# Patient Record
Sex: FEMALE | Race: WHITE | NOT HISPANIC OR LATINO | Employment: OTHER | URBAN - METROPOLITAN AREA
[De-identification: names, ages, dates, MRNs, and addresses within clinical notes are randomized per-mention and may not be internally consistent; named-entity substitution may affect disease eponyms.]

---

## 2019-07-19 ENCOUNTER — HOSPITAL ENCOUNTER (INPATIENT)
Facility: HOSPITAL | Age: 84
LOS: 17 days | Discharge: HOME | DRG: 884 | End: 2019-08-05
Attending: PSYCHIATRY & NEUROLOGY | Admitting: PSYCHIATRY & NEUROLOGY
Payer: MEDICARE

## 2019-07-19 ENCOUNTER — HOSPITAL ENCOUNTER (EMERGENCY)
Facility: HOSPITAL | Age: 84
DRG: 884 | End: 2019-07-19
Attending: EMERGENCY MEDICINE
Payer: MEDICARE

## 2019-07-19 VITALS
SYSTOLIC BLOOD PRESSURE: 127 MMHG | BODY MASS INDEX: 23.56 KG/M2 | HEART RATE: 71 BPM | WEIGHT: 120 LBS | TEMPERATURE: 98.6 F | OXYGEN SATURATION: 100 % | HEIGHT: 60 IN | RESPIRATION RATE: 16 BRPM | DIASTOLIC BLOOD PRESSURE: 60 MMHG

## 2019-07-19 DIAGNOSIS — R44.3 HALLUCINATIONS: Primary | ICD-10-CM

## 2019-07-19 DIAGNOSIS — F06.32 DEPRESSIVE DISORDER DUE TO ANOTHER MEDICAL CONDITION WITH MAJOR DEPRESSIVE-LIKE EPISODE: Primary | ICD-10-CM

## 2019-07-19 DIAGNOSIS — I10 HYPERTENSION: ICD-10-CM

## 2019-07-19 DIAGNOSIS — E78.5 DYSLIPIDEMIA: ICD-10-CM

## 2019-07-19 DIAGNOSIS — E03.9 HYPOTHYROIDISM: ICD-10-CM

## 2019-07-19 DIAGNOSIS — E87.1 HYPONATREMIA: ICD-10-CM

## 2019-07-19 DIAGNOSIS — L30.4 INTERTRIGO: ICD-10-CM

## 2019-07-19 DIAGNOSIS — R42 DIZZINESS: ICD-10-CM

## 2019-07-19 DIAGNOSIS — E55.9 VITAMIN D DEFICIENCY: ICD-10-CM

## 2019-07-19 DIAGNOSIS — Z00.8 MEDICAL CLEARANCE FOR PSYCHIATRIC ADMISSION: Primary | ICD-10-CM

## 2019-07-19 DIAGNOSIS — Z00.8 MEDICAL CLEARANCE FOR PSYCHIATRIC ADMISSION: ICD-10-CM

## 2019-07-19 DIAGNOSIS — K21.9 GERD (GASTROESOPHAGEAL REFLUX DISEASE): ICD-10-CM

## 2019-07-19 DIAGNOSIS — E53.8 VITAMIN B12 DEFICIENCY: ICD-10-CM

## 2019-07-19 DIAGNOSIS — I25.10 CORONARY ARTERY DISEASE: ICD-10-CM

## 2019-07-19 LAB
ALBUMIN SERPL BCP-MCNC: 4 G/DL (ref 3.5–5.7)
ALP SERPL-CCNC: 58 U/L (ref 55–165)
ALT SERPL W P-5'-P-CCNC: 11 U/L (ref 7–52)
AMPHETAMINES SERPL QL SCN: NEGATIVE
ANION GAP SERPL CALCULATED.3IONS-SCNC: 4 MMOL/L (ref 4–13)
AST SERPL W P-5'-P-CCNC: 14 U/L (ref 13–39)
ATRIAL RATE: 62 BPM
BARBITURATES UR QL: NEGATIVE
BASOPHILS # BLD AUTO: 0 THOUSANDS/ΜL (ref 0–0.1)
BASOPHILS NFR BLD AUTO: 1 % (ref 0–2)
BENZODIAZ UR QL: POSITIVE
BILIRUB SERPL-MCNC: 0.4 MG/DL (ref 0.2–1)
BILIRUB UR QL STRIP: NEGATIVE
BUN SERPL-MCNC: 14 MG/DL (ref 7–25)
CALCIUM SERPL-MCNC: 9 MG/DL (ref 8.6–10.5)
CHLORIDE SERPL-SCNC: 94 MMOL/L (ref 98–107)
CLARITY UR: CLEAR
CO2 SERPL-SCNC: 29 MMOL/L (ref 21–31)
COCAINE UR QL: NEGATIVE
COLOR UR: YELLOW
CREAT SERPL-MCNC: 1.02 MG/DL (ref 0.6–1.2)
EOSINOPHIL # BLD AUTO: 0.1 THOUSAND/ΜL (ref 0–0.61)
EOSINOPHIL NFR BLD AUTO: 2 % (ref 0–5)
ERYTHROCYTE [DISTWIDTH] IN BLOOD BY AUTOMATED COUNT: 13.6 % (ref 11.5–14.5)
ETHANOL SERPL-MCNC: <10 MG/DL
GFR SERPL CREATININE-BSD FRML MDRD: 48 ML/MIN/1.73SQ M
GLUCOSE SERPL-MCNC: 102 MG/DL (ref 65–99)
GLUCOSE UR STRIP-MCNC: NEGATIVE MG/DL
HCT VFR BLD AUTO: 34.6 % (ref 42–47)
HGB BLD-MCNC: 11.8 G/DL (ref 12–16)
HGB UR QL STRIP.AUTO: NEGATIVE
KETONES UR STRIP-MCNC: NEGATIVE MG/DL
LEUKOCYTE ESTERASE UR QL STRIP: NEGATIVE
LYMPHOCYTES # BLD AUTO: 0.9 THOUSANDS/ΜL (ref 0.6–4.47)
LYMPHOCYTES NFR BLD AUTO: 15 % (ref 21–51)
MCH RBC QN AUTO: 33.8 PG (ref 26–34)
MCHC RBC AUTO-ENTMCNC: 34.2 G/DL (ref 31–37)
MCV RBC AUTO: 99 FL (ref 81–99)
METHADONE UR QL: NEGATIVE
MONOCYTES # BLD AUTO: 0.5 THOUSAND/ΜL (ref 0.17–1.22)
MONOCYTES NFR BLD AUTO: 8 % (ref 2–12)
NEUTROPHILS # BLD AUTO: 4.4 THOUSANDS/ΜL (ref 1.4–6.5)
NEUTS SEG NFR BLD AUTO: 74 % (ref 42–75)
NITRITE UR QL STRIP: NEGATIVE
OPIATES UR QL SCN: NEGATIVE
P AXIS: 63 DEGREES
PCP UR QL: NEGATIVE
PH UR STRIP.AUTO: 6 [PH]
PLATELET # BLD AUTO: 366 THOUSANDS/UL (ref 149–390)
PMV BLD AUTO: 7.8 FL (ref 8.6–11.7)
POTASSIUM SERPL-SCNC: 4.4 MMOL/L (ref 3.5–5.5)
PR INTERVAL: 172 MS
PROT SERPL-MCNC: 6.9 G/DL (ref 6.4–8.9)
PROT UR STRIP-MCNC: NEGATIVE MG/DL
QRS AXIS: 65 DEGREES
QRSD INTERVAL: 96 MS
QT INTERVAL: 422 MS
QTC INTERVAL: 428 MS
RBC # BLD AUTO: 3.5 MILLION/UL (ref 3.9–5.2)
SODIUM SERPL-SCNC: 127 MMOL/L (ref 134–143)
SP GR UR STRIP.AUTO: 1.01 (ref 1–1.03)
T WAVE AXIS: 62 DEGREES
THC UR QL: NEGATIVE
TSH SERPL DL<=0.05 MIU/L-ACNC: 0.99 UIU/ML (ref 0.45–5.33)
UROBILINOGEN UR QL STRIP.AUTO: 0.2 E.U./DL
VENTRICULAR RATE: 62 BPM
WBC # BLD AUTO: 5.9 THOUSAND/UL (ref 4.8–10.8)

## 2019-07-19 PROCEDURE — 36415 COLL VENOUS BLD VENIPUNCTURE: CPT

## 2019-07-19 PROCEDURE — 93005 ELECTROCARDIOGRAM TRACING: CPT

## 2019-07-19 PROCEDURE — 99285 EMERGENCY DEPT VISIT HI MDM: CPT

## 2019-07-19 PROCEDURE — 80307 DRUG TEST PRSMV CHEM ANLYZR: CPT

## 2019-07-19 PROCEDURE — 80053 COMPREHEN METABOLIC PANEL: CPT

## 2019-07-19 PROCEDURE — 84443 ASSAY THYROID STIM HORMONE: CPT

## 2019-07-19 PROCEDURE — 81003 URINALYSIS AUTO W/O SCOPE: CPT

## 2019-07-19 PROCEDURE — 85025 COMPLETE CBC W/AUTO DIFF WBC: CPT

## 2019-07-19 PROCEDURE — 93010 ELECTROCARDIOGRAM REPORT: CPT | Performed by: INTERNAL MEDICINE

## 2019-07-19 PROCEDURE — 80320 DRUG SCREEN QUANTALCOHOLS: CPT

## 2019-07-19 PROCEDURE — 1124F ACP DISCUSS-NO DSCNMKR DOCD: CPT | Performed by: NURSE PRACTITIONER

## 2019-07-19 RX ORDER — TRAZODONE HYDROCHLORIDE 50 MG/1
50 TABLET ORAL
Status: CANCELLED | OUTPATIENT
Start: 2019-07-19

## 2019-07-19 RX ORDER — ACETAMINOPHEN 325 MG/1
650 TABLET ORAL EVERY 4 HOURS PRN
Status: CANCELLED | OUTPATIENT
Start: 2019-07-19

## 2019-07-19 RX ORDER — ACETAMINOPHEN 325 MG/1
975 TABLET ORAL EVERY 6 HOURS PRN
Status: CANCELLED | OUTPATIENT
Start: 2019-07-19

## 2019-07-19 RX ORDER — ACETAMINOPHEN 325 MG/1
650 TABLET ORAL EVERY 6 HOURS PRN
Status: CANCELLED | OUTPATIENT
Start: 2019-07-19

## 2019-07-19 RX ORDER — LOSARTAN POTASSIUM 25 MG/1
25 TABLET ORAL DAILY
Status: ON HOLD | COMMUNITY
End: 2019-08-03 | Stop reason: SDUPTHER

## 2019-07-19 RX ORDER — HALOPERIDOL 0.5 MG/1
1 TABLET ORAL EVERY 6 HOURS PRN
Status: DISCONTINUED | OUTPATIENT
Start: 2019-07-19 | End: 2019-08-05 | Stop reason: HOSPADM

## 2019-07-19 RX ORDER — OMEPRAZOLE 20 MG/1
20 CAPSULE, DELAYED RELEASE ORAL DAILY
COMMUNITY
End: 2019-08-05 | Stop reason: HOSPADM

## 2019-07-19 RX ORDER — ACETAMINOPHEN 325 MG/1
975 TABLET ORAL EVERY 6 HOURS PRN
Status: DISCONTINUED | OUTPATIENT
Start: 2019-07-19 | End: 2019-08-05 | Stop reason: HOSPADM

## 2019-07-19 RX ORDER — LORAZEPAM 0.5 MG/1
0.5 TABLET ORAL EVERY 6 HOURS PRN
Status: CANCELLED | OUTPATIENT
Start: 2019-07-19

## 2019-07-19 RX ORDER — ZIPRASIDONE MESYLATE 20 MG/ML
10 INJECTION, POWDER, LYOPHILIZED, FOR SOLUTION INTRAMUSCULAR EVERY 4 HOURS PRN
Status: DISCONTINUED | OUTPATIENT
Start: 2019-07-19 | End: 2019-08-05 | Stop reason: HOSPADM

## 2019-07-19 RX ORDER — RISPERIDONE 1 MG/1
1 TABLET, ORALLY DISINTEGRATING ORAL
Status: CANCELLED | OUTPATIENT
Start: 2019-07-19

## 2019-07-19 RX ORDER — LEVOTHYROXINE SODIUM 0.05 MG/1
50 TABLET ORAL
Status: ON HOLD | COMMUNITY
End: 2019-08-03 | Stop reason: SDUPTHER

## 2019-07-19 RX ORDER — MAGNESIUM HYDROXIDE/ALUMINUM HYDROXICE/SIMETHICONE 120; 1200; 1200 MG/30ML; MG/30ML; MG/30ML
30 SUSPENSION ORAL EVERY 4 HOURS PRN
Status: CANCELLED | OUTPATIENT
Start: 2019-07-19

## 2019-07-19 RX ORDER — ACETAMINOPHEN 325 MG/1
650 TABLET ORAL EVERY 6 HOURS PRN
Status: DISCONTINUED | OUTPATIENT
Start: 2019-07-19 | End: 2019-08-05 | Stop reason: HOSPADM

## 2019-07-19 RX ORDER — OLANZAPINE 5 MG/1
5 TABLET ORAL
Status: ON HOLD | COMMUNITY
End: 2019-07-20

## 2019-07-19 RX ORDER — SIMVASTATIN 20 MG
20 TABLET ORAL
Status: ON HOLD | COMMUNITY
End: 2019-08-03 | Stop reason: SDUPTHER

## 2019-07-19 RX ORDER — MEMANTINE HYDROCHLORIDE 5 MG/1
5 TABLET ORAL 2 TIMES DAILY
COMMUNITY
End: 2019-08-05 | Stop reason: HOSPADM

## 2019-07-19 RX ORDER — RISPERIDONE 1 MG/1
1 TABLET, ORALLY DISINTEGRATING ORAL
Status: DISCONTINUED | OUTPATIENT
Start: 2019-07-19 | End: 2019-08-05 | Stop reason: HOSPADM

## 2019-07-19 RX ORDER — SERTRALINE HYDROCHLORIDE 25 MG/1
25 TABLET, FILM COATED ORAL
COMMUNITY
End: 2019-08-05 | Stop reason: HOSPADM

## 2019-07-19 RX ORDER — LORAZEPAM 0.5 MG/1
0.5 TABLET ORAL EVERY 6 HOURS PRN
Status: DISCONTINUED | OUTPATIENT
Start: 2019-07-19 | End: 2019-07-26

## 2019-07-19 RX ORDER — TRAZODONE HYDROCHLORIDE 50 MG/1
50 TABLET ORAL
Status: DISCONTINUED | OUTPATIENT
Start: 2019-07-19 | End: 2019-08-05 | Stop reason: HOSPADM

## 2019-07-19 RX ORDER — HALOPERIDOL 1 MG/1
1 TABLET ORAL EVERY 6 HOURS PRN
Status: CANCELLED | OUTPATIENT
Start: 2019-07-19

## 2019-07-19 RX ORDER — ACETAMINOPHEN 325 MG/1
650 TABLET ORAL EVERY 4 HOURS PRN
Status: DISCONTINUED | OUTPATIENT
Start: 2019-07-19 | End: 2019-08-05 | Stop reason: HOSPADM

## 2019-07-19 RX ORDER — LAMOTRIGINE 25 MG/1
75 TABLET ORAL 2 TIMES DAILY WITH MEALS
COMMUNITY
End: 2019-08-05 | Stop reason: HOSPADM

## 2019-07-19 RX ORDER — ZIPRASIDONE MESYLATE 20 MG/ML
10 INJECTION, POWDER, LYOPHILIZED, FOR SOLUTION INTRAMUSCULAR EVERY 4 HOURS PRN
Status: CANCELLED | OUTPATIENT
Start: 2019-07-19

## 2019-07-19 RX ORDER — ALPRAZOLAM 0.25 MG/1
0.5 TABLET, ORALLY DISINTEGRATING ORAL 3 TIMES DAILY PRN
COMMUNITY
End: 2019-08-05 | Stop reason: HOSPADM

## 2019-07-19 RX ORDER — MECLIZINE HCL 12.5 MG/1
12.5 TABLET ORAL EVERY 8 HOURS PRN
Status: ON HOLD | COMMUNITY
End: 2019-08-03 | Stop reason: SDUPTHER

## 2019-07-19 RX ORDER — MAGNESIUM HYDROXIDE/ALUMINUM HYDROXICE/SIMETHICONE 120; 1200; 1200 MG/30ML; MG/30ML; MG/30ML
30 SUSPENSION ORAL EVERY 4 HOURS PRN
Status: DISCONTINUED | OUTPATIENT
Start: 2019-07-19 | End: 2019-08-05 | Stop reason: HOSPADM

## 2019-07-19 NOTE — ED NOTES
Pt family   Pt took her first dose of olanzapine last night and had seizure today       Wu Quintana, RN  07/19/19 1958

## 2019-07-19 NOTE — ED NOTES
Pt presents due to a change in mental status  Pt is A & O X 4 and was calm and cooperative throughout the assessment  Pt's 3 daughters and grand-daughter were at bedside throughout the assessment  Pt answered all assessment questions w/ appropriate elaboration, family answered historical questions and current med mgt questions  Pt denies any SI/HI or thoughts of harm  Pt is experiencing increased depressin and increased anxiety  Pt is also experiencing AHs that she says are voices that repeat everything she is hearing over and over and they don't stop  Pt also has occasional VH  Pt denies delusions  Pt has O/P services in place and recent med changes may have exacerbated her AHs per her family  Pt has memory issues as well as anxiety issues that she has been being treated for  Pt and her family are in agreement w/ I/P psych admission to address her AHs, anxiety and depression issues  Pt's voluntary paperwork  e g  201, and her rights were explained in detail   Pt signed her 12 and was given a copy of her rights along w/ the 72 hr withdraw from tx explanation

## 2019-07-19 NOTE — ED PROVIDER NOTES
History  Chief Complaint   Patient presents with    Psychiatric Evaluation     Patient is a 70-year-old female who has had episodes of hallucinations recently  Family reports the patient has had both auditory and visual hallucinations  Family reports that the patient talks to people out are not present  Patient denies any complaints  Patient was seen by her primary doctor for these complaints and was placed on medication  By report, patient had an extensive workup as an outpatient including a CT of the head  Family also reports that the patient has a history of absent seizures in the syncope happening more frequently lately  No generalized tonic-clonic activity noted  None       Past Medical History:   Diagnosis Date    Cardiac disease     Seizures ([facility] [address] )        History reviewed  No pertinent surgical history  History reviewed  No pertinent family history  I have reviewed and agree with the history as documented  Social History     Tobacco Use    Smoking status: Never Smoker    Smokeless tobacco: Never Used   Substance Use Topics    Alcohol use: Not Currently    Drug use: Not Currently        Review of Systems   Constitutional: Negative  HENT: Negative  Respiratory: Negative  Cardiovascular: Negative  Gastrointestinal: Negative  Genitourinary: Negative  Musculoskeletal: Negative  Neurological: Positive for seizures  Negative for headaches  Psychiatric/Behavioral: Positive for hallucinations  All other systems reviewed and are negative  Physical Exam  Physical Exam   Constitutional: She is oriented to person, place, and time  She appears well-developed and well-nourished  HENT:   Head: Normocephalic and atraumatic  Mouth/Throat: Oropharynx is clear and moist    Eyes: Conjunctivae and EOM are normal    Neck: Normal range of motion  Neck supple  Cardiovascular: Normal rate and regular rhythm     Pulmonary/Chest: Effort normal and breath sounds normal  Neurological: She is alert and oriented to person, place, and time  Skin: Skin is warm and dry  Psychiatric: She has a normal mood and affect  Her behavior is normal    Nursing note and vitals reviewed  Vital Signs  ED Triage Vitals [07/19/19 1320]   Temperature Pulse Respirations Blood Pressure SpO2   98 6 °F (37 °C) 62 18 133/62 97 %      Temp Source Heart Rate Source Patient Position - Orthostatic VS BP Location FiO2 (%)   Temporal Monitor Lying Left arm --      Pain Score       No Pain           Vitals:    07/19/19 1320 07/19/19 1330   BP: 133/62 133/62   Pulse: 62    Patient Position - Orthostatic VS: Lying          Visual Acuity      ED Medications  Medications - No data to display    Diagnostic Studies  Results Reviewed     Procedure Component Value Units Date/Time    CBC and differential [[id number]] Collected:  07/19/19 1401    Lab Status:  No result Specimen:  Blood from Arm, Left     Comprehensive metabolic panel [[id number]] Collected:  07/19/19 1401    Lab Status:  No result Specimen:  Blood from Arm, Left     TSH [[id number]] Collected:  07/19/19 1401    Lab Status:  No result Specimen:  Blood from Arm, Left     Ethanol [[id number]] Collected:  07/19/19 1401    Lab Status:  No result Specimen:  Blood from Arm, Left     Rapid drug screen, urine [[id number]]     Lab Status:  No result Specimen:  Urine     UA w Reflex to Microscopic w Reflex to Culture [[id number]]     Lab Status:  No result Specimen:  Urine                  No orders to display              Procedures  Procedures       ED Course      patient medically clear for psychiatric evaluation and psychiatric inpatient care                          MDM    Disposition  Final diagnoses:   None     ED Disposition     None      Follow-up Information    None         Patient's Medications    No medications on file     No discharge procedures on file      ED Provider  Electronically Signed by           Josie Benitez MD  07/19/19 2009

## 2019-07-19 NOTE — ED NOTES
Patient is accepted at [facility]  Patient is accepted by Tressa Ribera Chol with Intake  Patient may go to the floor after report is given  Nurse report is to be called to [phone] prior to patient transfer

## 2019-07-19 NOTE — ED NOTES
Insurance Authorization for admission:   No pre-cert necessary, Medicare A&B primary  Phone call placed to [facility])  Phone number: [phone]  This is a supplemental policy that follows w/ Medicare, no COB needed    Phone call placed to [facility] (COB/tertiary)  Phone: Ambrosio Castillo to: Doc Robby asked that UR fax upon D/C    EVS (Eligibility Verification System) called - [phone]  Automated system indicates: Pt is eligible for MA, behavioral health - Managed Care   Rec #[id number]    Insurance Authorization for Transportation:    No transport needed

## 2019-07-19 NOTE — ED NOTES
Patient fed       Monica Menezes RN  07/19/19 [address] [facility],  [id number], RN  07/19/19 5292

## 2019-07-20 PROBLEM — F06.32 DEPRESSIVE DISORDER DUE TO ANOTHER MEDICAL CONDITION WITH MAJOR DEPRESSIVE-LIKE EPISODE: Status: ACTIVE | Noted: 2019-07-20

## 2019-07-20 LAB
ANION GAP SERPL CALCULATED.3IONS-SCNC: 7 MMOL/L (ref 4–13)
BUN SERPL-MCNC: 12 MG/DL (ref 7–25)
CALCIUM SERPL-MCNC: 9.2 MG/DL (ref 8.6–10.5)
CHLORIDE SERPL-SCNC: 96 MMOL/L (ref 98–107)
CO2 SERPL-SCNC: 26 MMOL/L (ref 21–31)
CREAT SERPL-MCNC: 0.94 MG/DL (ref 0.6–1.2)
GFR SERPL CREATININE-BSD FRML MDRD: 53 ML/MIN/1.73SQ M
GLUCOSE P FAST SERPL-MCNC: 100 MG/DL (ref 65–99)
GLUCOSE SERPL-MCNC: 100 MG/DL (ref 65–99)
POTASSIUM SERPL-SCNC: 4.1 MMOL/L (ref 3.5–5.5)
SODIUM SERPL-SCNC: 129 MMOL/L (ref 134–143)

## 2019-07-20 PROCEDURE — 99222 1ST HOSP IP/OBS MODERATE 55: CPT | Performed by: PSYCHIATRY & NEUROLOGY

## 2019-07-20 PROCEDURE — 80048 BASIC METABOLIC PNL TOTAL CA: CPT | Performed by: PSYCHIATRY & NEUROLOGY

## 2019-07-20 RX ORDER — LAMOTRIGINE 25 MG/1
75 TABLET ORAL 2 TIMES DAILY WITH MEALS
Status: DISCONTINUED | OUTPATIENT
Start: 2019-07-20 | End: 2019-08-05 | Stop reason: HOSPADM

## 2019-07-20 RX ORDER — MEMANTINE HYDROCHLORIDE 5 MG/1
5 TABLET ORAL 2 TIMES DAILY
Status: DISCONTINUED | OUTPATIENT
Start: 2019-07-20 | End: 2019-08-05 | Stop reason: HOSPADM

## 2019-07-20 RX ORDER — PANTOPRAZOLE SODIUM 40 MG/1
40 TABLET, DELAYED RELEASE ORAL
Status: DISCONTINUED | OUTPATIENT
Start: 2019-07-21 | End: 2019-08-05 | Stop reason: HOSPADM

## 2019-07-20 RX ORDER — QUETIAPINE FUMARATE 25 MG/1
12.5 TABLET, FILM COATED ORAL
Status: DISCONTINUED | OUTPATIENT
Start: 2019-07-20 | End: 2019-07-22

## 2019-07-20 RX ORDER — LOSARTAN POTASSIUM 50 MG/1
25 TABLET ORAL DAILY
Status: DISCONTINUED | OUTPATIENT
Start: 2019-07-21 | End: 2019-08-05 | Stop reason: HOSPADM

## 2019-07-20 RX ORDER — LEVOTHYROXINE SODIUM 0.05 MG/1
50 TABLET ORAL
Status: DISCONTINUED | OUTPATIENT
Start: 2019-07-21 | End: 2019-08-05 | Stop reason: HOSPADM

## 2019-07-20 RX ADMIN — MEMANTINE 5 MG: 5 TABLET ORAL at 17:45

## 2019-07-20 RX ADMIN — LAMOTRIGINE 75 MG: 25 TABLET ORAL at 17:44

## 2019-07-20 RX ADMIN — QUETIAPINE FUMARATE 12.5 MG: 25 TABLET ORAL at 21:03

## 2019-07-20 NOTE — TREATMENT PLAN
TREATMENT PLAN REVIEW - [facility] [facility] Juancarlos 80 y o  8/18/1927 female MRN: [id number]    [address] [facility]  Room / Bed: Kraig Moser [facility] Encounter: [id number]          Admit Date/Time:  7/19/2019  8:19 PM    Treatment Team: Attending Provider: Tamy Coker MD; Patient Care Assistant: Antonieta Hanks; Certified Nursing Assistant: Aden Gallegos; Certified Nursing Assistant: Mack Avendaño; Certified Nursing Assistant: Delbert Moses; Patient Care Assistant: Marielle Martínez;  Registered Nurse: Diann Jennings RN    Diagnosis: Principal Problem:    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      Patient Strengths/Assets: cooperative, family ties    Patient Barriers/Limitations: impaired cognition    Short Term Goals: decrease in depressive symptoms, decrease in anxiety symptoms, decrease in psychotic symptoms    Long Term Goals: resolution of depressive symptoms, stabilization of mood, resolution of psychotic symptoms    Progress Towards Goals: starting psychiatric medications as prescribed    Recommended Treatment: medication management, patient medication education, group therapy, milieu therapy, continued Behavioral Health psychiatric evaluation/assessment process    Treatment Frequency: daily medication monitoring, group and milieu therapy daily, monitoring through interdisciplinary rounds, monitoring through weekly patient care conferences    Expected Discharge Date:  7/28/19    Discharge Plan: referral for outpatient medication management with a psychiatrist    Treatment Plan Created/Updated By: Ismael Flaherty MD

## 2019-07-20 NOTE — NURSING NOTE
Patient presents from ED  RN picked patient up from ED with Chen Mueller, RN  Daughters at bedside in emergency department  Reports good family support and living with daughter Neo Bacon  States reason for admission is because of the voices she is hearing  States "I just hear everything repeating in my head  Like what I say, but it says it back really fast"  Denies SI/HI  Denies depression or anxiety  Oriented to unit, patient room and call system  Denies questions at this time  Q7 min checks initiated

## 2019-07-20 NOTE — CONSULTS
VTE Prophylaxis: Reason for no pharmacologic prophylaxis Patient is freely mobile around the unit and is therefore at low risk for venous thromboembolism      Recommendations for Discharge:  · Per Primary Service    Counseling / Coordination of Care Time: 45 minutes  Greater than 50% of total time spent on patient counseling and coordination of care  History of Present Illness:  Rodney Zhao is a 80 y o  female who is originally admitted to the psychiatric service due to deterioration in her psychiatric condition  We are consulted for follow-up patient for her medical comorbidities  Review of Systems:  Review of Systems   Constitutional: Negative  HENT: Negative  Eyes: Negative  Respiratory: Negative  Cardiovascular: Negative  Gastrointestinal: Negative  Endocrine: Negative  Genitourinary: Negative  Musculoskeletal: Negative  Allergic/Immunologic: Negative  Neurological: Negative  Hematological: Negative  Past Medical and Surgical History:   Past Medical History:   Diagnosis Date    Anxiety     Cardiac disease     Dementia     Depression     Psychosis ([facility] [address] )     Seizures ([facility] )        No past surgical history on file  Meds/Allergies:  all medications and allergies reviewed    Allergies: Allergies   Allergen Reactions    Zyprexa [Olanzapine] Seizures       Social History:     Marital Status:      Substance Use History:   Social History     Substance and Sexual Activity   Alcohol Use Not Currently     Social History     Tobacco Use   Smoking Status Never Smoker   Smokeless Tobacco Never Used     Social History     Substance and Sexual Activity   Drug Use Not Currently       Family History:  I have reviewed the patients family history    Physical Exam:   Vitals:   Blood Pressure: 142/65 (07/20/19 1528)  Pulse: 80 (07/20/19 1528)  Temperature: 98 4 °F (36 9 °C) (07/20/19 1528)  Temp Source: Temporal (07/20/19 1528)  Respirations: 18 (07/20/19 1528)  Height: 4' 11" (149 9 cm) (07/19/19 2100)  Weight - Scale: 54 kg (119 lb) (07/20/19 0759)  SpO2: 96 % (07/20/19 1528)    Physical Exam   Constitutional: She appears well-developed and well-nourished  Eyes: Pupils are equal, round, and reactive to light  Conjunctivae and EOM are normal    Neck: Normal range of motion  Neck supple  Cardiovascular: Normal rate and regular rhythm  Murmur heard  Pulmonary/Chest: Effort normal and breath sounds normal    Abdominal: Soft  Bowel sounds are normal  She exhibits no distension and no mass  There is no tenderness  Musculoskeletal: Normal range of motion  She exhibits no edema  Neurological: She is alert  Skin: Skin is warm and dry  Capillary refill takes less than 2 seconds  Additional Data:   Lab Results: I have personally reviewed pertinent reports  Results from last 7 days   Lab Units 07/19/19  1401   WBC Thousand/uL 5 90   HEMOGLOBIN g/dL 11 8*   HEMATOCRIT % 34 6*   PLATELETS Thousands/uL 366   NEUTROS PCT % 74   LYMPHS PCT % 15*   MONOS PCT % 8   EOS PCT % 2     Results from last 7 days   Lab Units 07/20/19  0513 07/19/19  1401   POTASSIUM mmol/L 4 1 4 4   CHLORIDE mmol/L 96* 94*   CO2 mmol/L 26 29   BUN mg/dL 12 14   CREATININE mg/dL 0 94 1 02   CALCIUM mg/dL 9 2 9 0   ALK PHOS U/L  --  58   ALT U/L  --  11   AST U/L  --  14             No results found for: HGBA1C        Imaging: I have personally reviewed pertinent reports  No orders to display       EKG, Pathology, and Other Studies Reviewed on Admission:   · EKG:  Pending    ** Please Note: This note has been constructed using a voice recognition system   **    Consult- Rere Bauer 8/18/1927, 80 y o  female MRN: [id number]    Unit/Bed#Kaley Osgood 205-02 Encounter: [id number]    Primary Care Provider: Radha Tomlin MD   Date and time admitted to hospital: 7/19/2019  8:19 PM      Consults    * 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

## 2019-07-20 NOTE — PROGRESS NOTES
Patient  Sleeping at  Present  No  Complaints voiced ,  q  7  Min  Checks  Ongoing  ,  Falling star  protcl  Ongoing

## 2019-07-20 NOTE — PROGRESS NOTES
Patient came in with the following belongings:    Coral dress, coral jacket, multi color sandals, white ring with  multi color stones,  Yellow ring with green stones  Hearing aides in both ears  Glasses, dentures both upper and lower          Contraband:  White under wire bra, and panti hose

## 2019-07-20 NOTE — H&P
Psychiatric Evaluation - 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    Plan:   Risks, benefits and possible side effects of Medications:   Risks, benefits, and possible side effects of medications explained to patient and patient verbalizes understanding  1-Seroquel 12 5mg at bedtime  2-MMSE/MOCA/SLUMS  3-TSH,RPR,L;yme,B12,CT-Head  4-Please avoid BENZOs and Antichilnergics  5-Nueropsych testing  6-Obtain Collateral history  7-Medical consult re-hyponatremia      Chief Complaint: "I don't want to go on living like this"    History of Present Illness     Patient is a 80 y o  female presents with no previous documented psychiatric hx of in-pt-treatment who was admitted with altered mental status  Pt reported to endorsed a worsening in her mood related symptoms as well her anxiety related symptoms  Pt was seen by the bedside ,not a very good historian  She was tearful during the evaluation stating that she needed help and did not want to go on living like this  Pt appeared to be confused with on-going cognitive deficits-short term more profoundly affected  She admitted to a low mood with anhedonia and despair,she however denied suicidal ideation intent or plan  Lot os anxiety related symptoms some agitation and disinhibition  Pt unable to elaborate much more on the psychosis,however as per the family ,they report on going visual and auditory hallucinations in addition to the pt having conversations with people that are not there  Pt is also reported to have endorsed hearing peoples voice and various musical pieces going off in her head repeatedly  There is a premorbid hx of memory issues     Patient was admitted to psychiatric unit on a Voluntary basis        Psychiatric Review Of Systems:  sleep: insomnia  appetite changes: yes  weight changes: yes  energy/anergy: yes  interest/pleasure/anhedonia: anhedenia  somatic symptoms: yes  anxiety/panic: yes  dave: no  guilty/hopeless: yes  self injurious behavior/risky behavior: no    Historical Information     Past Psychiatric History:   None  Currently in treatment with her family physician  Past Suicide attempts: denies  Past Violent behavior: denies  Past Psychiatric medication trial: Zyprexa    Substance Abuse History:  Social History     Tobacco History     Smoking Status  Never Smoker    Smokeless Tobacco Use  Never Used          Alcohol History     Alcohol Use Status  Not Currently          Drug Use     Drug Use Status  Not Currently          Sexual Activity     Sexually Active  Not Currently          Activities of Daily Living    Not Asked               Additional Substance Use Detail     Questions Responses    Alcohol Use Frequency Denies use in past 12 months    Cannabis frequency Never used    Comment: Never used on 7/19/2019     Cocaine frequency Never used    Comment: Never used on 7/19/2019     Inhalant frequency Never used    Comment: Never used on 7/19/2019     Hallucinogen frequency Never used    Comment: Never used on 7/19/2019     Ecstasy frequency Never used    Comment: Never used on 7/19/2019     Other drug frequency Never used    Comment: Never used on 7/19/2019     Last reviewed by Graciela Ferris RN on 7/19/2019        I have assessed this patient for substance use within the past 12 months    Family Psychiatric History:   Pt denies    Social History:  Education: high school diploma/GED  Learning Disabilities: denies  Marital history:   Living arrangement, social support: The patient children  Occupational History: unemployed  Functioning Relationships: good support system    Other Pertinent History: None      Traumatic History:   Abuse: denies  Other Traumatic Events: none    Past Medical History:   Diagnosis Date    Anxiety     Cardiac disease     Dementia     Depression     Psychosis ([facility] [address]     Seizures ([facility] [address] )        Medical Review Of Systems:  Pertinent items are noted in HPI      Meds/Allergies   current meds:   Current Facility-Administered Medications   Medication Dose Route Frequency    acetaminophen (TYLENOL) tablet 650 mg  650 mg Oral Q6H PRN    acetaminophen (TYLENOL) tablet 650 mg  650 mg Oral Q4H PRN    acetaminophen (TYLENOL) tablet 975 mg  975 mg Oral Q6H PRN    aluminum-magnesium hydroxide-simethicone (MYLANTA) 200-200-20 mg/5 mL oral suspension 30 mL  30 mL Oral Q4H PRN    haloperidol (HALDOL) tablet 1 mg  1 mg Oral Q6H PRN    LORazepam (ATIVAN) tablet 0 5 mg  0 5 mg Oral Q6H PRN    magnesium hydroxide (MILK OF MAGNESIA) 400 mg/5 mL oral suspension 30 mL  30 mL Oral Daily PRN    risperiDONE (RisperDAL M-TABS) dispersible tablet 1 mg  1 mg Oral Q3H PRN    traZODone (DESYREL) tablet 50 mg  50 mg Oral HS PRN    ziprasidone (GEODON) IM injection 10 mg  10 mg Intramuscular Q4H PRN     Allergies   Allergen Reactions    Zyprexa [Olanzapine] Seizures       Objective   Vital signs in last 24 hours:  Temp:  [98 3 °F (36 8 °C)-98 6 °F (37 °C)] 98 5 °F (36 9 °C)  HR:  [62-71] 68  Resp:  [16-18] 16  BP: (127-145)/(60-80) 140/64      Intake/Output Summary (Last 24 hours) at 7/20/2019 1105  Last data filed at 7/19/2019 2126  Gross per 24 hour   Intake 0 ml   Output    Net 0 ml       Mental Status Evaluation:  Appearance:  casually dressed   Behavior:  psychomotor retardation   Speech:  soft   Mood:  decreased range and depressed   Affect:  constricted   Language: repeating phrases   Thought Process:  illogical and perserverative   Thought Content:  delusions  obsessive/rumination and somatic   Perceptual Disturbances: Visual hallucinations   Risk Potential: denies   Sensorium:  person   Cognition:   Memory impaired due to medical condiiton   Short term impaired   Consciousness:  awake    Attention: attention span appeared shorter than expected for age   Intellect: within normal limits   Fund of Knowledge: awareness of current events: poor   Insight:  limited   Judgment: limited   Muscle Strength and Tone: wnl   Gait/Station: slow   Motor Activity: no abnormal movements     Lab Results: I have personally reviewed pertinent lab results  Code Status: No Order  Advance Directive and Living Will:      Power of :    POLST:        Patient Strengths/Assets: cooperative, family ties    Patient Barriers/Limitations: impaired cognition    Counseling / Coordination of Care  Total floor / unit time spent today 50 minutes  Greater than 50% of total time was spent with the patient and / or family counseling and / or coordination of care   A description of the counseling / coordination of care:

## 2019-07-20 NOTE — PROGRESS NOTES
Affect is  [place]  Mood  Is  Mildly  Depressed  focused  On   Being  Here  Afraid  She  Will  Be  Kept here  Just wants  To  Go  Back to  Daughters  Home,   Has  No  Recollection  Of  Past  Seizure and  Taking  Medication   That  May  Have  Led  To  The  Same ,  Verbally  Open  controlled  And  Cooperative  ,  No  Hallucination S  Voiced  At  Present stated  She  Loves to  Dance  And  Frequently  Hears  Music  In  Her  Head ,   Follows  simple e and  Consistent  Redirection   ,,  Positive  Feedback  For  Open  Honest  communication   Reality  orientation  Continued  Redirected  As needed

## 2019-07-21 PROCEDURE — 99232 SBSQ HOSP IP/OBS MODERATE 35: CPT | Performed by: PSYCHIATRY & NEUROLOGY

## 2019-07-21 RX ADMIN — LOSARTAN POTASSIUM 25 MG: 50 TABLET, FILM COATED ORAL at 08:51

## 2019-07-21 RX ADMIN — MEMANTINE 5 MG: 5 TABLET ORAL at 08:53

## 2019-07-21 RX ADMIN — LAMOTRIGINE 75 MG: 25 TABLET ORAL at 08:53

## 2019-07-21 RX ADMIN — PANTOPRAZOLE SODIUM 40 MG: 40 TABLET, DELAYED RELEASE ORAL at 06:35

## 2019-07-21 RX ADMIN — LAMOTRIGINE 75 MG: 25 TABLET ORAL at 18:19

## 2019-07-21 RX ADMIN — MEMANTINE 5 MG: 5 TABLET ORAL at 18:19

## 2019-07-21 RX ADMIN — LORAZEPAM 0.5 MG: 0.5 TABLET ORAL at 14:11

## 2019-07-21 RX ADMIN — LEVOTHYROXINE SODIUM 50 MCG: 50 TABLET ORAL at 06:35

## 2019-07-21 RX ADMIN — QUETIAPINE FUMARATE 12.5 MG: 25 TABLET ORAL at 21:34

## 2019-07-21 RX ADMIN — TRAZODONE HYDROCHLORIDE 50 MG: 50 TABLET ORAL at 22:23

## 2019-07-21 NOTE — PROGRESS NOTES
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      Behavior over the last 24 hours:  unchanged  Sleep: insomnia  Appetite: poor  Medication side effects: No  ROS: no complaints    Mental Status Evaluation:  Appearance:  casually dressed   Behavior:  psychomotor retardation   Speech:  soft   Mood:  anxious, decreased range and depressed   Affect:  constricted   Thought Process:  blocked, concrete, disorganized and illogical   Thought Content:  delusions  persecutory   Perceptual Disturbances: denies   Risk Potential: Pt denies   Sensorium:  person   Cognition:  impaired due to medical condition   Consciousness:  awake    Attention: attention span appeared shorter than expected for age   Insight:  limited   Judgment: limited   Gait/Station: slow   Motor Activity: no abnormal movements     Progress Toward Goals: Pt cont to present with cognitive deficits and fluctuation in her level of alertness  Mood remains low with anhedonia and despair and she struggled with sleep last night    Recommended Treatment:  1-Medical Consult re-hyponatremia  2-Increase Seroquel 25mg at bedtime  3-Please avoid BENZOS and anticholinergics  4-Continue with group therapy, milieu therapy and occupational therapy  Risks, benefits and possible side effects of Medications:   Patient does not verbalize understanding at this time and will require further explanation        Medications:   current meds:   Current Facility-Administered Medications   Medication Dose Route Frequency    acetaminophen (TYLENOL) tablet 650 mg  650 mg Oral Q6H PRN    acetaminophen (TYLENOL) tablet 650 mg  650 mg Oral Q4H PRN    acetaminophen (TYLENOL) tablet 975 mg  975 mg Oral Q6H PRN    aluminum-magnesium hydroxide-simethicone (MYLANTA) 200-200-20 mg/5 mL oral suspension 30 mL  30 mL Oral Q4H PRN    haloperidol (HALDOL) tablet 1 mg  1 mg Oral Q6H PRN    lamoTRIgine (LaMICtal) tablet 75 mg  75 mg Oral BID With Meals    levothyroxine tablet 50 mcg  50 mcg Oral Early Morning    LORazepam (ATIVAN) tablet 0 5 mg  0 5 mg Oral Q6H PRN    losartan (COZAAR) tablet 25 mg  25 mg Oral Daily    magnesium hydroxide (MILK OF MAGNESIA) 400 mg/5 mL oral suspension 30 mL  30 mL Oral Daily PRN    memantine (NAMENDA) tablet 5 mg  5 mg Oral BID    pantoprazole (PROTONIX) EC tablet 40 mg  40 mg Oral Early Morning    QUEtiapine (SEROquel) tablet 12 5 mg  12 5 mg Oral HS    risperiDONE (RisperDAL M-TABS) dispersible tablet 1 mg  1 mg Oral Q3H PRN    traZODone (DESYREL) tablet 50 mg  50 mg Oral HS PRN    ziprasidone (GEODON) IM injection 10 mg  10 mg Intramuscular Q4H PRN     Labs: I have personally reviewed pt's labs    Counseling / Coordination of Care  Total floor / unit time spent today 25 minutes  Greater than 50% of total time was spent with the patient and / or family counseling and / or coordination of care   A description of the counseling / coordination of care:

## 2019-07-21 NOTE — PROGRESS NOTES
Patient withdrawn to room earlier in the day then wandered medrano late afternoon  Patient states when she is talking with someone or or watches TV, the words are repeated in her head over and over which is very upsetting and frustration for patient  Patient's affect flat and tearful; mood depressed  Patient questioning when she can go home  Reality oriented to situation  Patient reflected understand at time  Patient steady on feet  Patient compliant with medications ordered and meals today  Q 7 min checks maintained  Monitoring continues

## 2019-07-21 NOTE — PLAN OF CARE
Problem: Alteration in Thoughts and Perception  Goal: Verbalize thoughts and feelings  Description  Interventions:  - Promote a nonjudgmental and trusting relationship with the patient through active listening and therapeutic communication  - Assess patient's level of functioning, behavior and potential for risk  - Engage patient in 1 on 1 interactions for a minimum of 15 minutes each session  - Encourage patient to express fears, feelings, frustrations, and discuss symptoms    - [place] patient to reality, help patient recognize reality-based thinking   - Administer medications as ordered and assess for potential side effects  - Provide the patient education related to the signs and symptoms of the illness and desired effects of prescribed medications  Outcome: Progressing  Goal: Refrain from acting on delusional thinking/internal stimuli  Description  Interventions:  - Monitor patient closely, per order   - Utilize least restrictive measures   - Set reasonable limits, give positive feedback for acceptable   - Administer medications as ordered and monitor of potential side effects  Outcome: Progressing  Goal: Agree to be compliant with medication regime, as prescribed and report medication side effects  Description  Interventions:  - Offer appropriate PRN medication and supervise ingestion; conduct aims, as needed   Outcome: Progressing  Goal: Attend and participate in unit activities, including therapeutic, recreational, and educational groups  Description  Interventions:  - Provide therapeutic and educational activities daily, encourage attendance and participation, and document same in the medical record   Outcome: Progressing  Goal: Recognize dysfunctional thoughts, communicate reality-based thoughts at the time of discharge  Description  Interventions:  - Provide medication and psycho-education to assist patient in compliance and developing insight into his/her illness   Outcome: Progressing  Goal: Complete daily ADLs, including personal hygiene independently, as able  Description  Interventions:  - Observe, teach, and assist patient with ADLS  - Monitor and promote a balance of rest/activity, with adequate nutrition and elimination   Outcome: Progressing     Problem: Ineffective Coping  Goal: Cooperates with admission process  Description  Interventions:   - Complete admission process  Outcome: Progressing  Goal: Identifies ineffective coping skills  Outcome: Progressing  Goal: Identifies healthy coping skills  Outcome: Progressing  Goal: Demonstrates healthy coping skills  Outcome: Progressing  Goal: Participates in unit activities  Description  Interventions:  - Provide therapeutic environment   - Provide required programming   - Redirect inappropriate behaviors   Outcome: Progressing  Goal: Patient/Family participate in treatment and DC plans  Description  Interventions:  - Provide therapeutic environment  Outcome: Progressing  Goal: Patient/Family verbalizes awareness of resources  Outcome: Progressing  Goal: Understands least restrictive measures  Description  Interventions:  - Utilize least restrictive behavior  Outcome: Progressing  Goal: Free from restraint events  Description  - Utilize least restrictive measures   - Provide behavioral interventions   - Redirect inappropriate behaviors   Outcome: Progressing

## 2019-07-21 NOTE — PROGRESS NOTES
Patient present out in milieu for groups and meals  Affect bright; mood is pleasant  Patient denied voices earlier today saying "I'm cured, I could kiss you " Explained to patient new medication was ordered at bedtime called Seroquel  Although at this time patient complained of hearing voices repeatedly and that it is annoying, anxiety is an 8/10, prn Ativan 0 5mg administered  Patient is visiting with family now  Q 7 minute checks maintained  Monitoring continues

## 2019-07-21 NOTE — PLAN OF CARE
Problem: Alteration in Thoughts and Perception  Goal: Verbalize thoughts and feelings  Description  Interventions:  - Promote a nonjudgmental and trusting relationship with the patient through active listening and therapeutic communication  - Assess patient's level of functioning, behavior and potential for risk  - Engage patient in 1 on 1 interactions for a minimum of 15 minutes each session  - Encourage patient to express fears, feelings, frustrations, and discuss symptoms    - [place] patient to reality, help patient recognize reality-based thinking   - Administer medications as ordered and assess for potential side effects  - Provide the patient education related to the signs and symptoms of the illness and desired effects of prescribed medications  Outcome: Progressing  Goal: Refrain from acting on delusional thinking/internal stimuli  Description  Interventions:  - Monitor patient closely, per order   - Utilize least restrictive measures   - Set reasonable limits, give positive feedback for acceptable   - Administer medications as ordered and monitor of potential side effects  Outcome: Progressing  Goal: Agree to be compliant with medication regime, as prescribed and report medication side effects  Description  Interventions:  - Offer appropriate PRN medication and supervise ingestion; conduct aims, as needed   Outcome: Progressing  Goal: Attend and participate in unit activities, including therapeutic, recreational, and educational groups  Description  Interventions:  - Provide therapeutic and educational activities daily, encourage attendance and participation, and document same in the medical record   Outcome: Progressing  Goal: Recognize dysfunctional thoughts, communicate reality-based thoughts at the time of discharge  Description  Interventions:  - Provide medication and psycho-education to assist patient in compliance and developing insight into his/her illness   Outcome: Progressing  Goal: Complete daily ADLs, including personal hygiene independently, as able  Description  Interventions:  - Observe, teach, and assist patient with ADLS  - Monitor and promote a balance of rest/activity, with adequate nutrition and elimination   Outcome: Progressing     Problem: Alteration in Thoughts and Perception  Goal: Treatment Goal: Gain control of psychotic behaviors/thinking, reduce/eliminate presenting symptoms and demonstrate improved reality functioning upon discharge  Outcome: Not Progressing

## 2019-07-21 NOTE — PROGRESS NOTES
Progress Note - Jez Bauer 80 y o  female MRN: [id number]    Unit/Bed#[facility] 205-02 Encounter: [id number]      Assessment:  The patient was examined today in the day room she is without subjective complaint  Nursing at [facility] staff have no acute issues to report over the last 24 hours the patient was last seen by me  Medications reviewed laboratory studies reviewed  From the point of view of the patient's medical comorbidities she appears to be stable  Plan:  Plan as documented previously    Subjective:   No subjective complaint    Objective:     Vitals: Blood pressure 130/64, pulse 66, temperature 97 8 °F (36 6 °C), temperature source Temporal, resp  rate 18, height 4' 11" (1 499 m), weight 54 kg (119 lb), SpO2 97 %  ,Body mass index is 24 04 kg/m²        Intake/Output Summary (Last 24 hours) at 7/21/2019 1903  Last data filed at 7/21/2019 1725  Gross per 24 hour   Intake 480 ml   Output    Net 480 ml       Physical Exam: /64 (BP Location: Right arm)   Pulse 66   Temp 97 8 °F (36 6 °C) (Temporal)   Resp 18   Ht 4' 11" (1 499 m)   Wt 54 kg (119 lb)   SpO2 97%   BMI 24 04 kg/m²     General Appearance:    Alert, cooperative, no distress, appears stated age   Head:    Normocephalic, without obvious abnormality, atraumatic   Eyes:    PERRL, conjunctiva/corneas clear, EOM's intact, fundi     benign, both eyes   Ears:     Nose:    Throat:    Neck:   Supple, symmetrical, trachea midline, no adenopathy;     thyroid:  no enlargement/tenderness/nodules; no carotid    bruit or JVD   Back:     Symmetric, no curvature, ROM normal, no CVA tenderness   Lungs:     Clear to auscultation bilaterally, respirations unlabored   Chest Wall:    No tenderness or deformity    Heart:    Regular rate and rhythm, S1 and S2 normal, no murmur, rub   or gallop       Abdomen:     Soft, non-tender, bowel sounds active all four quadrants,     no masses, no organomegaly           Extremities:   Extremities normal, atraumatic, no cyanosis or edema   Pulses:   2+ and symmetric all extremities   Skin:   Skin color, texture, turgor normal, no rashes or lesions   Lymph nodes:   Cervical, supraclavicular, and axillary nodes normal            Invasive Devices     None                 Lab, Imaging and other studies: I have personally reviewed pertinent reports

## 2019-07-21 NOTE — PROGRESS NOTES
Patient   Experienced  Early  Morning  Awakening  ,  Woke  Up  Confused  And  frightened  ,  continual  Need   Reality  Orientation  And  reassurance

## 2019-07-22 LAB
ANION GAP SERPL CALCULATED.3IONS-SCNC: 5 MMOL/L (ref 4–13)
BUN SERPL-MCNC: 23 MG/DL (ref 7–25)
CALCIUM SERPL-MCNC: 9.1 MG/DL (ref 8.6–10.5)
CHLORIDE SERPL-SCNC: 96 MMOL/L (ref 98–107)
CO2 SERPL-SCNC: 28 MMOL/L (ref 21–31)
CREAT SERPL-MCNC: 0.98 MG/DL (ref 0.6–1.2)
GFR SERPL CREATININE-BSD FRML MDRD: 51 ML/MIN/1.73SQ M
GLUCOSE P FAST SERPL-MCNC: 101 MG/DL (ref 65–99)
GLUCOSE SERPL-MCNC: 101 MG/DL (ref 65–99)
POTASSIUM SERPL-SCNC: 5 MMOL/L (ref 3.5–5.5)
SODIUM SERPL-SCNC: 129 MMOL/L (ref 134–143)

## 2019-07-22 PROCEDURE — 80048 BASIC METABOLIC PNL TOTAL CA: CPT | Performed by: INTERNAL MEDICINE

## 2019-07-22 RX ORDER — PERPHENAZINE 4 MG/1
4 TABLET, FILM COATED ORAL 2 TIMES DAILY
Status: DISCONTINUED | OUTPATIENT
Start: 2019-07-22 | End: 2019-07-25

## 2019-07-22 RX ORDER — MIRTAZAPINE 15 MG/1
7.5 TABLET, FILM COATED ORAL
Status: DISCONTINUED | OUTPATIENT
Start: 2019-07-22 | End: 2019-08-05 | Stop reason: HOSPADM

## 2019-07-22 RX ADMIN — PERPHENAZINE 4 MG: 4 TABLET, FILM COATED ORAL at 12:59

## 2019-07-22 RX ADMIN — MEMANTINE 5 MG: 5 TABLET ORAL at 09:31

## 2019-07-22 RX ADMIN — MEMANTINE 5 MG: 5 TABLET ORAL at 18:32

## 2019-07-22 RX ADMIN — PERPHENAZINE 4 MG: 4 TABLET, FILM COATED ORAL at 18:32

## 2019-07-22 RX ADMIN — PANTOPRAZOLE SODIUM 40 MG: 40 TABLET, DELAYED RELEASE ORAL at 06:17

## 2019-07-22 RX ADMIN — LEVOTHYROXINE SODIUM 50 MCG: 50 TABLET ORAL at 06:17

## 2019-07-22 RX ADMIN — LAMOTRIGINE 75 MG: 25 TABLET ORAL at 16:03

## 2019-07-22 RX ADMIN — LOSARTAN POTASSIUM 25 MG: 50 TABLET, FILM COATED ORAL at 09:30

## 2019-07-22 RX ADMIN — MIRTAZAPINE 7.5 MG: 15 TABLET, FILM COATED ORAL at 21:23

## 2019-07-22 RX ADMIN — LAMOTRIGINE 75 MG: 25 TABLET ORAL at 07:30

## 2019-07-22 RX ADMIN — ALUMINUM HYDROXIDE, MAGNESIUM HYDROXIDE, AND SIMETHICONE 30 ML: 200; 200; 20 SUSPENSION ORAL at 17:15

## 2019-07-22 RX ADMIN — TRAZODONE HYDROCHLORIDE 50 MG: 50 TABLET ORAL at 21:54
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
## 2019-07-22 NOTE — PROGRESS NOTES
Affect is bland  Able  To  Brighten  Upon  Approach  ,  Remains forgetful  And  Exhibits  Anxiety  As  To  Reasons  For hospitalization  , needs  Ongoing   Support  And  Reality  Orientation  ,  Effects of  med's  Monitored ,    No  Hallucinations  Voiced  At  This   Time , safety  Awareness  Reinforced ,  Allowed  To  Vent  Concerns ,  Positive  Feedback  For treatment compliance

## 2019-07-22 NOTE — PROGRESS NOTES
Progress Note - Tristin Juancarlos 80 y o  female MRN: [id number]  Unit/Bed#: [facility] 205-02 Encounter: [id number]          Subjective:    Pt demonstrates improved cognitive functions, showing fair attention and concentration in the interview  On the other hand, pt shows poor orientation and memory deficit  She also complains of hearing voices that bothers her very much for several months  seroquel helps sleep but the voices  Sertraline helps depression  Pt has been demonstrating low sodium, which was found while pt was admitted  Sleeping well, eating well    Medication side effects:   Hyponatremia which may be related to antidepressant and antipsychotic       Mental Status Evaluation:        Appearance:  casually dressed   Behavior:  Cooperative    Speech:  soft   Mood:  "I am fine"   Affect:  appropriate   Thought Process:  Goal directed   Thought Content:  No delusions now   Perceptual Disturbances: Hearing vocies   Risk Potential: Pt denies   Sensorium:  person   Cognition:  impaired    Consciousness:  awake    Attention: attention span appeared shorter than expected for age   Insight:  improved   Judgment: improved   Gait/Station: Normal    Motor Activity: no abnormal movements              Assessment/Plan   Principal Problem:    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      Recommended Treatment: Continue with group therapy, milieu therapy and occupational therapy  Risks, benefits and possible side effects of Medications:   Risks, benefits, and possible side effects of medications explained to patient and patient verbalizes understanding        Medications:       all current active meds have been reviewed, continue current psychiatric medications and current meds:   Current Facility-Administered Medications   Medication Dose Route Frequency    acetaminophen (TYLENOL) tablet 650 mg  650 mg Oral Q6H PRN    acetaminophen (TYLENOL) tablet 650 mg  650 mg Oral Q4H PRN    acetaminophen (TYLENOL) tablet 975 mg  975 mg Oral Q6H PRN    aluminum-magnesium hydroxide-simethicone (MYLANTA) 200-200-20 mg/5 mL oral suspension 30 mL  30 mL Oral Q4H PRN    haloperidol (HALDOL) tablet 1 mg  1 mg Oral Q6H PRN    lamoTRIgine (LaMICtal) tablet 75 mg  75 mg Oral BID With Meals    levothyroxine tablet 50 mcg  50 mcg Oral Early Morning    LORazepam (ATIVAN) tablet 0 5 mg  0 5 mg Oral Q6H PRN    losartan (COZAAR) tablet 25 mg  25 mg Oral Daily    magnesium hydroxide (MILK OF MAGNESIA) 400 mg/5 mL oral suspension 30 mL  30 mL Oral Daily PRN    memantine (NAMENDA) tablet 5 mg  5 mg Oral BID    mirtazapine (REMERON) tablet 7 5 mg  7 5 mg Oral HS    pantoprazole (PROTONIX) EC tablet 40 mg  40 mg Oral Early Morning    perphenazine tablet 4 mg  4 mg Oral BID    risperiDONE (RisperDAL M-TABS) dispersible tablet 1 mg  1 mg Oral Q3H PRN    traZODone (DESYREL) tablet 50 mg  50 mg Oral HS PRN    ziprasidone (GEODON) IM injection 10 mg  10 mg Intramuscular Q4H PRN     Starting trilafon 4mg po bid      Labs:  Reviewed  Admission on 07/19/2019   Component Date Value    Sodium 07/20/2019 129*    Potassium 07/20/2019 4 1     Chloride 07/20/2019 96*    CO2 07/20/2019 26     ANION GAP 07/20/2019 7     BUN 07/20/2019 12     Creatinine 07/20/2019 0 94     Glucose 07/20/2019 100*    Glucose, Fasting 07/20/2019 100*    Calcium 07/20/2019 9 2     eGFR 07/20/2019 53     Sodium 07/22/2019 129*    Potassium 07/22/2019 5 0     Chloride 07/22/2019 96*    CO2 07/22/2019 28     ANION GAP 07/22/2019 5     BUN 07/22/2019 23     Creatinine 07/22/2019 0 98     Glucose 07/22/2019 101*    Glucose, Fasting 07/22/2019 101*    Calcium 07/22/2019 9 1     eGFR 07/22/2019 51        Counseling / Coordination of Care  Total floor / unit time spent today 20 minutes  Greater than 50% of total time was spent with the patient and / or family counseling and / or coordination of care  A description of the counseling / coordination of care: medications, treatment progress and treatment plan reviewed with patient

## 2019-07-22 NOTE — PROGRESS NOTES
Treatment team meeting - initial    Team members present:   MD Heather Mayer, RN   Phan Rasmussen, LSW   Patient     Treatment plan goals reviewed - pt agreeable and signed; copy on thin chart
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
## 2019-07-22 NOTE — PLAN OF CARE
Problem: Ineffective Coping  Goal: Participates in unit activities  Description  Interventions:  - Provide therapeutic environment   - Provide required programming   - Redirect inappropriate behaviors   Outcome: Progressing   Patient was social and friendly with RT during admission assessment  She joined groups needs RT to repeat questions randomly, due to onset of dementia, she participates appropriately

## 2019-07-22 NOTE — PLAN OF CARE
Problem: Alteration in Thoughts and Perception  Goal: Treatment Goal: Gain control of psychotic behaviors/thinking, reduce/eliminate presenting symptoms and demonstrate improved reality functioning upon discharge  Outcome: Progressing  Goal: Verbalize thoughts and feelings  Description  Interventions:  - Promote a nonjudgmental and trusting relationship with the patient through active listening and therapeutic communication  - Assess patient's level of functioning, behavior and potential for risk  - Engage patient in 1 on 1 interactions for a minimum of 15 minutes each session  - Encourage patient to express fears, feelings, frustrations, and discuss symptoms    - [place] patient to reality, help patient recognize reality-based thinking   - Administer medications as ordered and assess for potential side effects  - Provide the patient education related to the signs and symptoms of the illness and desired effects of prescribed medications  Outcome: Progressing  Goal: Refrain from acting on delusional thinking/internal stimuli  Description  Interventions:  - Monitor patient closely, per order   - Utilize least restrictive measures   - Set reasonable limits, give positive feedback for acceptable   - Administer medications as ordered and monitor of potential side effects  Outcome: Progressing  Goal: Agree to be compliant with medication regime, as prescribed and report medication side effects  Description  Interventions:  - Offer appropriate PRN medication and supervise ingestion; conduct aims, as needed   Outcome: Progressing  Goal: Attend and participate in unit activities, including therapeutic, recreational, and educational groups  Description  Interventions:  - Provide therapeutic and educational activities daily, encourage attendance and participation, and document same in the medical record   Outcome: Progressing  Goal: Recognize dysfunctional thoughts, communicate reality-based thoughts at the time of discharge  Description  Interventions:  - Provide medication and psycho-education to assist patient in compliance and developing insight into his/her illness   Outcome: Progressing  Goal: Complete daily ADLs, including personal hygiene independently, as able  Description  Interventions:  - Observe, teach, and assist patient with ADLS  - Monitor and promote a balance of rest/activity, with adequate nutrition and elimination   Outcome: Progressing     Problem: Ineffective Coping  Goal: Cooperates with admission process  Description  Interventions:   - Complete admission process  Outcome: Progressing  Goal: Identifies ineffective coping skills  Outcome: Progressing  Goal: Identifies healthy coping skills  Outcome: Progressing  Goal: Demonstrates healthy coping skills  Outcome: Progressing  Goal: Participates in unit activities  Description  Interventions:  - Provide therapeutic environment   - Provide required programming   - Redirect inappropriate behaviors   Outcome: Progressing  Goal: Patient/Family participate in treatment and DC plans  Description  Interventions:  - Provide therapeutic environment  Outcome: Progressing  Goal: Patient/Family verbalizes awareness of resources  Outcome: Progressing  Goal: Understands least restrictive measures  Description  Interventions:  - Utilize least restrictive behavior  Outcome: Progressing  Goal: Free from restraint events  Description  - Utilize least restrictive measures   - Provide behavioral interventions   - Redirect inappropriate behaviors   Outcome: Progressing     Problem: DISCHARGE PLANNING - CARE MANAGEMENT  Goal: Discharge to post-acute care or home with appropriate resources  Description  INTERVENTIONS:  - Conduct assessment to determine patient/family and health care team treatment goals, and need for post-acute services based on payer coverage, community resources, and patient preferences, and barriers to discharge  - Address psychosocial, clinical, and financial barriers to discharge as identified in assessment in conjunction with the patient/family and health care team  - Arrange appropriate level of post-acute services according to patients   needs and preference and payer coverage in collaboration with the physician and health care team  - Communicate with and update the patient/family, physician, and health care team regarding progress on the discharge plan  - Arrange appropriate transportation to post-acute venues  Outcome: Progressing

## 2019-07-22 NOTE — SOCIAL WORK
Sw met with patient in small consult room; Pt bright with contact; pt denies SI/HI, endorses AH with repetitive "I hear voices", denies VH, depression and anxiety; pt states "I have been passing out a lot lately" as her reason for admission;  Pt AOx3 with moments of confusion noted during assessment; pt able to provide assessment information without assistance     Pt is 81 yo  female - ; Pt admitted due to altered mental status and AH;  Pt strengths include support system, voluntary admission and stable housing; Pt limitations include limited insight, ineffective coping skills, acute medical concerns; pt coping skills include puzzles, taking walks and spending time with friends/family; no hx MH or AIP; pt denies hx psych meds but currently on seroquel and zoloft; pt denies current and hx SI/SA;  Pt denies access to firearms; pt denies current and hx violence/HI/HA; Pt denies current and hx psychological trauma; pt denies family hx MH, SI/SA, substance abuse and dementia; Pt denies personal substance abuse including tobacco, etoh and drugs; Pt denies current and hx legal issues; Pt  (cannot remember  name); Identifies as heterosexual;  Pt has 4 adult children - lives with Jacobo Conte; pt parents: Faith Joyce and Torito Moralesmonicatiago (both );   Pt has 3 sisters (1 is ); pt graduated h/s - no college; hx ; no  hx; pt wears glasses and hearing aides; pt identifies as [employer]; pt daughter provides transport to appointments; pt unsure if she has POA;  PCP: Dr Roshan Escobar; no current psych provider; pharmacy: [facility]; ok to speak to daughter Jacobo Conte     ROIs signed for daughterJacobo and PCP

## 2019-07-22 NOTE — PROGRESS NOTES
Daily Rounds Documentation:     Team Members present:   Dr Adarsh Chen, MD Dung Brown, MAURICE Fuller, RN  Geovany Boateng, ISRAEL  [place], [place]     New admit; altered mental status at home with [facility]; on unit: prns utilized; confusion; screaming in hallway - accusatory; c/o insomnia - slept following prn trazadone; sodium 129

## 2019-07-22 NOTE — PROGRESS NOTES
desyrel  Given  For sleep  Effective ,  Continues to  Sleep   , q  7  Min  Checks   For safety  Continued ,   Supportive  Milieu  Ongoing

## 2019-07-22 NOTE — H&P
Matty Bauer#  :[id number] F  KSN:[id number]    ZCZ:[id number]  Adm Date: 2019  8:19 PM   ATT PHY: Saurabh Grimaldo, [facility] [facility] [address]       Chief Complaint: altered mental status    History of Presenting Illness: Chaim Colón is a(n) 80y o  year old female  presenting to [address] [place] [address] for altered mental status  Patient noted to have increased depression/anxiety, AH, and VH  Patient has a known history of dementia  We are asked to follow the patient along with reference to her medical co-morbidities  The patient was seen after reviewing the chart and discussing the case with caring staff  Today during our encounter, the patient reported no acute concerns  Labs reviewed  Sodium is stable at 129 on 1200cc fluid restriction  Allergies   Allergen Reactions    Zyprexa [Olanzapine] Seizures       No current facility-administered medications on file prior to encounter        Current Outpatient Medications on File Prior to Encounter   Medication Sig Dispense Refill    ALPRAZolam (NIRAVAM) 0 25 MG dissolvable tablet Take 0 5 mg by mouth 3 (three) times a day as needed for anxiety 2 tabs TID PRN      lamoTRIgine (LaMICtal) 25 mg tablet Take 75 mg by mouth 2 (two) times a day with meals       levothyroxine 50 mcg tablet Take 50 mcg by mouth daily in the early morning       losartan (COZAAR) 25 mg tablet Take 25 mg by mouth daily      meclizine (ANTIVERT) 12 5 MG tablet Take 12 5 mg by mouth every 8 (eight) hours as needed for dizziness       memantine (NAMENDA) 5 mg tablet Take 5 mg by mouth 2 (two) times a day Daily and HS      omeprazole (PriLOSEC) 20 mg delayed release capsule Take 20 mg by mouth daily      rivaroxaban (XARELTO) 15 mg tablet Take 15 mg by mouth every evening      sertraline (ZOLOFT) 25 mg tablet Take 25 mg by mouth 2 (two) times daily after meals       simvastatin (ZOCOR) 20 mg tablet Take 20 mg by mouth daily after dinner          Active Ambulatory Problems     Diagnosis Date Noted    No Active Ambulatory Problems     Resolved Ambulatory Problems     Diagnosis Date Noted    No Resolved Ambulatory Problems     Past Medical History:   Diagnosis Date    Anxiety     Cardiac disease     Dementia     Depression     GERD (gastroesophageal reflux disease)     Hypertension     Hypothyroidism     Psychosis ([facility] )     Seizures ([facility] )        No past surgical history on file  Social History     Socioeconomic History    Marital status:      Spouse name: None    Number of children: None    Years of education: None    Highest education level: None   Occupational History    None   Social Needs    Financial resource strain: None    Food insecurity:     Worry: None     Inability: None    Transportation needs:     Medical: None     Non-medical: None   Tobacco Use    Smoking status: Never Smoker    Smokeless tobacco: Never Used   Substance and Sexual Activity    Alcohol use: Not Currently    Drug use: Not Currently    Sexual activity: Not Currently   Lifestyle    Physical activity:     Days per week: None     Minutes per session: None    Stress: None   Relationships    Social connections:     Talks on phone: None     Gets together: None     Attends Mosque service: None     Active member of club or organization: None     Attends meetings of clubs or organizations: None     Relationship status: None    Intimate partner violence:     Fear of current or ex partner: None     Emotionally abused: None     Physically abused: None     Forced sexual activity: None   Other Topics Concern    None   Social History Narrative    None       Family History: non-contributory  Review of Systems   All other systems reviewed and are negative  Vitals:    07/22/19 0747   BP: 131/60   Pulse: 61   Resp: 18   Temp: 97 9 °F (36 6 °C)   SpO2: 98%       Physical Exam   Constitutional: Awake and Alert   Well-developed and well-nourished  No distress  HENT:   Head: Normocephalic and atraumatic  Mouth/Throat: Oropharynx is clear and moist     Eyes: Conjunctivae and EOM are normal  Pupils are equal, round, and reactive to light  Right and left eye exhibits no discharge  Ears: No discharge, no lesions, slight wax, normal tympanic membranes  Neck: Neck supple  No tracheal deviation present  No thyromegaly present  No lymphadenopathy  Cardiovascular: RRR with normal heart sounds  Exam reveals no friction rub  Early systolic murmur heard most prominently in the aortic region  Pulmonary/Chest: Effort normal and breath sounds normal  No respiratory distress  Patient has no wheezes, rales, or rhonchi  Abdominal: Soft  Bowel sounds are normal  No distension  There is no tenderness  There is no rebound and no guarding  Neurological: Cranial Nerves grossly intact  No sensory deficit  Coordination normal    Skin: Skin is warm and dry  No rash noted  No diaphoresis  No erythema  No edema  No cyanosis  Assessment     Juan Luis Liu is a(n) 80y o  year old female with depression  1  Cardiac with history of Hypertension  Losartan  2  Hypothyroidism  Levothyroxine  3  GERD  Protonix  4  Seizure Disorder  Lamictal   5  Dementia  Namenda  6  DJD/OA  Tylenol as needed  7  Hyponatremia  Continue 1200cc fluid restriction  BMP Wednesday  8  Depression  Defer to psychiatry  Prognosis: Fair  Discharge Plan: In progress  Advanced Directives: I have discussed in detail the patient the advanced directives  The patient states that he has a POA and a living will  First contact is listed as Layo Lui, who can be reached at [phone]  When discussing cardiac and pulmonary resuscitation efforts with the patient, the patient wishes to be a FULL CODE  I have spent more than 50 minutes gathering data, doing physical examination, and discussing the advanced directives, which was witnessed by caring staff      The patient was discussed with Dr Jada Arzola and he is in agreement with the above note

## 2019-07-23 LAB
25(OH)D3 SERPL-MCNC: 18.6 NG/ML (ref 30–100)
VIT B12 SERPL-MCNC: 428 PG/ML (ref 100–900)

## 2019-07-23 PROCEDURE — 82306 VITAMIN D 25 HYDROXY: CPT | Performed by: PHYSICIAN ASSISTANT

## 2019-07-23 PROCEDURE — 82607 VITAMIN B-12: CPT | Performed by: PHYSICIAN ASSISTANT

## 2019-07-23 RX ORDER — SODIUM CHLORIDE 1000 MG
1 TABLET, SOLUBLE MISCELLANEOUS
Status: DISCONTINUED | OUTPATIENT
Start: 2019-07-23 | End: 2019-08-05 | Stop reason: HOSPADM

## 2019-07-23 RX ADMIN — MEMANTINE 5 MG: 5 TABLET ORAL at 17:04

## 2019-07-23 RX ADMIN — LAMOTRIGINE 75 MG: 25 TABLET ORAL at 17:04

## 2019-07-23 RX ADMIN — LAMOTRIGINE 75 MG: 25 TABLET ORAL at 08:22

## 2019-07-23 RX ADMIN — LEVOTHYROXINE SODIUM 50 MCG: 50 TABLET ORAL at 06:00

## 2019-07-23 RX ADMIN — MEMANTINE 5 MG: 5 TABLET ORAL at 08:22

## 2019-07-23 RX ADMIN — PERPHENAZINE 4 MG: 4 TABLET, FILM COATED ORAL at 17:05

## 2019-07-23 RX ADMIN — SODIUM CHLORIDE TAB 1 GM 1 G: 1 TAB at 13:16

## 2019-07-23 RX ADMIN — PANTOPRAZOLE SODIUM 40 MG: 40 TABLET, DELAYED RELEASE ORAL at 06:00

## 2019-07-23 RX ADMIN — PERPHENAZINE 4 MG: 4 TABLET, FILM COATED ORAL at 08:22

## 2019-07-23 RX ADMIN — LOSARTAN POTASSIUM 25 MG: 50 TABLET, FILM COATED ORAL at 08:22

## 2019-07-23 RX ADMIN — MIRTAZAPINE 7.5 MG: 15 TABLET, FILM COATED ORAL at 21:40

## 2019-07-23 RX ADMIN — SODIUM CHLORIDE TAB 1 GM 1 G: 1 TAB at 17:04

## 2019-07-23 NOTE — PLAN OF CARE
Problem: Alteration in Thoughts and Perception  Goal: Verbalize thoughts and feelings  Description  Interventions:  - Promote a nonjudgmental and trusting relationship with the patient through active listening and therapeutic communication  - Assess patient's level of functioning, behavior and potential for risk  - Engage patient in 1 on 1 interactions for a minimum of 15 minutes each session  - Encourage patient to express fears, feelings, frustrations, and discuss symptoms    - [place] patient to reality, help patient recognize reality-based thinking   - Administer medications as ordered and assess for potential side effects  - Provide the patient education related to the signs and symptoms of the illness and desired effects of prescribed medications  Outcome: Progressing  Goal: Refrain from acting on delusional thinking/internal stimuli  Description  Interventions:  - Monitor patient closely, per order   - Utilize least restrictive measures   - Set reasonable limits, give positive feedback for acceptable   - Administer medications as ordered and monitor of potential side effects  Outcome: Progressing  Goal: Agree to be compliant with medication regime, as prescribed and report medication side effects  Description  Interventions:  - Offer appropriate PRN medication and supervise ingestion; conduct aims, as needed   Outcome: Progressing  Goal: Attend and participate in unit activities, including therapeutic, recreational, and educational groups  Description  Interventions:  - Provide therapeutic and educational activities daily, encourage attendance and participation, and document same in the medical record   Outcome: Progressing  Goal: Recognize dysfunctional thoughts, communicate reality-based thoughts at the time of discharge  Description  Interventions:  - Provide medication and psycho-education to assist patient in compliance and developing insight into his/her illness   Outcome: Progressing  Goal: Complete daily ADLs, including personal hygiene independently, as able  Description  Interventions:  - Observe, teach, and assist patient with ADLS  - Monitor and promote a balance of rest/activity, with adequate nutrition and elimination   Outcome: Progressing     Problem: Ineffective Coping  Goal: Cooperates with admission process  Description  Interventions:   - Complete admission process  Outcome: Progressing  Goal: Identifies ineffective coping skills  Outcome: Progressing  Goal: Identifies healthy coping skills  Outcome: Progressing  Goal: Demonstrates healthy coping skills  Outcome: Progressing  Goal: Patient/Family participate in treatment and DC plans  Description  Interventions:  - Provide therapeutic environment  Outcome: Progressing  Goal: Patient/Family verbalizes awareness of resources  Outcome: Progressing  Goal: Understands least restrictive measures  Description  Interventions:  - Utilize least restrictive behavior  Outcome: Progressing  Goal: Free from restraint events  Description  - Utilize least restrictive measures   - Provide behavioral interventions   - Redirect inappropriate behaviors   Outcome: Progressing     Problem: Alteration in Thoughts and Perception  Goal: Treatment Goal: Gain control of psychotic behaviors/thinking, reduce/eliminate presenting symptoms and demonstrate improved reality functioning upon discharge  Outcome: Not Progressing

## 2019-07-23 NOTE — PROGRESS NOTES
Patient was present in the milieu this evening  She attended both group and snack times  She is social with peers  No acute behaviors or outbursts observed  Patient is cooperative with staff  She was medication compliant at HS  She denies anxiety, depression, SI and HI  At time of assessment, she denied hallucinations however as the night progressed she endorses hearing voices singing "Maddy Bloch had a little mayes "  She requested PRN medication for insomnia  Trazodone was administered as per order  Fluid restriction maintained as per order  Will CTM via q7 minute safety checks

## 2019-07-23 NOTE — PLAN OF CARE
Problem: Ineffective Coping  Goal: Participates in unit activities  Description  Interventions:  - Provide therapeutic environment   - Provide required programming   - Redirect inappropriate behaviors   Outcome: Progressing   Patient joining groups she needs things repeated to her due to dementia  She is bright and likes to socialize with staff and peers

## 2019-07-23 NOTE — PROGRESS NOTES
Patient was able to sleep through the night without difficulty, post-administration of PRN Trazodone  No acute events during the overnight hours  No complaints voiced  AM lab work was drawn by staff aide without difficulty  Patient tolerated well  Will CTM via q7 minute safety checks

## 2019-07-23 NOTE — SOCIAL WORK
SW met with patient at pt request;  Pt asked SW if this SW is "mad at me?";  SW inquired why pt would think that - pt replied "because everyone is mad at me for some reason and I wanted to be sure that you weren't"; SW reassured pt that this SW is not mad and that if this SW were mad, pt and SW would discuss it together for resolution - pt agreeable; Pt denies SI/HI/AH/VH, continues to be childlike in nature;  No additional questions or concerns for SW at this time

## 2019-07-23 NOTE — PROGRESS NOTES
Daily Rounds Documentation:     Team Members present:   Dr Allegra Mueller, MD Elba Kumar, MAURICE Gloria, RN  Leonardo Barrera, LSW  Marylou Hirsch, CTRS    Med adjustments yesterday; slept; AH "repetitive voices"

## 2019-07-23 NOTE — PROGRESS NOTES
Patient visible out in milieu; affect bright; mood is good  Patient cooperative and calm  Patient states hearing voicing repeatedly in her head as soon as our conversation begins  Patient is forgetful and redirectable  No behavioral issues noted  Q 7 minute checks maintained  Monitoring continues

## 2019-07-23 NOTE — PROGRESS NOTES
Progress Note - Tristin Juancarlos 80 y o  female MRN: [id number]  Unit/Bed#: [facility] 203-01 Encounter: [id number]          Subjective:    Pt reports that the voices seems not as strong as before  She is able to ask the voices to stop  But the voices are still there and still bothering her  Sleeping well, eating well    Medication side effects:   no report and observation       Mental Status Evaluation:           Appearance:  casually dressed   Behavior:  Cooperative    Speech:  soft   Mood:  "I am fine"   Affect:  appropriate   Thought Process:  Goal directed   Thought Content:  No delusions now   Perceptual Disturbances: Hearing vocies   Risk Potential: Pt denies   Sensorium:  person   Cognition:  impaired    Consciousness:  awake    Attention: attention span appeared normal  expected for age   Insight:  improved   Judgment: improved   Gait/Station: Normal    Motor Activity: no abnormal movements                  Assessment/Plan   Principal Problem:    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      Recommended Treatment: Continue with group therapy, milieu therapy and occupational therapy  Risks, benefits and possible side effects of Medications:   Risks, benefits, and possible side effects of medications explained to patient and patient verbalizes understanding  Medications: At this point, will continue sertraline, as it is relatively one of the least leading to hyponatremia among all antidepressants  Trilafon as a first generation antipsychotic is better than seroquel in low sodium problem             all current active meds have been reviewed, continue current psychiatric medications and current meds:   Current Facility-Administered Medications   Medication Dose Route Frequency    acetaminophen (TYLENOL) tablet 650 mg  650 mg Oral Q6H PRN    acetaminophen (TYLENOL) tablet 650 mg  650 mg Oral Q4H PRN    acetaminophen (TYLENOL) tablet 975 mg  975 mg Oral Q6H PRN    aluminum-magnesium hydroxide-simethicone (MYLANTA) 200-200-20 mg/5 mL oral suspension 30 mL  30 mL Oral Q4H PRN    haloperidol (HALDOL) tablet 1 mg  1 mg Oral Q6H PRN    lamoTRIgine (LaMICtal) tablet 75 mg  75 mg Oral BID With Meals    levothyroxine tablet 50 mcg  50 mcg Oral Early Morning    LORazepam (ATIVAN) tablet 0 5 mg  0 5 mg Oral Q6H PRN    losartan (COZAAR) tablet 25 mg  25 mg Oral Daily    magnesium hydroxide (MILK OF MAGNESIA) 400 mg/5 mL oral suspension 30 mL  30 mL Oral Daily PRN    memantine (NAMENDA) tablet 5 mg  5 mg Oral BID    mirtazapine (REMERON) tablet 7 5 mg  7 5 mg Oral HS    pantoprazole (PROTONIX) EC tablet 40 mg  40 mg Oral Early Morning    perphenazine tablet 4 mg  4 mg Oral BID    risperiDONE (RisperDAL M-TABS) dispersible tablet 1 mg  1 mg Oral Q3H PRN    sodium chloride tablet 1 g  1 g Oral TID With Meals    traZODone (DESYREL) tablet 50 mg  50 mg Oral HS PRN    ziprasidone (GEODON) IM injection 10 mg  10 mg Intramuscular Q4H PRN     Labs:  Reviewed  Admission on 07/19/2019   Component Date Value    Sodium 07/20/2019 129*    Potassium 07/20/2019 4 1     Chloride 07/20/2019 96*    CO2 07/20/2019 26     ANION GAP 07/20/2019 7     BUN 07/20/2019 12     Creatinine 07/20/2019 0 94     Glucose 07/20/2019 100*    Glucose, Fasting 07/20/2019 100*    Calcium 07/20/2019 9 2     eGFR 07/20/2019 53     Sodium 07/22/2019 129*    Potassium 07/22/2019 5 0     Chloride 07/22/2019 96*    CO2 07/22/2019 28     ANION GAP 07/22/2019 5     BUN 07/22/2019 23     Creatinine 07/22/2019 0 98     Glucose 07/22/2019 101*    Glucose, Fasting 07/22/2019 101*    Calcium 07/22/2019 9 1     eGFR 07/22/2019 51     Vitamin B-12 07/23/2019 428        Counseling / Coordination of Care  Total floor / unit time spent today 20 minutes  Greater than 50% of total time was spent with the patient and / or family counseling and / or coordination of care   A description of the counseling / coordination of care: medications, treatment progress and treatment plan reviewed with patient

## 2019-07-23 NOTE — PROGRESS NOTES
Matty Bauer#  :[id number] F  JJF:[id number]    [id number]:[id number]  Adm Date: 2019  8:19 PM   ATT PHY: Saurabh Grimaldo, [address] [facility] [address]         Subjective     The patient was seen after reviewing the chart and discussing the case with caring staff  Today during our encounter, the patient reported no acute medical concerns  Vitamin D and B12 levels are pending  Objective     Vitals:    19 0658   BP: 137/63   Pulse: 72   Resp: 16   Temp: 97 8 °F (36 6 °C)   SpO2: 99%       General Appearance: Awake and Alert  No acute distress  HEENT: Normocephalic, atraumatic  PERRLA, EOMI, MMM  Heart: RRR, Early systolic murmur heard most prominently in the aortic region  Normal S1 and S2   Lungs: CTA bilaterally with fair air entry  Assessment     Chaim Colón is a(n) 80y o  year old female with depression      1  Cardiac with history of Hypertension  Losartan  2  Hypothyroidism  Levothyroxine  3  GERD  Protonix  4  Seizure Disorder  Lamictal   5  Dementia  Namenda  6  DJD/OA  Tylenol as needed  7  Hyponatremia  Continue 1200cc fluid restriction  Will also add salt tablets 1g TID  BMP tomorrow  The patient was discussed with Dr Gennaro Miranda and he is in agreement with the above note

## 2019-07-23 NOTE — SOCIAL WORK
VA[facility] received voicemail from pt daughter, Jay Gloria, requesting SW call her back after 4pm to discuss medication changes and treatment plan;  SW returned phone call [phone] to advise of case management hours and to request a return phone call during day to discuss family concerns;   Awaiting response

## 2019-07-24 LAB
ANION GAP SERPL CALCULATED.3IONS-SCNC: 4 MMOL/L (ref 4–13)
BUN SERPL-MCNC: 20 MG/DL (ref 7–25)
CALCIUM SERPL-MCNC: 9.1 MG/DL (ref 8.6–10.5)
CHLORIDE SERPL-SCNC: 97 MMOL/L (ref 98–107)
CO2 SERPL-SCNC: 28 MMOL/L (ref 21–31)
CREAT SERPL-MCNC: 1.06 MG/DL (ref 0.6–1.2)
GFR SERPL CREATININE-BSD FRML MDRD: 46 ML/MIN/1.73SQ M
GLUCOSE SERPL-MCNC: 93 MG/DL (ref 65–99)
POTASSIUM SERPL-SCNC: 4.8 MMOL/L (ref 3.5–5.5)
SODIUM SERPL-SCNC: 129 MMOL/L (ref 134–143)

## 2019-07-24 PROCEDURE — 80048 BASIC METABOLIC PNL TOTAL CA: CPT | Performed by: PHYSICIAN ASSISTANT

## 2019-07-24 RX ORDER — NYSTATIN 100000 [USP'U]/G
POWDER TOPICAL 2 TIMES DAILY
Status: DISCONTINUED | OUTPATIENT
Start: 2019-07-24 | End: 2019-08-05 | Stop reason: HOSPADM

## 2019-07-24 RX ORDER — MELATONIN
1000 DAILY
Status: DISCONTINUED | OUTPATIENT
Start: 2019-09-11 | End: 2019-08-05 | Stop reason: HOSPADM

## 2019-07-24 RX ORDER — DONEPEZIL HYDROCHLORIDE 5 MG/1
5 TABLET, FILM COATED ORAL
Status: DISCONTINUED | OUTPATIENT
Start: 2019-07-24 | End: 2019-08-05 | Stop reason: HOSPADM

## 2019-07-24 RX ORDER — CYANOCOBALAMIN 1000 UG/ML
1000 INJECTION INTRAMUSCULAR; SUBCUTANEOUS
Status: DISCONTINUED | OUTPATIENT
Start: 2019-07-24 | End: 2019-08-05 | Stop reason: HOSPADM

## 2019-07-24 RX ORDER — ERGOCALCIFEROL 1.25 MG/1
50000 CAPSULE ORAL WEEKLY
Status: DISCONTINUED | OUTPATIENT
Start: 2019-07-24 | End: 2019-08-05 | Stop reason: HOSPADM

## 2019-07-24 RX ADMIN — MEMANTINE 5 MG: 5 TABLET ORAL at 17:18

## 2019-07-24 RX ADMIN — MIRTAZAPINE 7.5 MG: 15 TABLET, FILM COATED ORAL at 21:04

## 2019-07-24 RX ADMIN — PERPHENAZINE 4 MG: 4 TABLET, FILM COATED ORAL at 08:02

## 2019-07-24 RX ADMIN — SODIUM CHLORIDE TAB 1 GM 1 G: 1 TAB at 08:02

## 2019-07-24 RX ADMIN — SODIUM CHLORIDE TAB 1 GM 1 G: 1 TAB at 12:36

## 2019-07-24 RX ADMIN — DONEPEZIL HYDROCHLORIDE 5 MG: 5 TABLET ORAL at 21:04

## 2019-07-24 RX ADMIN — NYSTATIN 1 APPLICATION: 100000 POWDER TOPICAL at 21:05

## 2019-07-24 RX ADMIN — LAMOTRIGINE 75 MG: 25 TABLET ORAL at 17:18

## 2019-07-24 RX ADMIN — MEMANTINE 5 MG: 5 TABLET ORAL at 08:02

## 2019-07-24 RX ADMIN — CYANOCOBALAMIN 1000 MCG: 1000 INJECTION, SOLUTION INTRAMUSCULAR at 17:19

## 2019-07-24 RX ADMIN — PERPHENAZINE 4 MG: 4 TABLET, FILM COATED ORAL at 17:19

## 2019-07-24 RX ADMIN — PANTOPRAZOLE SODIUM 40 MG: 40 TABLET, DELAYED RELEASE ORAL at 05:49

## 2019-07-24 RX ADMIN — ERGOCALCIFEROL 50000 UNITS: 1.25 CAPSULE, LIQUID FILLED ORAL at 17:19

## 2019-07-24 RX ADMIN — LOSARTAN POTASSIUM 25 MG: 50 TABLET, FILM COATED ORAL at 08:02

## 2019-07-24 RX ADMIN — LAMOTRIGINE 75 MG: 25 TABLET ORAL at 08:02

## 2019-07-24 RX ADMIN — SODIUM CHLORIDE TAB 1 GM 1 G: 1 TAB at 17:18

## 2019-07-24 RX ADMIN — LEVOTHYROXINE SODIUM 50 MCG: 50 TABLET ORAL at 05:49

## 2019-07-24 NOTE — PLAN OF CARE
Problem: Ineffective Coping  Goal: Participates in unit activities  Description  Interventions:  - Provide therapeutic environment   - Provide required programming   - Redirect inappropriate behaviors   Outcome: Progressing   Patient joining groups; she is brighter and has been more focused in groups

## 2019-07-24 NOTE — PROGRESS NOTES
No acute events during the overnight hours  Patient was able to sleep through the night without difficulty  No PRN medications needed  Bed is in the lowest position with siderails up x 2   Q7 minute safety checks in progress

## 2019-07-24 NOTE — NURSING NOTE
Patient Belongings Sent to Safe (7/23/19 at 21:00)    Patient Safe Bag# [id number]    Safe Bag Contains the Following:    - (one) Gold colored ring with green colored stones

## 2019-07-24 NOTE — PROGRESS NOTES
Matty Bauer#  :[id number] F  CLH:[id number]    [id number]  Adm Date: 2019  8:19 PM   ATT PHY: Loann Brittle, [address]         Subjective     The patient was seen after reviewing the chart and discussing the case with caring staff  Today during our encounter, the patient reported no acute medical concerns  On review of patient's labs it was found that patient's vitamin B12 levels were low 428 and vitamin-D levels were also low 18 6  It was also up found that patient's sodium levels are have unchanged 129 despite of being on sodium chloride tablets and fluid restriction of 1200 cc in 24 hours  Objective     Vitals:    19 0607   BP: 132/62   Pulse: 62   Resp: 18   Temp: (!) 96 5 °F (35 8 °C)   SpO2: 96%       General Appearance: Awake and Alert  No acute distress  HEENT: Normocephalic, atraumatic  PERRLA, EOMI, MMM  Heart: RRR, Early systolic murmur heard most prominently in the aortic region  Normal S1 and S2   Lungs: CTA bilaterally with fair air entry  Assessment     Rabia Chol is a(n) 80y o  year old female with depression      1  Cardiac with history of Hypertension  Losartan  2  Hypothyroidism  Levothyroxine  3  GERD  Protonix  4  Seizure Disorder  Lamictal   5  Dementia  Namenda  6  DJD/OA  Tylenol as needed  7  Hyponatremia  I will reduce patient's fluid restriction to 1000 cc in 24 hours  Patient is on Sodium Chloride tablets 1g TID  I will repeat BMP on Friday  8  New vitamin-D deficiency  I will put the patient on vitamin-D bolus doses for 8 weeks followed by vitamin D3 1000 units daily  9  New vitamin B12 deficiency  Patient has been put on monthly B12 injections

## 2019-07-24 NOTE — NURSING NOTE
Patient Belongings: dropped off by family during visiting hours on Tuesday 7/23/2019    Patient belongings are listed as follows:    Belongings are with Patient in Room       - blue, green, yellow 3/4 length sleeve shirt  - blue jeans  - dark blue jeans  - blue button shirt  - brown sweat pants  - pink zipper sweat jacket  - pink underwear (one pair)  - purple underwear (one pair)  - green underwear (one pair)   - tan bra   - flower print nightgown

## 2019-07-24 NOTE — PLAN OF CARE
Problem: Alteration in Thoughts and Perception  Goal: Verbalize thoughts and feelings  Description  Interventions:  - Promote a nonjudgmental and trusting relationship with the patient through active listening and therapeutic communication  - Assess patient's level of functioning, behavior and potential for risk  - Engage patient in 1 on 1 interactions for a minimum of 15 minutes each session  - Encourage patient to express fears, feelings, frustrations, and discuss symptoms    - [place] patient to reality, help patient recognize reality-based thinking   - Administer medications as ordered and assess for potential side effects  - Provide the patient education related to the signs and symptoms of the illness and desired effects of prescribed medications  Outcome: Progressing  Goal: Agree to be compliant with medication regime, as prescribed and report medication side effects  Description  Interventions:  - Offer appropriate PRN medication and supervise ingestion; conduct aims, as needed   Outcome: Progressing  Goal: Attend and participate in unit activities, including therapeutic, recreational, and educational groups  Description  Interventions:  - Provide therapeutic and educational activities daily, encourage attendance and participation, and document same in the medical record   Outcome: Progressing  Goal: Recognize dysfunctional thoughts, communicate reality-based thoughts at the time of discharge  Description  Interventions:  - Provide medication and psycho-education to assist patient in compliance and developing insight into his/her illness   Outcome: Progressing  Goal: Complete daily ADLs, including personal hygiene independently, as able  Description  Interventions:  - Observe, teach, and assist patient with ADLS  - Monitor and promote a balance of rest/activity, with adequate nutrition and elimination   Outcome: Progressing     Problem: Ineffective Coping  Goal: Cooperates with admission process  Description  Interventions:   - Complete admission process  Outcome: Progressing  Goal: Identifies ineffective coping skills  Outcome: Progressing  Goal: Identifies healthy coping skills  Outcome: Progressing  Goal: Demonstrates healthy coping skills  Outcome: Progressing  Goal: Patient/Family participate in treatment and DC plans  Description  Interventions:  - Provide therapeutic environment  Outcome: Progressing  Goal: Patient/Family verbalizes awareness of resources  Outcome: Progressing  Goal: Understands least restrictive measures  Description  Interventions:  - Utilize least restrictive behavior  Outcome: Progressing  Goal: Free from restraint events  Description  - Utilize least restrictive measures   - Provide behavioral interventions   - Redirect inappropriate behaviors   Outcome: Progressing     Problem: Alteration in Thoughts and Perception  Goal: Treatment Goal: Gain control of psychotic behaviors/thinking, reduce/eliminate presenting symptoms and demonstrate improved reality functioning upon discharge  Outcome: Not Progressing  Goal: Refrain from acting on delusional thinking/internal stimuli  Description  Interventions:  - Monitor patient closely, per order   - Utilize least restrictive measures   - Set reasonable limits, give positive feedback for acceptable   - Administer medications as ordered and monitor of potential side effects  Outcome: Not Progressing

## 2019-07-24 NOTE — PROGRESS NOTES
Progress Note - Tristin Juancarlos 80 y o  female MRN: [id number]  Unit/Bed#: [facility] 203-01 Encounter: [id number]          Subjective:    Pt reports only hearing voices in the morning and she was able even to ignore it  She is happy now and wishes to be dc'ed  She is happy with current meds  Will  Have pt's family to help collateral information regarding her baseline  Sleeping well, eating well    Medication side effects:   no report and observation       Mental Status Evaluation:             Appearance:  casually dressed   Behavior:  Cooperative    Speech:  soft   Mood:  "I am fine"   Affect:  appropriate   Thought Process:  Goal directed   Thought Content:  No delusions now   Perceptual Disturbances: Hearing vocies, much less   Risk Potential: Pt denies   Sensorium:  person   Cognition:  impaired    Consciousness:  awake    Attention: attention span appeared normal  expected for age   Insight:  improved   Judgment: improved   Gait/Station: Normal    Motor Activity: no abnormal movements                 Assessment/Plan   Principal Problem:    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      Recommended Treatment: Continue with group therapy, milieu therapy and occupational therapy  Risks, benefits and possible side effects of Medications:   Risks, benefits, and possible side effects of medications explained to patient and patient verbalizes understanding        Medications:       all current active meds have been reviewed, continue current psychiatric medications, current meds:   Current Facility-Administered Medications   Medication Dose Route Frequency    acetaminophen (TYLENOL) tablet 650 mg  650 mg Oral Q6H PRN    acetaminophen (TYLENOL) tablet 650 mg  650 mg Oral Q4H PRN    acetaminophen (TYLENOL) tablet 975 mg  975 mg Oral Q6H PRN    aluminum-magnesium hydroxide-simethicone (MYLANTA) 200-200-20 mg/5 mL oral suspension 30 mL  30 mL Oral Q4H PRN    [START ON 9/11/2019] cholecalciferol (VITAMIN D3) tablet 1,000 Units  1,000 Units Oral Daily    cyanocobalamin injection 1,000 mcg  1,000 mcg Intramuscular Q30 Days    donepezil (ARICEPT) tablet 5 mg  5 mg Oral HS    ergocalciferol (VITAMIN D2) capsule 50,000 Units  50,000 Units Oral Weekly    haloperidol (HALDOL) tablet 1 mg  1 mg Oral Q6H PRN    lamoTRIgine (LaMICtal) tablet 75 mg  75 mg Oral BID With Meals    levothyroxine tablet 50 mcg  50 mcg Oral Early Morning    LORazepam (ATIVAN) tablet 0 5 mg  0 5 mg Oral Q6H PRN    losartan (COZAAR) tablet 25 mg  25 mg Oral Daily    magnesium hydroxide (MILK OF MAGNESIA) 400 mg/5 mL oral suspension 30 mL  30 mL Oral Daily PRN    memantine (NAMENDA) tablet 5 mg  5 mg Oral BID    mirtazapine (REMERON) tablet 7 5 mg  7 5 mg Oral HS    pantoprazole (PROTONIX) EC tablet 40 mg  40 mg Oral Early Morning    perphenazine tablet 4 mg  4 mg Oral BID    risperiDONE (RisperDAL M-TABS) dispersible tablet 1 mg  1 mg Oral Q3H PRN    sodium chloride tablet 1 g  1 g Oral TID With Meals    traZODone (DESYREL) tablet 50 mg  50 mg Oral HS PRN    ziprasidone (GEODON) IM injection 10 mg  10 mg Intramuscular Q4H PRN           Labs:  Reviewed  Admission on 07/19/2019   Component Date Value    Sodium 07/20/2019 129*    Potassium 07/20/2019 4 1     Chloride 07/20/2019 96*    CO2 07/20/2019 26     ANION GAP 07/20/2019 7     BUN 07/20/2019 12     Creatinine 07/20/2019 0 94     Glucose 07/20/2019 100*    Glucose, Fasting 07/20/2019 100*    Calcium 07/20/2019 9 2     eGFR 07/20/2019 53     Sodium 07/22/2019 129*    Potassium 07/22/2019 5 0     Chloride 07/22/2019 96*    CO2 07/22/2019 28     ANION GAP 07/22/2019 5     BUN 07/22/2019 23     Creatinine 07/22/2019 0 98     Glucose 07/22/2019 101*    Glucose, Fasting 07/22/2019 101*    Calcium 07/22/2019 9 1     eGFR 07/22/2019 51     Vit D, 25-Hydroxy 07/23/2019 18 6*    Vitamin B-12 07/23/2019 428     Sodium 07/24/2019 129*    Potassium 07/24/2019 4 8     Chloride 07/24/2019 97*    CO2 07/24/2019 28     ANION GAP 07/24/2019 4     BUN 07/24/2019 20     Creatinine 07/24/2019 1 06     Glucose 07/24/2019 93     Calcium 07/24/2019 9 1     eGFR 07/24/2019 46        Counseling / Coordination of Care  Total floor / unit time spent today 20 minutes  Greater than 50% of total time was spent with the patient and / or family counseling and / or coordination of care  A description of the counseling / coordination of care: medications, treatment progress and treatment plan reviewed with patient

## 2019-07-24 NOTE — PROGRESS NOTES
Patient woke up this morning stating she heard voices telling her she hears voices  No hearing aides noted  Patient out for meals and groups  Affect is bright and mood is anxious  No behavioral issue noted today  Patient is compliant with medications  Q 7 minute checks maintained  Monitoring continues

## 2019-07-24 NOTE — PROGRESS NOTES
Daily Rounds Documentation:     Team Members present:   MD Shona Gaitan, MAURICE Moran, LYNETTE Sanabria, ISRAEL Larose, [place], [place]     PT reports auditorial hallucinations, bright, social, fluid restriction 1200, sodium 129, tearful with regard to discussion about hearing voices  Medication adjustment  NO D/C date at this time

## 2019-07-24 NOTE — PROGRESS NOTES
Patient was present in the milieu this evening  She is pleasant; cooperative with staff  She is social with peers  She denies pain  She also denies anxiety, depression, SI and HI but does endorse intermittent AH  Patient was medication compliant at HS  Fluid restriction maintained as per order  Will CTM via q7 minute safety checks

## 2019-07-25 RX ORDER — PERPHENAZINE 4 MG/1
6 TABLET, FILM COATED ORAL 2 TIMES DAILY
Status: DISCONTINUED | OUTPATIENT
Start: 2019-07-25 | End: 2019-07-26

## 2019-07-25 RX ADMIN — PERPHENAZINE 6 MG: 4 TABLET, FILM COATED ORAL at 18:19

## 2019-07-25 RX ADMIN — MEMANTINE 5 MG: 5 TABLET ORAL at 18:19

## 2019-07-25 RX ADMIN — PERPHENAZINE 4 MG: 4 TABLET, FILM COATED ORAL at 08:49

## 2019-07-25 RX ADMIN — PANTOPRAZOLE SODIUM 40 MG: 40 TABLET, DELAYED RELEASE ORAL at 08:10

## 2019-07-25 RX ADMIN — LEVOTHYROXINE SODIUM 50 MCG: 50 TABLET ORAL at 08:10

## 2019-07-25 RX ADMIN — LORAZEPAM 0.5 MG: 0.5 TABLET ORAL at 11:36

## 2019-07-25 RX ADMIN — LAMOTRIGINE 75 MG: 25 TABLET ORAL at 16:13

## 2019-07-25 RX ADMIN — DONEPEZIL HYDROCHLORIDE 5 MG: 5 TABLET ORAL at 21:04

## 2019-07-25 RX ADMIN — LAMOTRIGINE 75 MG: 25 TABLET ORAL at 08:49

## 2019-07-25 RX ADMIN — NYSTATIN: 100000 POWDER TOPICAL at 21:04

## 2019-07-25 RX ADMIN — SODIUM CHLORIDE TAB 1 GM 1 G: 1 TAB at 08:48

## 2019-07-25 RX ADMIN — NYSTATIN: 100000 POWDER TOPICAL at 08:10

## 2019-07-25 RX ADMIN — LOSARTAN POTASSIUM 25 MG: 50 TABLET, FILM COATED ORAL at 08:49

## 2019-07-25 RX ADMIN — MEMANTINE 5 MG: 5 TABLET ORAL at 08:49

## 2019-07-25 RX ADMIN — MIRTAZAPINE 7.5 MG: 15 TABLET, FILM COATED ORAL at 21:04

## 2019-07-25 RX ADMIN — SODIUM CHLORIDE TAB 1 GM 1 G: 1 TAB at 11:36

## 2019-07-25 RX ADMIN — SODIUM CHLORIDE TAB 1 GM 1 G: 1 TAB at 16:13

## 2019-07-25 NOTE — NURSING NOTE
[phone]  Pt found to be sleeping on most of authors rounds  No acute behavioral issues noted  Q 7 min checks maintained  Fall protocol in place

## 2019-07-25 NOTE — NURSING NOTE
Pt isolative to self affect mood labile  thought process illogical; reports decreased AH  No acute behavioral issues noted  reported patient had 1  Episode of loose stool  Will continue to monitor

## 2019-07-25 NOTE — PROGRESS NOTES
Patient up for breakfast and compliant with medications  Patient's affect bright; mood is anxious  Patient states she continues to hear voices and was upset this morning  At this time they are not as bad per patient  Patient denies SI/HI/anxiety or depression upon assessment  Patient complaining of diarrhea x3, unwitnessed at this time  BP rechecked at 144/68 and HR at 78  Q 7 minute checks maintained  Monitoring continues

## 2019-07-25 NOTE — PROGRESS NOTES
Progress Note - Tristin Juancarlos 80 y o  female MRN: [id number]  Unit/Bed#[address] [facility] 203-01 Encounter: [id number]          Subjective:    Pt reports the voices are not as much as before, but when it comes it still bothers  She recognizes that the voices are her own thought echoing and she seems able to stop it when she say so  But anyway it is bothering  Sleeping well, eating well    Medication side effects:   no report and observation       Mental Status Evaluation:           Appearance:  casually dressed   Behavior:  Cooperative    Speech:  soft   Mood:  "I am fine"   Affect:  appropriate   Thought Process:  Goal directed   Thought Content:  No delusions now   Perceptual Disturbances: Hearing vocies, much less   Risk Potential: Pt denies   Sensorium:  person   Cognition:  impaired    Consciousness:  awake    Attention: attention span appeared normal  expected for age   Insight:  improved   Judgment: improved   Gait/Station: Normal    Motor Activity: no abnormal movements                     Assessment/Plan   Principal Problem:    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      Recommended Treatment: Continue with group therapy, milieu therapy and occupational therapy  Risks, benefits and possible side effects of Medications:   Risks, benefits, and possible side effects of medications explained to patient and patient verbalizes understanding        Medications:       all current active meds have been reviewed, continue current psychiatric medications, current meds:   Current Facility-Administered Medications   Medication Dose Route Frequency    acetaminophen (TYLENOL) tablet 650 mg  650 mg Oral Q6H PRN    acetaminophen (TYLENOL) tablet 650 mg  650 mg Oral Q4H PRN    acetaminophen (TYLENOL) tablet 975 mg  975 mg Oral Q6H PRN    aluminum-magnesium hydroxide-simethicone (MYLANTA) 200-200-20 mg/5 mL oral suspension 30 mL  30 mL Oral Q4H PRN  [START ON 9/11/2019] cholecalciferol (VITAMIN D3) tablet 1,000 Units  1,000 Units Oral Daily    cyanocobalamin injection 1,000 mcg  1,000 mcg Intramuscular Q30 Days    donepezil (ARICEPT) tablet 5 mg  5 mg Oral HS    ergocalciferol (VITAMIN D2) capsule 50,000 Units  50,000 Units Oral Weekly    haloperidol (HALDOL) tablet 1 mg  1 mg Oral Q6H PRN    lamoTRIgine (LaMICtal) tablet 75 mg  75 mg Oral BID With Meals    levothyroxine tablet 50 mcg  50 mcg Oral Early Morning    LORazepam (ATIVAN) tablet 0 5 mg  0 5 mg Oral Q6H PRN    losartan (COZAAR) tablet 25 mg  25 mg Oral Daily    magnesium hydroxide (MILK OF MAGNESIA) 400 mg/5 mL oral suspension 30 mL  30 mL Oral Daily PRN    memantine (NAMENDA) tablet 5 mg  5 mg Oral BID    mirtazapine (REMERON) tablet 7 5 mg  7 5 mg Oral HS    nystatin (MYCOSTATIN) powder   Topical BID    pantoprazole (PROTONIX) EC tablet 40 mg  40 mg Oral Early Morning    perphenazine tablet 6 mg  6 mg Oral BID    risperiDONE (RisperDAL M-TABS) dispersible tablet 1 mg  1 mg Oral Q3H PRN    sodium chloride tablet 1 g  1 g Oral TID With Meals    traZODone (DESYREL) tablet 50 mg  50 mg Oral HS PRN    ziprasidone (GEODON) IM injection 10 mg  10 mg Intramuscular Q4H PRN    and planned medication changes:     Increase trilafon to 6mg po bid      Labs:  Reviewed  Admission on 07/19/2019   Component Date Value    Sodium 07/20/2019 129*    Potassium 07/20/2019 4 1     Chloride 07/20/2019 96*    CO2 07/20/2019 26     ANION GAP 07/20/2019 7     BUN 07/20/2019 12     Creatinine 07/20/2019 0 94     Glucose 07/20/2019 100*    Glucose, Fasting 07/20/2019 100*    Calcium 07/20/2019 9 2     eGFR 07/20/2019 53     Sodium 07/22/2019 129*    Potassium 07/22/2019 5 0     Chloride 07/22/2019 96*    CO2 07/22/2019 28     ANION GAP 07/22/2019 5     BUN 07/22/2019 23     Creatinine 07/22/2019 0 98     Glucose 07/22/2019 101*    Glucose, Fasting 07/22/2019 101*    Calcium 07/22/2019 9 1     eGFR 07/22/2019 51     Vit D, 25-Hydroxy 07/23/2019 18 6*    Vitamin B-12 07/23/2019 428     Sodium 07/24/2019 129*    Potassium 07/24/2019 4 8     Chloride 07/24/2019 97*    CO2 07/24/2019 28     ANION GAP 07/24/2019 4     BUN 07/24/2019 20     Creatinine 07/24/2019 1 06     Glucose 07/24/2019 93     Calcium 07/24/2019 9 1     eGFR 07/24/2019 46        Counseling / Coordination of Care  Total floor / unit time spent today 20 minutes  Greater than 50% of total time was spent with the patient and / or family counseling and / or coordination of care  A description of the counseling / coordination of care: medications, treatment progress and treatment plan reviewed with patient

## 2019-07-25 NOTE — PROGRESS NOTES
Patient appears calm  She has been ambulating around the unit  She is breathing normal and does not appear to be anxious at this time  Continue to monitor

## 2019-07-25 NOTE — PROGRESS NOTES
Patient attempted to walk down [address]rano and was feeling dizzy   Staff member assisted her to chair by nurses station  Vitals -b/p -148/97 pulse 80  -oxygen saturation 97%  Patient hyperventilating  Deep breathing stressed  Nurses were able to calm her down and then she was up and ambulating around the unit  Ativan was effective in calming patient  Dizziness has passed since she is breathing normal  Continue to monitor

## 2019-07-25 NOTE — PROGRESS NOTES
Daily Rounds Documentation:     Team Members present:   Dr Colton Villalpando, MD Aden Hernandez, RN  La Nena Lewis, LSW  Dia Coyle, [facility], [facility]   Silas Collins, Pharmacist    [facility] - voices; confused; slept

## 2019-07-25 NOTE — PROGRESS NOTES
Matty Bauer#  :[id number] F  MXU:[id number]    [facility]:[id number]  Adm Date: 2019  8:19 PM   ATT PHY: Robbie Sotelo, [address]         Subjective     The patient was seen after reviewing the chart and discussing the case with caring staff  Today during our encounter, the patient reported no acute medical concerns  On review of patient's labs it was found that patient's vitamin B12 levels were low 428 and vitamin-D levels were also low 18 6  It was also up found that patient's sodium levels are have unchanged 129 despite of being on sodium chloride tablets and fluid restriction of 1200 cc in 24 hours  Patient's blood pressure this morning was found to be 194/82 with heart rate of 70  Patient after entry was having anxiety attack and was feeling dizzy  A repeat blood pressure after receiving anti anxiety medication was found to be 148/97 with heart rate of 80 and O2 sats of 97%  Objective     Vitals:    19 1512   BP: 134/61   Pulse: 77   Resp: 19   Temp: 98 9 °F (37 2 °C)   SpO2: 96%       General Appearance: Awake and Alert  No acute distress  HEENT: Normocephalic, atraumatic  PERRLA, EOMI, MMM  Heart: RRR, Early systolic murmur heard most prominently in the aortic region  Normal S1 and S2   Lungs: CTA bilaterally with fair air entry  Assessment     Khurramesteban Livingston is a(n) 80y o  year old female with depression      1  Cardiac with history of Hypertension  Losartan  2  Hypothyroidism  Levothyroxine  3  GERD  Protonix  4  Seizure Disorder  Lamictal   5  Dementia  Namenda  6  DJD/OA  Tylenol as needed  7  Hyponatremia  I will reduce patient's fluid restriction to 1000 cc in 24 hours  Patient is on Sodium Chloride tablets 1g TID  I will repeat BMP on Friday  8  New vitamin-D deficiency  I will put the patient on vitamin-D bolus doses for 8 weeks followed by vitamin D3 1000 units daily    9  New vitamin B12 deficiency  Patient has been put on monthly B12 injections    Show room

## 2019-07-25 NOTE — PLAN OF CARE
Problem: Alteration in Thoughts and Perception  Goal: Verbalize thoughts and feelings  Description  Interventions:  - Promote a nonjudgmental and trusting relationship with the patient through active listening and therapeutic communication  - Assess patient's level of functioning, behavior and potential for risk  - Engage patient in 1 on 1 interactions for a minimum of 15 minutes each session  - Encourage patient to express fears, feelings, frustrations, and discuss symptoms    - [place] patient to reality, help patient recognize reality-based thinking   - Administer medications as ordered and assess for potential side effects  - Provide the patient education related to the signs and symptoms of the illness and desired effects of prescribed medications  Outcome: Progressing  Goal: Refrain from acting on delusional thinking/internal stimuli  Description  Interventions:  - Monitor patient closely, per order   - Utilize least restrictive measures   - Set reasonable limits, give positive feedback for acceptable   - Administer medications as ordered and monitor of potential side effects  Outcome: Progressing  Goal: Agree to be compliant with medication regime, as prescribed and report medication side effects  Description  Interventions:  - Offer appropriate PRN medication and supervise ingestion; conduct aims, as needed   Outcome: Progressing  Goal: Attend and participate in unit activities, including therapeutic, recreational, and educational groups  Description  Interventions:  - Provide therapeutic and educational activities daily, encourage attendance and participation, and document same in the medical record   Outcome: Progressing  Goal: Recognize dysfunctional thoughts, communicate reality-based thoughts at the time of discharge  Description  Interventions:  - Provide medication and psycho-education to assist patient in compliance and developing insight into his/her illness   Outcome: Progressing  Goal: Complete daily ADLs, including personal hygiene independently, as able  Description  Interventions:  - Observe, teach, and assist patient with ADLS  - Monitor and promote a balance of rest/activity, with adequate nutrition and elimination   Outcome: Progressing     Problem: Alteration in Thoughts and Perception  Goal: Treatment Goal: Gain control of psychotic behaviors/thinking, reduce/eliminate presenting symptoms and demonstrate improved reality functioning upon discharge  Outcome: Not Progressing

## 2019-07-25 NOTE — PROGRESS NOTES
Patient complaining of dizziness  States of high anxiety  Patient b/p -194/82  Dr Zoe Head made aware  Ativan PRN given for high anxiety  Continue to monitor

## 2019-07-26 LAB
ALBUMIN SERPL BCP-MCNC: 3.9 G/DL (ref 3.5–5.7)
ALP SERPL-CCNC: 54 U/L (ref 55–165)
ALT SERPL W P-5'-P-CCNC: 11 U/L (ref 7–52)
ANION GAP SERPL CALCULATED.3IONS-SCNC: 5 MMOL/L (ref 4–13)
AST SERPL W P-5'-P-CCNC: 16 U/L (ref 13–39)
BILIRUB SERPL-MCNC: 0.4 MG/DL (ref 0.2–1)
BUN SERPL-MCNC: 20 MG/DL (ref 7–25)
CALCIUM SERPL-MCNC: 8.9 MG/DL (ref 8.6–10.5)
CHLORIDE SERPL-SCNC: 100 MMOL/L (ref 98–107)
CO2 SERPL-SCNC: 28 MMOL/L (ref 21–31)
CREAT SERPL-MCNC: 0.88 MG/DL (ref 0.6–1.2)
GFR SERPL CREATININE-BSD FRML MDRD: 58 ML/MIN/1.73SQ M
GLUCOSE P FAST SERPL-MCNC: 86 MG/DL (ref 65–99)
GLUCOSE SERPL-MCNC: 86 MG/DL (ref 65–99)
POTASSIUM SERPL-SCNC: 4.5 MMOL/L (ref 3.5–5.5)
PROT SERPL-MCNC: 6.6 G/DL (ref 6.4–8.9)
SODIUM SERPL-SCNC: 133 MMOL/L (ref 134–143)

## 2019-07-26 PROCEDURE — 80053 COMPREHEN METABOLIC PANEL: CPT | Performed by: FAMILY MEDICINE

## 2019-07-26 PROCEDURE — 99232 SBSQ HOSP IP/OBS MODERATE 35: CPT | Performed by: NURSE PRACTITIONER

## 2019-07-26 RX ORDER — HYDROXYZINE HYDROCHLORIDE 25 MG/1
25 TABLET, FILM COATED ORAL EVERY 6 HOURS PRN
Status: DISCONTINUED | OUTPATIENT
Start: 2019-07-26 | End: 2019-08-05 | Stop reason: HOSPADM

## 2019-07-26 RX ORDER — PERPHENAZINE 4 MG/1
4 TABLET, FILM COATED ORAL 2 TIMES DAILY
Status: DISCONTINUED | OUTPATIENT
Start: 2019-07-26 | End: 2019-07-29

## 2019-07-26 RX ADMIN — MEMANTINE 5 MG: 5 TABLET ORAL at 09:36

## 2019-07-26 RX ADMIN — SODIUM CHLORIDE TAB 1 GM 1 G: 1 TAB at 09:39

## 2019-07-26 RX ADMIN — DONEPEZIL HYDROCHLORIDE 5 MG: 5 TABLET ORAL at 21:37

## 2019-07-26 RX ADMIN — LOSARTAN POTASSIUM 25 MG: 50 TABLET, FILM COATED ORAL at 09:36

## 2019-07-26 RX ADMIN — MIRTAZAPINE 7.5 MG: 15 TABLET, FILM COATED ORAL at 21:36

## 2019-07-26 RX ADMIN — TRAZODONE HYDROCHLORIDE 50 MG: 50 TABLET ORAL at 23:21

## 2019-07-26 RX ADMIN — MEMANTINE 5 MG: 5 TABLET ORAL at 17:25

## 2019-07-26 RX ADMIN — SODIUM CHLORIDE TAB 1 GM 1 G: 1 TAB at 16:12

## 2019-07-26 RX ADMIN — LEVOTHYROXINE SODIUM 50 MCG: 50 TABLET ORAL at 06:26

## 2019-07-26 RX ADMIN — PERPHENAZINE 6 MG: 4 TABLET, FILM COATED ORAL at 09:38

## 2019-07-26 RX ADMIN — LAMOTRIGINE 75 MG: 25 TABLET ORAL at 16:12

## 2019-07-26 RX ADMIN — PANTOPRAZOLE SODIUM 40 MG: 40 TABLET, DELAYED RELEASE ORAL at 06:26

## 2019-07-26 RX ADMIN — PERPHENAZINE 4 MG: 4 TABLET, FILM COATED ORAL at 17:25

## 2019-07-26 RX ADMIN — LAMOTRIGINE 75 MG: 25 TABLET ORAL at 09:35

## 2019-07-26 NOTE — DISCHARGE INSTR - OTHER ORDERS
You are being discharged to your home at     Triggers you have identified during your hospitalization that led to your (suicidal ideation, homicidal ideation, distressed mood, etc) ________  Coping skills you have identified during your hospitalization include ____________  If you are unable to deal with your distressed mood alone please contact (provider, family member, friend)__________  If that is not effective and you continue to have (ex: suicidal ideation, homicidal ideation, distressed mood, overwhelmed, in crisis) please contact    *[address] [place] [address]: [phone]  *National Suicide Prevention Lifeline:  9-580.873.7048  *Peer Support Talk Line (Seven days a week, 1:00 PM  9:00 PM) Call: 151.634.1615 or Text: 501.844.6085  *Alcohol Anonymous: 738.383.7914  *[place] [facility] [employer] [facility]: 586.927.4431  [facility]  on [address] [place] [address] ([facility]) [employer]: 896.593.5415/Website: www queta org  *Substance Abuse and [address] [facility]) [employer], which is a confidential, free, 24-hour-a-day, 365-day-a-year, information service for individuals and family members facing mental health and/or substance use disorders  This service provides referrals to local treatment facilities, support groups, and community-based organizations  Callers can also order free publications and other information  Call 2-512.406.2837/Website: www [facility] gov  *[employer] 2-1-1: This is a toll free, confidential, 24-hour-a-day service which connects you to a community  in your area who can help you find services and resources that are available to you locally and provide critical services that can improve and save lives    Call: 211  /Website: https://rubia bower/

## 2019-07-26 NOTE — SOCIAL WORK
SW received voicemail from patient daughter, Caity Goldstein; Dae Comes returned phone call and provided update; pt is calm, "go with the flow" at baseline;  Pt daughter states that she does not feel that patient is at baseline yet; Pt is not a crier or ruminations at baseline; Children provide 24 hour care for patient in the home; If patient discharged Monday, pt son Seymour Headley (home: [phone] cell: [phone]) would be able to  for discharge; If discharged Tuesday, call Arlene to make arrangements;   No additional questions or concerns at this time; call mutually ended

## 2019-07-26 NOTE — PROGRESS NOTES
Progress Note - Tristin Juancarlos 80 y o  female MRN: [id number]  Unit/Bed#[facility] 203-01 Encounter: [id number]    The patient was seen for continuing care and reviewed with treatment team     Behavior over the last 24 hours:  unchanged     Sleep: normal  Appetite: normal  Medication side effects: No  ROS: no complaints    Vitals:    07/26/19 0737   BP: 154/66   Pulse: 66   Resp: 16   Temp: 98 3 °F (36 8 °C)   SpO2: 98%        Labs:  Reviewed  Admission on 07/19/2019   Component Date Value    Sodium 07/20/2019 129*    Potassium 07/20/2019 4 1     Chloride 07/20/2019 96*    CO2 07/20/2019 26     ANION GAP 07/20/2019 7     BUN 07/20/2019 12     Creatinine 07/20/2019 0 94     Glucose 07/20/2019 100*    Glucose, Fasting 07/20/2019 100*    Calcium 07/20/2019 9 2     eGFR 07/20/2019 53     Sodium 07/22/2019 129*    Potassium 07/22/2019 5 0     Chloride 07/22/2019 96*    CO2 07/22/2019 28     ANION GAP 07/22/2019 5     BUN 07/22/2019 23     Creatinine 07/22/2019 0 98     Glucose 07/22/2019 101*    Glucose, Fasting 07/22/2019 101*    Calcium 07/22/2019 9 1     eGFR 07/22/2019 51     Vit D, 25-Hydroxy 07/23/2019 18 6*    Vitamin B-12 07/23/2019 428     Sodium 07/24/2019 129*    Potassium 07/24/2019 4 8     Chloride 07/24/2019 97*    CO2 07/24/2019 28     ANION GAP 07/24/2019 4     BUN 07/24/2019 20     Creatinine 07/24/2019 1 06     Glucose 07/24/2019 93     Calcium 07/24/2019 9 1     eGFR 07/24/2019 46     Sodium 07/26/2019 133*    Potassium 07/26/2019 4 5     Chloride 07/26/2019 100     CO2 07/26/2019 28     ANION GAP 07/26/2019 5     BUN 07/26/2019 20     Creatinine 07/26/2019 0 88     Glucose 07/26/2019 86     Glucose, Fasting 07/26/2019 86     Calcium 07/26/2019 8 9     AST 07/26/2019 16     ALT 07/26/2019 11     Alkaline Phosphatase 07/26/2019 54*    Total Protein 07/26/2019 6 6     Albumin 07/26/2019 3 9     Total Bilirubin 07/26/2019 0 40     eGFR 07/26/2019 58        Mental Status Evaluation:  Appearance:  Marginal/poor hygiene   Behavior:  guarded   Mood:  anxious   Affect: Flat and Tearful   Speech: Normal rate and Normal volume   Thought Process:  Circumstantial and disorganized   Thought Content:  Obsessive ruminations   Perceptual Disturbances: Denies hallucinations and does not appear to be responding to internal stimuli   Risk Potential: No suicidal or homicidal ideation   Attention/Concentration attention span appeared shorter than expected for age   Orientation:   Oriented x 1   Gait/Station: normal gait/station and normal balance   Motor Activity: No abnormal movement noted     Progress Toward Goals: Patient had difficult day yesterday  She was tearful and panic like symptoms, hyperventilating and requiring PRN, somatic complaints  Prolixin decreased due to age/kidney function and will monitor for return of hallucinations - she has not had in several days  Today she is somewhat calmer and less anxiety, no panic  symptoms  Assessment/Plan    Principal Problem:    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      Recommended Treatment: Continue with pharmacotherapy, group therapy, milieu therapy and occupational therapy    The patient will be maintained on the following medications:    Current Facility-Administered Medications:  acetaminophen 650 mg Oral Q6H PRN Franko Tucker MD   acetaminophen 650 mg Oral Q4H PRN Franko Tucker MD   acetaminophen 975 mg Oral Q6H PRN Franko Tucker MD   aluminum-magnesium hydroxide-simethicone 30 mL Oral Q4H PRN Lorra Nissen, MD   [START ON 9/11/2019] cholecalciferol 1,000 Units Oral Daily Delmy Taylor MD   cyanocobalamin 1,000 mcg Intramuscular Q30 Days Delmy Taylor MD   donepezil 5 mg Oral HS Christie Forte MD   ergocalciferol 50,000 Units Oral Weekly Delmy Taylor MD   haloperidol 1 mg Oral Q6H PRN Lorra Nissen, MD   hydrOXYzine HCL 25 mg Oral Q6H PRN Jaren Simon MD   lamoTRIgine 75 mg Oral BID With Meals Hien Carter MD   levothyroxine 50 mcg Oral Early Morning Hien Carter MD   losartan 25 mg Oral Daily Hien Carter MD   magnesium hydroxide 30 mL Oral Daily PRN Richard Sutton MD   memantine 5 mg Oral BID Hien Carter MD   mirtazapine 7 5 mg Oral HS Jaren Simon MD   nystatin  Topical BID Violetta Harris MD   pantoprazole 40 mg Oral Early Morning Hien Carter MD   perphenazine 4 mg Oral BID Jaren Simon MD   risperiDONE 1 mg Oral Q3H PRN Richard Sutton MD   sodium chloride 1 g Oral TID With Meals Sowmya Montejo PA-C   traZODone 50 mg Oral HS PRN Richard Sutton MD   ziprasidone 10 mg Intramuscular Q4H PRN Richard Sutton MD       Risks, benefits and possible side effects of Medications:   Risks, benefits, and possible side effects of medications explained to patient and patient verbalizes understanding

## 2019-07-26 NOTE — DISCHARGE INSTR - APPOINTMENTS
The treatment team recommends ongoing medication management with a psychiatrist upon discharge  A referral has been made on your behalf to [address], [place], [place] Phone: [phone]  Your intake appointment is scheduled for August 13th at 1030am with Dr Ezequiel Moody  Please plan to arrive 15 minutes prior to your scheduled appointment and bring your insurance card(s), photo ID  Your discharge will be faxed to this provider for continuity of care  You identified your primary care physician as MD Corrine Gonzalez are scheduled for follow up on August 5th at 11am with Dr Real Burt  Please plan to arrive 15 minutes prior to your scheduled appointment and bring your insurance card(s), photo ID  Your discharge will be faxed to this provider for continuity of care

## 2019-07-26 NOTE — PROGRESS NOTES
Affect is  [place]  Mood  Is  Depressed , low  Energy    Noted ,   Internally  Preoccupied ,   Retreats  To  Room  Increased  Paranoid  Noted ,  Labile,  Needy  And  Easily  Attention  Seeking  And  Somatic,  , alllowed to  Vent  Concerns ,  Effects of  meds monitored ,  Physical  staus  Monitored , falling  Star  Protocol  Ongoing

## 2019-07-26 NOTE — PROGRESS NOTES
Patient has remained alert, oriented to self, cooperative and pleasant on approach  Mildly confused at times  Reported AH of music as 'fading today'  Denies lethality  Out of room much of the day  Participates at level of ability  Appetite good  Stated that her episodes of dizziness have diminished  Ambulating with fairly steady gait without device assistance  Will continue to support positive gains

## 2019-07-26 NOTE — PLAN OF CARE
Problem: Alteration in Thoughts and Perception  Goal: Verbalize thoughts and feelings  Description  Interventions:  - Promote a nonjudgmental and trusting relationship with the patient through active listening and therapeutic communication  - Assess patient's level of functioning, behavior and potential for risk  - Engage patient in 1 on 1 interactions for a minimum of 15 minutes each session  - Encourage patient to express fears, feelings, frustrations, and discuss symptoms    - [place] patient to reality, help patient recognize reality-based thinking   - Administer medications as ordered and assess for potential side effects  - Provide the patient education related to the signs and symptoms of the illness and desired effects of prescribed medications  Outcome: Progressing     Problem: Alteration in Thoughts and Perception  Goal: Agree to be compliant with medication regime, as prescribed and report medication side effects  Description  Interventions:  - Offer appropriate PRN medication and supervise ingestion; conduct aims, as needed   Outcome: Progressing

## 2019-07-26 NOTE — PROGRESS NOTES
Daily Rounds Documentation:     Team Members present:   MD Andres Perez, MAURICE  [facility], RN   Howard Pittman, ISRAEL Hung, LIZANDROS     Rough morning yesterday - panic attack; prns utilized; hallucinations related to [facility]; confused at times during day; slept last night

## 2019-07-27 PROCEDURE — 99231 SBSQ HOSP IP/OBS SF/LOW 25: CPT | Performed by: PHYSICIAN ASSISTANT

## 2019-07-27 RX ADMIN — LOSARTAN POTASSIUM 25 MG: 50 TABLET, FILM COATED ORAL at 08:51

## 2019-07-27 RX ADMIN — LEVOTHYROXINE SODIUM 50 MCG: 50 TABLET ORAL at 06:12

## 2019-07-27 RX ADMIN — PANTOPRAZOLE SODIUM 40 MG: 40 TABLET, DELAYED RELEASE ORAL at 06:12

## 2019-07-27 RX ADMIN — DONEPEZIL HYDROCHLORIDE 5 MG: 5 TABLET ORAL at 21:27

## 2019-07-27 RX ADMIN — LAMOTRIGINE 75 MG: 25 TABLET ORAL at 16:11

## 2019-07-27 RX ADMIN — SODIUM CHLORIDE TAB 1 GM 1 G: 1 TAB at 16:11

## 2019-07-27 RX ADMIN — NYSTATIN: 100000 POWDER TOPICAL at 08:51

## 2019-07-27 RX ADMIN — MEMANTINE 5 MG: 5 TABLET ORAL at 17:55

## 2019-07-27 RX ADMIN — MIRTAZAPINE 7.5 MG: 15 TABLET, FILM COATED ORAL at 21:27

## 2019-07-27 RX ADMIN — LAMOTRIGINE 75 MG: 25 TABLET ORAL at 08:51

## 2019-07-27 RX ADMIN — NYSTATIN: 100000 POWDER TOPICAL at 21:30

## 2019-07-27 RX ADMIN — PERPHENAZINE 4 MG: 4 TABLET, FILM COATED ORAL at 17:55

## 2019-07-27 RX ADMIN — MEMANTINE 5 MG: 5 TABLET ORAL at 08:51

## 2019-07-27 RX ADMIN — SODIUM CHLORIDE TAB 1 GM 1 G: 1 TAB at 12:20

## 2019-07-27 RX ADMIN — SODIUM CHLORIDE TAB 1 GM 1 G: 1 TAB at 08:50

## 2019-07-27 RX ADMIN — PERPHENAZINE 4 MG: 4 TABLET, FILM COATED ORAL at 08:51

## 2019-07-27 NOTE — PROGRESS NOTES
Matty Bauer#  :1927 F  KOB:[id number]    GJO:[id number]  Adm Date: 2019  8:19 PM   ATT PHY: Tamy Coker, [address]         Subjective     The patient was seen after reviewing the chart and discussing the case with caring staff  Today during our encounter, the patient reported no acute medical concerns  On review of patient's labs it was found that patient's sodium levels have come up to 133 from 129 on 2019  Objective     Vitals:    19 0700   BP: 148/67   Pulse: 71   Resp: 16   Temp: 97 8 °F (36 6 °C)   SpO2: 98%       General Appearance: Awake and Alert  No acute distress  HEENT: Normocephalic, atraumatic  PERRLA, EOMI, MMM  Heart: RRR, Early systolic murmur heard most prominently in the aortic region  Normal S1 and S2   Lungs: CTA bilaterally with fair air entry  Assessment     [place] Race is a(n) 80y o  year old female with depression      1  Cardiac with history of Hypertension  Losartan  2  Hypothyroidism  Levothyroxine  3  GERD  Protonix  4  Seizure Disorder  Lamictal   5  Dementia  Namenda  6  DJD/OA  Tylenol as needed  7  Hyponatremia  I will reduce patient's fluid restriction to 1000 cc in 24 hours  Patient is on Sodium Chloride tablets 1g TID  8  New vitamin-D deficiency  I will put the patient on vitamin-D bolus doses for 8 weeks followed by vitamin D3 1000 units daily  9  New vitamin B12 deficiency  Patient has been put on monthly B12 injections    Show room

## 2019-07-27 NOTE — PROGRESS NOTES
Affect is  [place]  Mood  Is  Somewhat  Depressed   Low  Energy  Noted ,   Internal  Preoccupation  Noted ,   Labile ,   Effects of  med's  Monitored ,  Supportive  Milieu  Continued ,    , positive  Feedback  For  Treatment gains , falling  Star  Protocol ongoing

## 2019-07-27 NOTE — PROGRESS NOTES
Matty Bauer#  :1927 F  HDI:[id number]    ZLO:[id number]  Adm Date: 2019  8:19 PM   ATT PHY: Tesha Mistry, [facility] [address]         [facility]     The patient was seen after reviewing the chart and discussing the case with caring staff  Today during our encounter, the patient reported no acute medical concerns  On review of patient's labs it was found that patient's sodium levels have come up to 133 from 129 on 2019  Objective     Vitals:    19   BP: 158/68   Pulse: 70   Resp: 18   Temp: 97 8 °F (36 6 °C)   SpO2: 97%       General Appearance: Awake and Alert  No acute distress  HEENT: Normocephalic, atraumatic  PERRLA, EOMI, MMM  Heart: RRR, Early systolic murmur heard most prominently in the aortic region  Normal S1 and S2   Lungs: CTA bilaterally with fair air entry  Assessment     Del Adams is a(n) 80y o  year old female with depression      1  Cardiac with history of Hypertension  Losartan  2  Hypothyroidism  Levothyroxine  3  GERD  Protonix  4  Seizure Disorder  Lamictal   5  Dementia  Namenda  6  DJD/OA  Tylenol as needed  7  Hyponatremia  I will reduce patient's fluid restriction to 1000 cc in 24 hours  Patient is on Sodium Chloride tablets 1g TID  8  New vitamin-D deficiency  I will put the patient on vitamin-D bolus doses for 8 weeks followed by vitamin D3 1000 units daily  9  New vitamin B12 deficiency  Patient has been put on monthly B12 injections    Show room

## 2019-07-27 NOTE — PROGRESS NOTES
Patient  Had  Difficulty  Sleeping  Given  desyrel  Po  Prn  Per request  That  Was  Effective ,   q  7  Min  Checks  Continued  For safety

## 2019-07-27 NOTE — PROGRESS NOTES
Progress Note - Branden Stewart 32 [address] 80 y o  female MRN: [id number]   Unit/Bed#: [facility] 203-01 Encounter: [id number]    Behavior over the last 24 hours: limited improvement  Jose A Mane was seen today for continuation of care and case was reviewed with nursing  Today pt appears brighter and is pleasant, calm, cooperative  She denies any depression or anxiety today  Denies SI/HI  She reports "I am happy go los " Does not acknowledge feeling depressed the days before  She did not mention any panic sxs and denied any AH/VH  Sleep: difficulty falling asleep  Appetite: normal  Medication side effects: No   ROS: denies any headache, abdominal pain, constipation, diarrhea or dizziness    Mental Status Evaluation:    Appearance:  age appropriate, casually dressed   Behavior:  pleasant, cooperative, calm, good eye contact   Speech:  normal rate, normal volume, normal pitch   Mood:  improved   Affect:  brighter   Thought Process:  circumstantial   Associations: circumstantial associations   Thought Content:  no overt delusions   Perceptual Disturbances: denies auditory or visual hallucinations when asked, does not appear responding to internal stimuli   Risk Potential: Suicidal ideation - None at present  Homicidal ideation - None at present  Potential for aggression - Not at present   Sensorium:  oriented to person   Memory:  recent memory impaired   Consciousness:  alert and awake   Attention: attention span and concentration appear shorter than expected for age   Insight:  limited   Judgment: partial   Gait/Station: normal gait/station   Motor Activity: no abnormal movements     Vital signs in last 24 hours:    Temp:  [97 7 °F (36 5 °C)-97 8 °F (36 6 °C)] 97 8 °F (36 6 °C)  HR:  [70-77] 71  Resp:  [16-18] 16  BP: (146-158)/(67-75) 148/67    Laboratory results:    I have personally reviewed all pertinent laboratory/tests results    Most Recent Labs:   Lab Results   Component Value Date    WBC 5 90 07/19/2019    RBC 3 50 (L) 07/19/2019    HGB 11 8 (L) 07/19/2019    HCT 34 6 (L) 07/19/2019     07/19/2019    RDW 13 6 07/19/2019    NEUTROABS 4 40 07/19/2019    SODIUM 133 (L) 07/26/2019    K 4 5 07/26/2019     07/26/2019    CO2 28 07/26/2019    BUN 20 07/26/2019    CREATININE 0 88 07/26/2019    GLUC 86 07/26/2019    GLUF 86 07/26/2019    CALCIUM 8 9 07/26/2019    AST 16 07/26/2019    ALT 11 07/26/2019    ALKPHOS 54 (L) 07/26/2019    TP 6 6 07/26/2019    ALB 3 9 07/26/2019    TBILI 0 40 07/26/2019    AIN6WYSCSUMW 0 990 07/19/2019       Progress Toward Goals: minimal progress    Assessment/Plan   Principal Problem:    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    Recommended Treatment:     Planned medication and treatment changes: All current active medications have been reviewed    Encourage group therapy, milieu therapy and occupational therapy  Behavioral Health checks every 7 minutes  Continue all medications as prescribed   D/c disposition ongoing per CM    Current Facility-Administered Medications:  acetaminophen 650 mg Oral Q6H PRN Franko Tucker MD   acetaminophen 650 mg Oral Q4H PRN Franko Tucker MD   acetaminophen 975 mg Oral Q6H PRN Franko Tucker MD   aluminum-magnesium hydroxide-simethicone 30 mL Oral Q4H PRN Bert Melissa MD   [START ON 9/11/2019] cholecalciferol 1,000 Units Oral Daily Vishal Lima MD   cyanocobalamin 1,000 mcg Intramuscular Q30 Days Vishal Lima MD   donepezil 5 mg Oral HS Yaneth Turner MD   ergocalciferol 50,000 Units Oral Weekly Vishal Lima MD   haloperidol 1 mg Oral Q6H PRN Bert Melissa MD   hydrOXYzine HCL 25 mg Oral Q6H PRN Yaneth Turner MD   lamoTRIgine 75 mg Oral BID With Meals Barbara Petersen MD   levothyroxine 50 mcg Oral Early Morning Barbara Petersen MD   losartan 25 mg Oral Daily Barbara Petersen MD   magnesium hydroxide 30 mL Oral Daily PRN Bert Melissa MD   memantine 5 mg Oral BID Vanessa Carlin Rose Rojas MD   mirtazapine 7 5 mg Oral HS Robbie Sotelo MD   nystatin  Topical BID Caitlyn Medina MD   pantoprazole 40 mg Oral Early Morning Stewart Chawla MD   perphenazine 4 mg Oral BID Robbie Sotelo MD   risperiDONE 1 mg Oral Q3H PRN Vipul Ferris MD   sodium chloride 1 g Oral TID With Meals Amaris Restrepo PA-C   traZODone 50 mg Oral HS PRN Vipul Ferris MD   ziprasidone 10 mg Intramuscular Q4H PRN Vipul Ferris MD       Risks / Benefits of Treatment:    Risks, benefits, and possible side effects of medications explained to patient and patient verbalizes understanding and agreement for treatment  Counseling / Coordination of Care: Total floor / unit time spent today 15 minutes  Greater than 50% of total time was spent with the patient and / or family counseling and / or coordination of care  A description of counseling / coordination of care:  Patient's progress discussed with staff in treatment team meeting  Medications, treatment progress and treatment plan reviewed with patient  Supportive counseling provided to the patient

## 2019-07-28 PROCEDURE — 99232 SBSQ HOSP IP/OBS MODERATE 35: CPT | Performed by: PSYCHIATRY & NEUROLOGY

## 2019-07-28 RX ORDER — MECLIZINE HCL 12.5 MG/1
12.5 TABLET ORAL EVERY 8 HOURS PRN
Status: DISCONTINUED | OUTPATIENT
Start: 2019-07-28 | End: 2019-08-05 | Stop reason: HOSPADM

## 2019-07-28 RX ADMIN — PERPHENAZINE 4 MG: 4 TABLET, FILM COATED ORAL at 08:38

## 2019-07-28 RX ADMIN — MEMANTINE 5 MG: 5 TABLET ORAL at 17:15

## 2019-07-28 RX ADMIN — SODIUM CHLORIDE TAB 1 GM 1 G: 1 TAB at 17:15

## 2019-07-28 RX ADMIN — PANTOPRAZOLE SODIUM 40 MG: 40 TABLET, DELAYED RELEASE ORAL at 06:06

## 2019-07-28 RX ADMIN — LOSARTAN POTASSIUM 25 MG: 50 TABLET, FILM COATED ORAL at 08:37

## 2019-07-28 RX ADMIN — LAMOTRIGINE 75 MG: 25 TABLET ORAL at 17:14

## 2019-07-28 RX ADMIN — SODIUM CHLORIDE TAB 1 GM 1 G: 1 TAB at 08:36

## 2019-07-28 RX ADMIN — SODIUM CHLORIDE TAB 1 GM 1 G: 1 TAB at 13:07

## 2019-07-28 RX ADMIN — MIRTAZAPINE 7.5 MG: 15 TABLET, FILM COATED ORAL at 21:38

## 2019-07-28 RX ADMIN — LEVOTHYROXINE SODIUM 50 MCG: 50 TABLET ORAL at 06:06

## 2019-07-28 RX ADMIN — LAMOTRIGINE 75 MG: 25 TABLET ORAL at 08:37

## 2019-07-28 RX ADMIN — DONEPEZIL HYDROCHLORIDE 5 MG: 5 TABLET ORAL at 21:38

## 2019-07-28 RX ADMIN — TRAZODONE HYDROCHLORIDE 50 MG: 50 TABLET ORAL at 21:38

## 2019-07-28 RX ADMIN — PERPHENAZINE 4 MG: 4 TABLET, FILM COATED ORAL at 17:15

## 2019-07-28 RX ADMIN — MEMANTINE 5 MG: 5 TABLET ORAL at 08:37

## 2019-07-28 RX ADMIN — NYSTATIN 1 APPLICATION: 100000 POWDER TOPICAL at 08:39

## 2019-07-28 NOTE — PROGRESS NOTES
C/O"I am okay appeared    Report from staff regarding this patient received and record reviewed  prior to seeing this patient   Behavior over the last 24 hours: The staff reported that she has been doing better much brighter affect mood pleasant no issues of medication her side effect, seems like her depression is improving, there was no sundowning or any change in the mental status her mood yesterday as per staff note, and reports  Sleep:  Good  Appetite:  Good  Medication side effects:  Fair  ROS:  Depression is in  Mental Status Evaluation:  Appearance:  Dressed appropraitely   Behavior:  cooperative   Speech:  normal   Mood:  euthymic   Affect:  appropriate     Thought Process:  Goal directed   Thought Content:  normal   Perceptual Disturbances: Denied AV hallucination   Risk Potential: NO JOSE F    Sensorium:  normal   Cognition:  intact   Consciousness:  Alert, OX3   Attention: Fair   Insight:  fair   Judgment: fair   Gait/Station: With in normal range   Motor Activity: With in normal range     Progress Toward Goals: working on current treatment goals, no changes  Made in treatment plan   Recommended Treatment: Continue with group therapy, milieu therapy and occupational therapy  Risks, benefits and possible side effects of Medications:   Risks, benefits, and possible side effects of medications explained to patient and patient verbalizes understanding        Medications:   current meds:   Current Facility-Administered Medications   Medication Dose Route Frequency    acetaminophen (TYLENOL) tablet 650 mg  650 mg Oral Q6H PRN    acetaminophen (TYLENOL) tablet 650 mg  650 mg Oral Q4H PRN    acetaminophen (TYLENOL) tablet 975 mg  975 mg Oral Q6H PRN    aluminum-magnesium hydroxide-simethicone (MYLANTA) 200-200-20 mg/5 mL oral suspension 30 mL  30 mL Oral Q4H PRN    [START ON 9/11/2019] cholecalciferol (VITAMIN D3) tablet 1,000 Units  1,000 Units Oral Daily    cyanocobalamin injection 1,000 mcg 1,000 mcg Intramuscular Q30 Days    donepezil (ARICEPT) tablet 5 mg  5 mg Oral HS    ergocalciferol (VITAMIN D2) capsule 50,000 Units  50,000 Units Oral Weekly    haloperidol (HALDOL) tablet 1 mg  1 mg Oral Q6H PRN    hydrOXYzine HCL (ATARAX) tablet 25 mg  25 mg Oral Q6H PRN    lamoTRIgine (LaMICtal) tablet 75 mg  75 mg Oral BID With Meals    levothyroxine tablet 50 mcg  50 mcg Oral Early Morning    losartan (COZAAR) tablet 25 mg  25 mg Oral Daily    magnesium hydroxide (MILK OF MAGNESIA) 400 mg/5 mL oral suspension 30 mL  30 mL Oral Daily PRN    memantine (NAMENDA) tablet 5 mg  5 mg Oral BID    mirtazapine (REMERON) tablet 7 5 mg  7 5 mg Oral HS    nystatin (MYCOSTATIN) powder   Topical BID    pantoprazole (PROTONIX) EC tablet 40 mg  40 mg Oral Early Morning    perphenazine tablet 4 mg  4 mg Oral BID    risperiDONE (RisperDAL M-TABS) dispersible tablet 1 mg  1 mg Oral Q3H PRN    sodium chloride tablet 1 g  1 g Oral TID With Meals    traZODone (DESYREL) tablet 50 mg  50 mg Oral HS PRN    ziprasidone (GEODON) IM injection 10 mg  10 mg Intramuscular Q4H PRN     Labs: NA    Assessment, Diagnosis  and Plan: continue with current meds and goals, F/U tomorrow with treatment team    Counseling / Coordination of Care  Total floor / unit time spent today20 minutes  minutes  Greater than 50% of total time was spent with the patient and / or family counseling and / or coordination of care  A description of the counseling / coordination of care:      Noelle Whitlock MD

## 2019-07-28 NOTE — PLAN OF CARE
Problem: Alteration in Thoughts and Perception  Goal: Verbalize thoughts and feelings  Description  Interventions:  - Promote a nonjudgmental and trusting relationship with the patient through active listening and therapeutic communication  - Assess patient's level of functioning, behavior and potential for risk  - Engage patient in 1 on 1 interactions for a minimum of 15 minutes each session  - Encourage patient to express fears, feelings, frustrations, and discuss symptoms    - [place] patient to reality, help patient recognize reality-based thinking   - Administer medications as ordered and assess for potential side effects  - Provide the patient education related to the signs and symptoms of the illness and desired effects of prescribed medications  Outcome: Progressing     Problem: Ineffective Coping  Goal: Participates in unit activities  Description  Interventions:  - Provide therapeutic environment   - Provide required programming   - Redirect inappropriate behaviors   Outcome: Progressing

## 2019-07-28 NOTE — PROGRESS NOTES
Pt observed to be out in the community, sitting quietly amongst her peers  Affect was appropriate, mood was calm  During interactions she reported feeling much better and that she was clear now and not hearing anymore music  She denied any auditory or visual hallucinations  Pt denied any anxiety  She was cooperative and compliant with meds and meals  Pt checked Q7 minutes for safety

## 2019-07-28 NOTE — PROGRESS NOTES
Patient  Slept  Throughout  The  Night  , Q  7 min  Checks  For   Safety  Continued ,  Allowed to  Vent  Concerns , falling  Star  protocol  Ongoing

## 2019-07-28 NOTE — PROGRESS NOTES
Alert; oriented to person and place  Mood slightly anxious/ depressed with congruent affect  Denies thoughts of self or other-directed harm  Pleasant and cooperative on approach  Good attention to hygiene/ appearance  Out of room during am; c/o dizziness mid AM  /69; p: 64  R: 20  Color good; skin warm and dry  Safety reinforced; patient instructed to use a walker for ambulation and call for assistance when getting up or when needed for unsteady gait  Directed to easy chair in dining room  Patient stated that her PCP ordered meds to decrease sx  PTA meds reviewed; Meclazine noted on med list  Dr Shelly Sheldon notified; order rec'd for Meclazine 12 5 mg po q 8 hrs prn  Patient requested to return to her room to lay down  Sleeping soundly when approached with medication  Remained asleep for an hour  Denied dizziness upon awakening; med held  Patient did c/o returned 'voices that talk so fast"; unable to identify origin or content  Accompanied to dining room for lunch; walker in use  Will continue to monitor and address any needs expressed

## 2019-07-28 NOTE — PROGRESS NOTES
Matty Bauer#  :1927 F  OWT:[id number]    AMA:[id number]  Adm Date: 2019  8:19 PM   ATT PHY: Tamy Sheela, [address] [facility] [facility] [address]         Subjective     The patient was seen after reviewing the chart and discussing the case with caring staff  Today during our encounter, the patient reported no acute medical concerns  Earlier today patient was complaining of dizziness and lightheadedness  It was found that patient takes meclizine at home  Objective     Vitals:    19 1424   BP: 144/66   Pulse: 70   Resp: 18   Temp: 98 °F (36 7 °C)   SpO2: 98%       General Appearance: Awake and Alert  No acute distress  HEENT: Normocephalic, atraumatic  PERRLA, EOMI, MMM  Heart: RRR, Early systolic murmur heard most prominently in the aortic region  Normal S1 and S2   Lungs: CTA bilaterally with fair air entry  Assessment     [place] Race is a(n) 80y o  year old female with depression      1  Cardiac with history of Hypertension  Losartan  2  Hypothyroidism  Levothyroxine  3  GERD  Protonix  4  Seizure Disorder  Lamictal   5  Dementia  Namenda  6  DJD/OA  Tylenol as needed  7  Hyponatremia  I will reduce patient's fluid restriction to 1000 cc in 24 hours  Patient is on Sodium Chloride tablets 1g TID  8  New vitamin-D deficiency  I will put the patient on vitamin-D bolus doses for 8 weeks followed by vitamin D3 1000 units daily  9  New vitamin B12 deficiency  Patient has been put on monthly B12 injections  Show room  10  Dizziness/lightheadedness  Patient may take meclizine on as-needed basis

## 2019-07-29 PROCEDURE — 99232 SBSQ HOSP IP/OBS MODERATE 35: CPT | Performed by: NURSE PRACTITIONER

## 2019-07-29 RX ORDER — PERPHENAZINE 4 MG/1
4 TABLET, FILM COATED ORAL DAILY
Status: DISCONTINUED | OUTPATIENT
Start: 2019-07-30 | End: 2019-08-05 | Stop reason: HOSPADM

## 2019-07-29 RX ORDER — PERPHENAZINE 4 MG/1
6 TABLET, FILM COATED ORAL DAILY
Status: DISCONTINUED | OUTPATIENT
Start: 2019-07-29 | End: 2019-07-30

## 2019-07-29 RX ADMIN — SODIUM CHLORIDE TAB 1 GM 1 G: 1 TAB at 16:23

## 2019-07-29 RX ADMIN — MEMANTINE 5 MG: 5 TABLET ORAL at 17:03

## 2019-07-29 RX ADMIN — LAMOTRIGINE 75 MG: 25 TABLET ORAL at 08:18

## 2019-07-29 RX ADMIN — HYDROXYZINE HYDROCHLORIDE 25 MG: 25 TABLET ORAL at 22:14

## 2019-07-29 RX ADMIN — LOSARTAN POTASSIUM 25 MG: 50 TABLET, FILM COATED ORAL at 08:19

## 2019-07-29 RX ADMIN — LAMOTRIGINE 75 MG: 25 TABLET ORAL at 16:23

## 2019-07-29 RX ADMIN — LEVOTHYROXINE SODIUM 50 MCG: 50 TABLET ORAL at 06:04

## 2019-07-29 RX ADMIN — SODIUM CHLORIDE TAB 1 GM 1 G: 1 TAB at 08:18

## 2019-07-29 RX ADMIN — MEMANTINE 5 MG: 5 TABLET ORAL at 08:18

## 2019-07-29 RX ADMIN — PANTOPRAZOLE SODIUM 40 MG: 40 TABLET, DELAYED RELEASE ORAL at 06:04

## 2019-07-29 RX ADMIN — MIRTAZAPINE 7.5 MG: 15 TABLET, FILM COATED ORAL at 21:18

## 2019-07-29 RX ADMIN — PERPHENAZINE 4 MG: 4 TABLET, FILM COATED ORAL at 08:18

## 2019-07-29 RX ADMIN — NYSTATIN: 100000 POWDER TOPICAL at 08:22

## 2019-07-29 RX ADMIN — NYSTATIN: 100000 POWDER TOPICAL at 21:14

## 2019-07-29 RX ADMIN — SODIUM CHLORIDE TAB 1 GM 1 G: 1 TAB at 11:52

## 2019-07-29 RX ADMIN — DONEPEZIL HYDROCHLORIDE 5 MG: 5 TABLET ORAL at 21:18

## 2019-07-29 RX ADMIN — PERPHENAZINE 6 MG: 4 TABLET, FILM COATED ORAL at 17:03

## 2019-07-29 RX ADMIN — MECLIZINE 12.5 MG: 12.5 TABLET ORAL at 12:30

## 2019-07-29 NOTE — PROGRESS NOTES
Patient  Continued  With  Difficulty  Falling asleep  ,   desyrel  Was  Somewhat  Effective  And  Patient  Did  [place]  Throughout the  Night   q  7  Checks for safety

## 2019-07-29 NOTE — CASE MANAGEMENT
Patient reported intermittent recurrent episodes of dizziness which resolved without medication  She has continued to experience AH of varying type and intensity  She has remained pleasant and interactive  Attends and participates well in groups  Denies SI/HI; feels anxious relative to unwanted above- noted sx  Will continue to provide support/encouragement with aftercare recommendations

## 2019-07-29 NOTE — PROGRESS NOTES
Patient complaining of dizziness  Vital sign 158/68 pulse 66 -resp 20  Pulse ox 98% and temp 98 3  Antivert given PRN will monitor for effectiveness

## 2019-07-29 NOTE — PROGRESS NOTES
Matty Bauer#  :1927 F  AUZ:[id number]    [facility]:[id number]  Adm Date: 2019  8:19 PM   ATT PHY: Lashon Gonzalez, [address]         Subjective     The patient was seen after reviewing the chart and discussing the case with caring staff  Today during our encounter, the patient reported no acute medical concerns  Objective     Vitals:    19 0814   BP: 168/70   Pulse: 64   Resp: 20   Temp: 98 6 °F (37 °C)   SpO2:        General Appearance: Awake and Alert  No acute distress  HEENT: Normocephalic, atraumatic  PERRLA, EOMI, MMM  Heart: RRR, Early systolic murmur heard most prominently in the aortic region  Normal S1 and S2   Lungs: CTA bilaterally with fair air entry  Assessment     Scanlon Late is a(n) 80y o  year old female with depression      1  Cardiac with history of Hypertension  Losartan  2  Hypothyroidism  Levothyroxine  3  GERD  Protonix  4  Seizure Disorder  Lamictal   5  Dementia  Namenda  6  DJD/OA  Tylenol as needed  7  Hyponatremia  I will reduce patient's fluid restriction to 1000 cc in 24 hours  Patient is on Sodium Chloride tablets 1g TID  8  New vitamin-D deficiency  I will put the patient on vitamin-D bolus doses for 8 weeks followed by vitamin D3 1000 units daily  9  New vitamin B12 deficiency  Patient has been put on monthly B12 injections  Show room  10  Dizziness/lightheadedness  Patient may take Meclizine on as-needed basis

## 2019-07-29 NOTE — PROGRESS NOTES
Daily Rounds Documentation:     Team Members present:   Dr Estella Slimmer, MD Neta Kanner, MAURICE  [place], RN  Jessica Whitting[place], [facility]  Elisa Maxwell, LYNETTE Andre, [place]     PT continues to hear voices at times  PT complains of dizzy episodes  PRN for sleep  Tearful at times with regards to hallucinations, voices talking very fast and can not make out what voices are saying  PT daughter does not feel PT is at baseline per conversation on Friday  Pleasant during day  Sundowns into evening  Medication adjustment  PT to D/C on hold

## 2019-07-29 NOTE — PROGRESS NOTES
Progress Note - Tristin Juancarlos 80 y o  female MRN: [id number]  Unit/Bed#[facility] 203-01 Encounter: [id number]    The patient was seen for continuing care and reviewed with treatment team   Patient presents with bright affect today, sitting in her room upon assessment  She provides good eye contact and is well groomed  She continues to report dizziness, stating the dizziness outweighs the severity of the her auditory hallucinations  She states she feels her hallucinations are under control for the most part  She does state that dizziness is not new for her and she does not feel like it has gotten worse since starting her medications  She denies depression and anxiety, stating she hopes she can go home soon as she misses being with her daughter and granddaughter  She has been sleeping well overall with the addition of Trazodone at HS  Patient is overall upbeat and appropriate, with main complaint at this point in time of being dizzy, with subsequent unsteadiness at times  Patient denies side effects at this time  Mental Status Evaluation:  Appearance:  Adequate hygiene and grooming and Good eye contact   Behavior:  calm, cooperative and friendly   Fund of knowledge  vocabulary Average   Speech:   Language: Normal rate and Normal volume  No overt abnormality   Mood:  euthymic   Affect:   Associations: appropriate  Tightly connected   Thought Process:  Goal directed and coherent   Thought Content:  Does not verbalize delusional material   Perceptual Disturbances:  Auditory hallucinations without commands   Risk Potential: No suicidal or homicidal ideation   Orientation  Oriented x 3   Memory No deficits   Attention/Concentration attention span and concentration were age appropriate   Insight:  Good insight   Judgment: Limited   Gait/Station:  needs assistive device   Motor Activity: No abnormal movement noted     Vitals:    07/29/19 0814   BP: 168/70   Pulse: 64   Resp: 20   Temp: 98 6 °F (37 °C)   SpO2:      Progress Toward Goals: decreased hallucinations, eager to return home  Assessment/Plan    Principal Problem:    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    Plan:  Continue q 7 minute safety checks  Encourage group and milieu therapy  Continue current medications while increasing morning Trilafon to 4 mg to address hallucinations    Recommended Treatment: Continue with pharmacotherapy, group therapy, milieu therapy and occupational therapy    The patient will be maintained on the following medications:    Current Facility-Administered Medications:  acetaminophen 650 mg Oral Q6H PRN Franko Tucker MD   acetaminophen 650 mg Oral Q4H PRN Franko Tucker MD   acetaminophen 975 mg Oral Q6H PRN Franko Tucker MD   aluminum-magnesium hydroxide-simethicone 30 mL Oral Q4H PRN Sachin Nowak MD   [START ON 9/11/2019] cholecalciferol 1,000 Units Oral Daily Maricruz Parra MD   cyanocobalamin 1,000 mcg Intramuscular Q30 Days Maricruz Parra MD   donepezil 5 mg Oral HS Arcadio Chacko MD   ergocalciferol 50,000 Units Oral Weekly Maricruz Parra MD   haloperidol 1 mg Oral Q6H PRN Sachin Nowak MD   hydrOXYzine HCL 25 mg Oral Q6H PRN Arcadio Chacko MD   lamoTRIgine 75 mg Oral BID With Meals Von Barroso MD   levothyroxine 50 mcg Oral Early Morning Von Barroso MD   losartan 25 mg Oral Daily Von Barroso MD   magnesium hydroxide 30 mL Oral Daily PRN Sachin Nowak MD   meclizine 12 5 mg Oral Q8H PRN Maricruz Parra MD   memantine 5 mg Oral BID Von Barroso MD   mirtazapine 7 5 mg Oral HS Arcadio Chacko MD   nystatin  Topical BID Maricruz Parra MD   pantoprazole 40 mg Oral Early Morning MD Shahnaz Loredo Hunger ON 7/30/2019] perphenazine 4 mg Oral Daily Arcadio Chacko MD   perphenazine 6 mg Oral Daily Arcadio Chacko MD   risperiDONE 1 mg Oral Q3H PRN Sachin Nowak MD   sodium chloride 1 g Oral TID With Meals Selena Albarado PA-C   traZODone 50 mg Oral HS PRN Franko CARVALHO MD Caitlin   ziprasidone 10 mg Intramuscular Q4H PRN Edmund Eisenmenger, MD       Risks, benefits and possible side effects of Medications:   Risks, benefits, and possible side effects of medications explained to patient and patient verbalizes understanding        MAURICE Liz  7/29/2019

## 2019-07-29 NOTE — PROGRESS NOTES
Affect is  David Rosario preoccupied  With  D/C  And   Being  Unable to  Sleep   Desyrel  Po  Prn  For same , allowed to vent concerns   Positive  Feedback  For treatment gains ,  Falling  Star  protal  Ongoing

## 2019-07-29 NOTE — PROGRESS NOTES
At 8:14 am patient complained of being dizzy  Vitals - 168/70-pulse 64 -resp 20 temp 98 6 and pulse ox 98% on room air  Patient sitting in dining room waiting for breakfast  Morning medications administered and patient assessed an hour later and pt then denied dizziness  Ambulates with walker  Needs reminders to use walker  Denies pain,depression,anxiety, SI/HI, or hallucinations  Q 7 minute checks maintained  IOP within reasonable range TODAY.

## 2019-07-29 NOTE — PLAN OF CARE
Problem: Ineffective Coping  Goal: Participates in unit activities  Description  Interventions:  - Provide therapeutic environment   - Provide required programming   - Redirect inappropriate behaviors   Outcome: Progressing   Patient bright and appropriate in groups

## 2019-07-29 NOTE — PLAN OF CARE
Problem: Alteration in Thoughts and Perception  Goal: Verbalize thoughts and feelings  Description  Interventions:  - Promote a nonjudgmental and trusting relationship with the patient through active listening and therapeutic communication  - Assess patient's level of functioning, behavior and potential for risk  - Engage patient in 1 on 1 interactions for a minimum of 15 minutes each session  - Encourage patient to express fears, feelings, frustrations, and discuss symptoms    - [place] patient to reality, help patient recognize reality-based thinking   - Administer medications as ordered and assess for potential side effects  - Provide the patient education related to the signs and symptoms of the illness and desired effects of prescribed medications  Outcome: Progressing  Goal: Refrain from acting on delusional thinking/internal stimuli  Description  Interventions:  - Monitor patient closely, per order   - Utilize least restrictive measures   - Set reasonable limits, give positive feedback for acceptable   - Administer medications as ordered and monitor of potential side effects  Outcome: Progressing  Goal: Agree to be compliant with medication regime, as prescribed and report medication side effects  Description  Interventions:  - Offer appropriate PRN medication and supervise ingestion; conduct aims, as needed   Outcome: Progressing  Goal: Attend and participate in unit activities, including therapeutic, recreational, and educational groups  Description  Interventions:  - Provide therapeutic and educational activities daily, encourage attendance and participation, and document same in the medical record   Outcome: Progressing  Goal: Recognize dysfunctional thoughts, communicate reality-based thoughts at the time of discharge  Description  Interventions:  - Provide medication and psycho-education to assist patient in compliance and developing insight into his/her illness   Outcome: Progressing  Goal: Complete daily ADLs, including personal hygiene independently, as able  Description  Interventions:  - Observe, teach, and assist patient with ADLS  - Monitor and promote a balance of rest/activity, with adequate nutrition and elimination   Outcome: Progressing

## 2019-07-29 NOTE — PROGRESS NOTES
Patient has been present in milieu  She states of intermittent dizziness for which she was given antivert PRN earlier today  She attended group for a few minutes and then returned to her room to rest  She ambulates with walker and is medication complaint  Continue to monitor

## 2019-07-30 LAB
ANION GAP SERPL CALCULATED.3IONS-SCNC: 4 MMOL/L (ref 4–13)
BUN SERPL-MCNC: 17 MG/DL (ref 7–25)
CALCIUM SERPL-MCNC: 9.3 MG/DL (ref 8.6–10.5)
CHLORIDE SERPL-SCNC: 99 MMOL/L (ref 98–107)
CO2 SERPL-SCNC: 31 MMOL/L (ref 21–31)
CREAT SERPL-MCNC: 1.15 MG/DL (ref 0.6–1.2)
GFR SERPL CREATININE-BSD FRML MDRD: 42 ML/MIN/1.73SQ M
GLUCOSE SERPL-MCNC: 82 MG/DL (ref 65–99)
POTASSIUM SERPL-SCNC: 4.6 MMOL/L (ref 3.5–5.5)
SODIUM SERPL-SCNC: 134 MMOL/L (ref 134–143)

## 2019-07-30 PROCEDURE — 80048 BASIC METABOLIC PNL TOTAL CA: CPT | Performed by: PSYCHIATRY & NEUROLOGY

## 2019-07-30 RX ORDER — PERPHENAZINE 4 MG/1
8 TABLET, FILM COATED ORAL DAILY
Status: DISCONTINUED | OUTPATIENT
Start: 2019-07-30 | End: 2019-08-05 | Stop reason: HOSPADM

## 2019-07-30 RX ADMIN — SODIUM CHLORIDE TAB 1 GM 1 G: 1 TAB at 16:41

## 2019-07-30 RX ADMIN — MEMANTINE 5 MG: 5 TABLET ORAL at 08:22

## 2019-07-30 RX ADMIN — DONEPEZIL HYDROCHLORIDE 5 MG: 5 TABLET ORAL at 21:26

## 2019-07-30 RX ADMIN — SODIUM CHLORIDE TAB 1 GM 1 G: 1 TAB at 08:21

## 2019-07-30 RX ADMIN — LAMOTRIGINE 75 MG: 25 TABLET ORAL at 08:21

## 2019-07-30 RX ADMIN — LEVOTHYROXINE SODIUM 50 MCG: 50 TABLET ORAL at 05:41

## 2019-07-30 RX ADMIN — LAMOTRIGINE 75 MG: 25 TABLET ORAL at 16:41

## 2019-07-30 RX ADMIN — SODIUM CHLORIDE TAB 1 GM 1 G: 1 TAB at 12:17

## 2019-07-30 RX ADMIN — PERPHENAZINE 4 MG: 4 TABLET, FILM COATED ORAL at 08:22

## 2019-07-30 RX ADMIN — TRAZODONE HYDROCHLORIDE 50 MG: 50 TABLET ORAL at 22:37

## 2019-07-30 RX ADMIN — PANTOPRAZOLE SODIUM 40 MG: 40 TABLET, DELAYED RELEASE ORAL at 05:41

## 2019-07-30 RX ADMIN — MEMANTINE 5 MG: 5 TABLET ORAL at 17:16

## 2019-07-30 RX ADMIN — MECLIZINE 12.5 MG: 12.5 TABLET ORAL at 10:11

## 2019-07-30 RX ADMIN — MIRTAZAPINE 7.5 MG: 15 TABLET, FILM COATED ORAL at 21:25

## 2019-07-30 RX ADMIN — LOSARTAN POTASSIUM 25 MG: 50 TABLET, FILM COATED ORAL at 08:20

## 2019-07-30 RX ADMIN — PERPHENAZINE 8 MG: 4 TABLET, FILM COATED ORAL at 17:16

## 2019-07-30 NOTE — CMS CERTIFICATION NOTE
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

## 2019-07-30 NOTE — PROGRESS NOTES
Matty Bauer#  :[id number] F  VHJ:[id number]    SYX:[id number]  Adm Date: 2019  8:19 PM   ATT PHY: Arminda Guerrero, [address]         Subjective     The patient was seen after reviewing the chart and discussing the case with caring staff  Today during our encounter, the patient reported no acute medical concerns  Objective     Vitals:    19 0720   BP: (!) 179/81   Pulse: 68   Resp: 18   Temp: 98 3 °F (36 8 °C)   SpO2: 99%       General Appearance: Awake and Alert  No acute distress  HEENT: Normocephalic, atraumatic  PERRLA, EOMI, MMM  Heart: RRR, Early systolic murmur heard most prominently in the aortic region  Normal S1 and S2   Lungs: CTA bilaterally with fair air entry  Assessment     Sarah Beth Ybarra is a(n) 80y o  year old female with depression      1  Cardiac with history of Hypertension  Losartan  2  Hypothyroidism  Levothyroxine  3  GERD  Protonix  4  Seizure Disorder  Lamictal   5  Dementia  Namenda  6  DJD/OA  Tylenol as needed  7  Hyponatremia  I will reduce patient's fluid restriction to 1000 cc in 24 hours  Patient is on Sodium Chloride tablets 1g TID  8  New vitamin-D deficiency  I will put the patient on vitamin-D bolus doses for 8 weeks followed by vitamin D3 1000 units daily  9  New vitamin B12 deficiency  Patient has been put on monthly B12 injections  Show room  10  Dizziness/lightheadedness  Patient may take Meclizine on as-needed basis

## 2019-07-30 NOTE — PROGRESS NOTES
Upon initial assessment of patient, patient stated, "I feel good", denied anxiety/depression, SI/HI, hallucinations  Mood pleasant and cooperative  At approximately 2210 patient came out of room crying that the voices are "repeating everything I am saying"  Support provided, PRN Atarax given for anxiety  Patient then stated, "Do you think I need a Psychiatrist?" Patient reoriented to place, time, and situation  Remains medication compliant and on 7" checks for safety and behaviors

## 2019-07-30 NOTE — PROGRESS NOTES
Daily Rounds Documentation:     Team Members present:   MD Natali Nicole, MAURICE Ann, RN  Yael Vu, ISISW  Estefani Díaz [place], [place]     Affect appropriate; tearful at 10pm with [facility] [employer] (man's voice); prn utilized; slept throughout night (up 1x)

## 2019-07-30 NOTE — PROGRESS NOTES
Assessment at this time patient denies dizziness  She states she feels good  Slept well  Denies pain,anxiety,depression,SI/HI, or hallucinations  She is pleasant and cooperative  She is ambulatory with walker  She is forgetful but compliant  Continue to monitor Q 7 minutes for safety

## 2019-07-30 NOTE — PROGRESS NOTES
Progress Note - Tristin Juancarlos 80 y o  female MRN: [id number]  Unit/Bed#: [facility] 203-01 Encounter: [id number]          Subjective:    Pt in general seems having less hallucinations , especially in day time  Last night, after trilafon evening dose increased to 6mg, her complaining of voices appeared at 10pm, which is much late than usual  She remains crying when complaining of the voices  She is doing fine during day time, and denies hearing any voices when asked  She is otherwise fine , with pleasant conversation with others  Medication side effects:   no report and observation       Mental Status Evaluation:      Appearance:  Adequate hygiene and grooming and Good eye contact   Behavior:  calm, cooperative and friendly   Fund of knowledge  vocabulary Average   Speech:   Language: Normal rate and Normal volume  No overt abnormality   Mood:  euthymic   Affect:   Associations: appropriate  Tightly connected   Thought Process:  Goal directed and coherent   Thought Content:  Does not verbalize delusional material   Perceptual Disturbances: Auditory hallucinations without commands, only at night   Risk Potential: No suicidal or homicidal ideation   Orientation  Oriented x 3   Memory No deficits   Attention/Concentration attention span and concentration were age appropriate   Insight:  Good insight   Judgment: Limited   Gait/Station:  needs assistive device   Motor Activity: No abnormal movement noted             Assessment/Plan   Principal Problem:    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      Recommended Treatment: Continue with group therapy, milieu therapy and occupational therapy  Risks, benefits and possible side effects of Medications:   Risks, benefits, and possible side effects of medications explained to patient and patient verbalizes understanding        Medications:       all current active meds have been reviewed, continue current psychiatric medications, current meds:   Current Facility-Administered Medications   Medication Dose Route Frequency    acetaminophen (TYLENOL) tablet 650 mg  650 mg Oral Q6H PRN    acetaminophen (TYLENOL) tablet 650 mg  650 mg Oral Q4H PRN    acetaminophen (TYLENOL) tablet 975 mg  975 mg Oral Q6H PRN    aluminum-magnesium hydroxide-simethicone (MYLANTA) 200-200-20 mg/5 mL oral suspension 30 mL  30 mL Oral Q4H PRN    [START ON 9/11/2019] cholecalciferol (VITAMIN D3) tablet 1,000 Units  1,000 Units Oral Daily    cyanocobalamin injection 1,000 mcg  1,000 mcg Intramuscular Q30 Days    donepezil (ARICEPT) tablet 5 mg  5 mg Oral HS    ergocalciferol (VITAMIN D2) capsule 50,000 Units  50,000 Units Oral Weekly    haloperidol (HALDOL) tablet 1 mg  1 mg Oral Q6H PRN    hydrOXYzine HCL (ATARAX) tablet 25 mg  25 mg Oral Q6H PRN    lamoTRIgine (LaMICtal) tablet 75 mg  75 mg Oral BID With Meals    levothyroxine tablet 50 mcg  50 mcg Oral Early Morning    losartan (COZAAR) tablet 25 mg  25 mg Oral Daily    magnesium hydroxide (MILK OF MAGNESIA) 400 mg/5 mL oral suspension 30 mL  30 mL Oral Daily PRN    meclizine (ANTIVERT) tablet 12 5 mg  12 5 mg Oral Q8H PRN    memantine (NAMENDA) tablet 5 mg  5 mg Oral BID    mirtazapine (REMERON) tablet 7 5 mg  7 5 mg Oral HS    nystatin (MYCOSTATIN) powder   Topical BID    pantoprazole (PROTONIX) EC tablet 40 mg  40 mg Oral Early Morning    perphenazine tablet 4 mg  4 mg Oral Daily    perphenazine tablet 8 mg  8 mg Oral Daily    risperiDONE (RisperDAL M-TABS) dispersible tablet 1 mg  1 mg Oral Q3H PRN    sodium chloride tablet 1 g  1 g Oral TID With Meals    traZODone (DESYREL) tablet 50 mg  50 mg Oral HS PRN    ziprasidone (GEODON) IM injection 10 mg  10 mg Intramuscular Q4H PRN    and planned medication changes:     Increase trilafon to 8mg po pm     Labs:  Reviewed  Admission on 07/19/2019   Component Date Value    Sodium 07/20/2019 129*    Potassium 07/20/2019 4 1     Chloride 07/20/2019 96*    CO2 07/20/2019 26     ANION GAP 07/20/2019 7     BUN 07/20/2019 12     Creatinine 07/20/2019 0 94     Glucose 07/20/2019 100*    Glucose, Fasting 07/20/2019 100*    Calcium 07/20/2019 9 2     eGFR 07/20/2019 53     Sodium 07/22/2019 129*    Potassium 07/22/2019 5 0     Chloride 07/22/2019 96*    CO2 07/22/2019 28     ANION GAP 07/22/2019 5     BUN 07/22/2019 23     Creatinine 07/22/2019 0 98     Glucose 07/22/2019 101*    Glucose, Fasting 07/22/2019 101*    Calcium 07/22/2019 9 1     eGFR 07/22/2019 51     Vit D, 25-Hydroxy 07/23/2019 18 6*    Vitamin B-12 07/23/2019 428     Sodium 07/24/2019 129*    Potassium 07/24/2019 4 8     Chloride 07/24/2019 97*    CO2 07/24/2019 28     ANION GAP 07/24/2019 4     BUN 07/24/2019 20     Creatinine 07/24/2019 1 06     Glucose 07/24/2019 93     Calcium 07/24/2019 9 1     eGFR 07/24/2019 46     Sodium 07/26/2019 133*    Potassium 07/26/2019 4 5     Chloride 07/26/2019 100     CO2 07/26/2019 28     ANION GAP 07/26/2019 5     BUN 07/26/2019 20     Creatinine 07/26/2019 0 88     Glucose 07/26/2019 86     Glucose, Fasting 07/26/2019 86     Calcium 07/26/2019 8 9     AST 07/26/2019 16     ALT 07/26/2019 11     Alkaline Phosphatase 07/26/2019 54*    Total Protein 07/26/2019 6 6     Albumin 07/26/2019 3 9     Total Bilirubin 07/26/2019 0 40     eGFR 07/26/2019 58     Sodium 07/30/2019 134     Potassium 07/30/2019 4 6     Chloride 07/30/2019 99     CO2 07/30/2019 31     ANION GAP 07/30/2019 4     BUN 07/30/2019 17     Creatinine 07/30/2019 1 15     Glucose 07/30/2019 82     Calcium 07/30/2019 9 3     eGFR 07/30/2019 42        Counseling / Coordination of Care  Total floor / unit time spent today 20 minutes  Greater than 50% of total time was spent with the patient and / or family counseling and / or coordination of care   A description of the counseling / coordination of care: medications, treatment progress and treatment plan reviewed with patient

## 2019-07-30 NOTE — PLAN OF CARE
Problem: Alteration in Thoughts and Perception  Goal: Treatment Goal: Gain control of psychotic behaviors/thinking, reduce/eliminate presenting symptoms and demonstrate improved reality functioning upon discharge  Outcome: Progressing  Goal: Verbalize thoughts and feelings  Description  Interventions:  - Promote a nonjudgmental and trusting relationship with the patient through active listening and therapeutic communication  - Assess patient's level of functioning, behavior and potential for risk  - Engage patient in 1 on 1 interactions for a minimum of 15 minutes each session  - Encourage patient to express fears, feelings, frustrations, and discuss symptoms    - [place] patient to reality, help patient recognize reality-based thinking   - Administer medications as ordered and assess for potential side effects  - Provide the patient education related to the signs and symptoms of the illness and desired effects of prescribed medications  Outcome: Progressing  Goal: Refrain from acting on delusional thinking/internal stimuli  Description  Interventions:  - Monitor patient closely, per order   - Utilize least restrictive measures   - Set reasonable limits, give positive feedback for acceptable   - Administer medications as ordered and monitor of potential side effects  Outcome: Progressing  Goal: Agree to be compliant with medication regime, as prescribed and report medication side effects  Description  Interventions:  - Offer appropriate PRN medication and supervise ingestion; conduct aims, as needed   Outcome: Progressing  Goal: Attend and participate in unit activities, including therapeutic, recreational, and educational groups  Description  Interventions:  - Provide therapeutic and educational activities daily, encourage attendance and participation, and document same in the medical record   Outcome: Progressing  Goal: Recognize dysfunctional thoughts, communicate reality-based thoughts at the time of discharge  Description  Interventions:  - Provide medication and psycho-education to assist patient in compliance and developing insight into his/her illness   Outcome: Progressing  Goal: Complete daily ADLs, including personal hygiene independently, as able  Description  Interventions:  - Observe, teach, and assist patient with ADLS  - Monitor and promote a balance of rest/activity, with adequate nutrition and elimination   Outcome: Progressing     Problem: Ineffective Coping  Goal: Cooperates with admission process  Description  Interventions:   - Complete admission process  Outcome: Progressing  Goal: Identifies ineffective coping skills  Outcome: Progressing  Goal: Identifies healthy coping skills  Outcome: Progressing  Goal: Demonstrates healthy coping skills  Outcome: Progressing  Goal: Patient/Family participate in treatment and DC plans  Description  Interventions:  - Provide therapeutic environment  Outcome: Progressing  Goal: Patient/Family verbalizes awareness of resources  Outcome: Progressing  Goal: Understands least restrictive measures  Description  Interventions:  - Utilize least restrictive behavior  Outcome: Progressing  Goal: Free from restraint events  Description  - Utilize least restrictive measures   - Provide behavioral interventions   - Redirect inappropriate behaviors   Outcome: Progressing

## 2019-07-30 NOTE — SOCIAL WORK
SW placed phone call to pt daughter, [facility], [phone] to provide update; non-descript message left on voicemail - waiting response

## 2019-07-30 NOTE — PROGRESS NOTES
Patient has been visible in milieu throughout the day  She has denied auditory or visual hallucinations this shift  She ambulates with walker and has been medication compliant  Q 7 minute checks maintained

## 2019-07-30 NOTE — PLAN OF CARE
Problem: Ineffective Coping  Goal: Participates in unit activities  Description  Interventions:  - Provide therapeutic environment   - Provide required programming   - Redirect inappropriate behaviors   Outcome: Progressing   Patient remains pleasantly confused but bright with engagement and socialization

## 2019-07-31 ENCOUNTER — APPOINTMENT (INPATIENT)
Dept: CT IMAGING | Facility: HOSPITAL | Age: 84
DRG: 884 | End: 2019-07-31
Payer: MEDICARE

## 2019-07-31 PROCEDURE — 70470 CT HEAD/BRAIN W/O & W/DYE: CPT

## 2019-07-31 RX ORDER — QUETIAPINE FUMARATE 25 MG/1
25 TABLET, FILM COATED ORAL
Status: DISCONTINUED | OUTPATIENT
Start: 2019-07-31 | End: 2019-08-05 | Stop reason: HOSPADM

## 2019-07-31 RX ADMIN — QUETIAPINE FUMARATE 25 MG: 25 TABLET ORAL at 21:10

## 2019-07-31 RX ADMIN — LEVOTHYROXINE SODIUM 50 MCG: 50 TABLET ORAL at 05:48

## 2019-07-31 RX ADMIN — IODIXANOL 100 ML: 320 INJECTION, SOLUTION INTRAVASCULAR at 10:45

## 2019-07-31 RX ADMIN — MEMANTINE 5 MG: 5 TABLET ORAL at 08:41

## 2019-07-31 RX ADMIN — PERPHENAZINE 8 MG: 4 TABLET, FILM COATED ORAL at 17:17

## 2019-07-31 RX ADMIN — NYSTATIN: 100000 POWDER TOPICAL at 21:12

## 2019-07-31 RX ADMIN — PERPHENAZINE 4 MG: 4 TABLET, FILM COATED ORAL at 08:41

## 2019-07-31 RX ADMIN — DONEPEZIL HYDROCHLORIDE 5 MG: 5 TABLET ORAL at 21:10

## 2019-07-31 RX ADMIN — MEMANTINE 5 MG: 5 TABLET ORAL at 17:17

## 2019-07-31 RX ADMIN — LOSARTAN POTASSIUM 25 MG: 50 TABLET, FILM COATED ORAL at 08:41

## 2019-07-31 RX ADMIN — SODIUM CHLORIDE TAB 1 GM 1 G: 1 TAB at 12:38

## 2019-07-31 RX ADMIN — NYSTATIN 1 APPLICATION: 100000 POWDER TOPICAL at 08:41

## 2019-07-31 RX ADMIN — ERGOCALCIFEROL 50000 UNITS: 1.25 CAPSULE, LIQUID FILLED ORAL at 08:45

## 2019-07-31 RX ADMIN — PANTOPRAZOLE SODIUM 40 MG: 40 TABLET, DELAYED RELEASE ORAL at 05:48

## 2019-07-31 RX ADMIN — HYDROXYZINE HYDROCHLORIDE 25 MG: 25 TABLET ORAL at 22:10

## 2019-07-31 RX ADMIN — LAMOTRIGINE 75 MG: 25 TABLET ORAL at 17:17

## 2019-07-31 RX ADMIN — LAMOTRIGINE 75 MG: 25 TABLET ORAL at 08:23

## 2019-07-31 RX ADMIN — SODIUM CHLORIDE TAB 1 GM 1 G: 1 TAB at 08:23

## 2019-07-31 RX ADMIN — SODIUM CHLORIDE TAB 1 GM 1 G: 1 TAB at 17:17

## 2019-07-31 RX ADMIN — MIRTAZAPINE 7.5 MG: 15 TABLET, FILM COATED ORAL at 21:11

## 2019-07-31 NOTE — PROGRESS NOTES
Progress Note - Tristin Juancarlos 80 y o  female MRN: [id number]  Unit/Bed#: [facility] 203-01 Encounter: [id number]          Subjective:    Pt shows improvement in day time  She is quiet, pleasant looking  But in the evening and at night, she is still symptomatic with hallucinations  With increased trilafon at pm, yesterday her voices lasted shorter time,  But still frequently waken up complaining of the voices  She forgot the voices during day time  Sleeping well, eating well    Medication side effects:   no new report or observation       Mental Status Evaluation:         Appearance:  Adequate hygiene and grooming and Good eye contact   Behavior:  calm, cooperative and friendly   Fund of [facility] [employer] vocabulary Average   Speech:   Language: Normal rate and Normal volume  No overt abnormality   Mood:  euthymic   Affect:   Associations: appropriate  intact   Thought Process:  Goal directed and coherent   Thought Content:  Does not verbalize delusional material   Perceptual Disturbances: Auditory hallucinations without commands, only at night   Risk Potential: No suicidal or homicidal ideation   Orientation  Oriented x 3   Memory No deficits   Attention/Concentration attention span and concentration were age appropriate   Insight:  Good insight   Judgment: Limited   Gait/Station:  needs assistive device   Motor Activity: No abnormal movement noted              Assessment/Plan   Principal Problem:    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      Recommended Treatment: Continue with group therapy, milieu therapy and occupational therapy  Risks, benefits and possible side effects of Medications:   Risks, benefits, and possible side effects of medications explained to patient and patient verbalizes understanding        Medications:       all current active meds have been reviewed, continue current psychiatric medications and current meds:   Current Facility-Administered Medications   Medication Dose Route Frequency    acetaminophen (TYLENOL) tablet 650 mg  650 mg Oral Q6H PRN    acetaminophen (TYLENOL) tablet 650 mg  650 mg Oral Q4H PRN    acetaminophen (TYLENOL) tablet 975 mg  975 mg Oral Q6H PRN    aluminum-magnesium hydroxide-simethicone (MYLANTA) 200-200-20 mg/5 mL oral suspension 30 mL  30 mL Oral Q4H PRN    [START ON 9/11/2019] cholecalciferol (VITAMIN D3) tablet 1,000 Units  1,000 Units Oral Daily    cyanocobalamin injection 1,000 mcg  1,000 mcg Intramuscular Q30 Days    donepezil (ARICEPT) tablet 5 mg  5 mg Oral HS    ergocalciferol (VITAMIN D2) capsule 50,000 Units  50,000 Units Oral Weekly    haloperidol (HALDOL) tablet 1 mg  1 mg Oral Q6H PRN    hydrOXYzine HCL (ATARAX) tablet 25 mg  25 mg Oral Q6H PRN    lamoTRIgine (LaMICtal) tablet 75 mg  75 mg Oral BID With Meals    levothyroxine tablet 50 mcg  50 mcg Oral Early Morning    losartan (COZAAR) tablet 25 mg  25 mg Oral Daily    magnesium hydroxide (MILK OF MAGNESIA) 400 mg/5 mL oral suspension 30 mL  30 mL Oral Daily PRN    meclizine (ANTIVERT) tablet 12 5 mg  12 5 mg Oral Q8H PRN    memantine (NAMENDA) tablet 5 mg  5 mg Oral BID    mirtazapine (REMERON) tablet 7 5 mg  7 5 mg Oral HS    nystatin (MYCOSTATIN) powder   Topical BID    pantoprazole (PROTONIX) EC tablet 40 mg  40 mg Oral Early Morning    perphenazine tablet 4 mg  4 mg Oral Daily    perphenazine tablet 8 mg  8 mg Oral Daily    QUEtiapine (SEROquel) tablet 25 mg  25 mg Oral HS    risperiDONE (RisperDAL M-TABS) dispersible tablet 1 mg  1 mg Oral Q3H PRN    sodium chloride tablet 1 g  1 g Oral TID With Meals    traZODone (DESYREL) tablet 50 mg  50 mg Oral HS PRN    ziprasidone (GEODON) IM injection 10 mg  10 mg Intramuscular Q4H PRN         Add seruquel 25mg po qhs    Labs:  Reviewed  Admission on 07/19/2019   Component Date Value    Sodium 07/20/2019 129*    Potassium 07/20/2019 4 1     Chloride 07/20/2019 96*  CO2 07/20/2019 26     ANION GAP 07/20/2019 7     BUN 07/20/2019 12     Creatinine 07/20/2019 0 94     Glucose 07/20/2019 100*    Glucose, Fasting 07/20/2019 100*    Calcium 07/20/2019 9 2     eGFR 07/20/2019 53     Sodium 07/22/2019 129*    Potassium 07/22/2019 5 0     Chloride 07/22/2019 96*    CO2 07/22/2019 28     ANION GAP 07/22/2019 5     BUN 07/22/2019 23     Creatinine 07/22/2019 0 98     Glucose 07/22/2019 101*    Glucose, Fasting 07/22/2019 101*    Calcium 07/22/2019 9 1     eGFR 07/22/2019 51     Vit D, 25-Hydroxy 07/23/2019 18 6*    Vitamin B-12 07/23/2019 428     Sodium 07/24/2019 129*    Potassium 07/24/2019 4 8     Chloride 07/24/2019 97*    CO2 07/24/2019 28     ANION GAP 07/24/2019 4     BUN 07/24/2019 20     Creatinine 07/24/2019 1 06     Glucose 07/24/2019 93     Calcium 07/24/2019 9 1     eGFR 07/24/2019 46     Sodium 07/26/2019 133*    Potassium 07/26/2019 4 5     Chloride 07/26/2019 100     CO2 07/26/2019 28     ANION GAP 07/26/2019 5     BUN 07/26/2019 20     Creatinine 07/26/2019 0 88     Glucose 07/26/2019 86     Glucose, Fasting 07/26/2019 86     Calcium 07/26/2019 8 9     AST 07/26/2019 16     ALT 07/26/2019 11     Alkaline Phosphatase 07/26/2019 54*    Total Protein 07/26/2019 6 6     Albumin 07/26/2019 3 9     Total Bilirubin 07/26/2019 0 40     eGFR 07/26/2019 58     Sodium 07/30/2019 134     Potassium 07/30/2019 4 6     Chloride 07/30/2019 99     CO2 07/30/2019 31     ANION GAP 07/30/2019 4     BUN 07/30/2019 17     Creatinine 07/30/2019 1 15     Glucose 07/30/2019 82     Calcium 07/30/2019 9 3     eGFR 07/30/2019 42        Counseling / Coordination of Care  Total floor / unit time spent today 20 minutes  Greater than 50% of total time was spent with the patient and / or family counseling and / or coordination of care   A description of the counseling / coordination of care: medications, treatment progress and treatment plan reviewed with patient

## 2019-07-31 NOTE — PLAN OF CARE
Problem: Ineffective Coping  Goal: Participates in unit activities  Description  Interventions:  - Provide therapeutic environment   - Provide required programming   - Redirect inappropriate behaviors   Outcome: Progressing   Patient joining groups with participation; she needs encouragement to participate but that is due to lack of focus because of peer distractions

## 2019-07-31 NOTE — PROGRESS NOTES
Upon initial assessment patient was visible in milieu, socializing/laughing with peers  Patient denies anxiety/depression, SI/HI, hallucinations  At approximately 2145 patient came out of room crying that she had auditory hallucinations saying, "I hear voices"  Patient commented approximately 5 minutes later that the voices were gone  Patient returned to bed without difficulty only to come out at [phone] requesting PRN Trazodone for sleep, same given as ordered  Support provided, remains medication compliant and on 7" checks for safety and behaviors

## 2019-07-31 NOTE — PLAN OF CARE
Problem: Alteration in Thoughts and Perception  Goal: Treatment Goal: Gain control of psychotic behaviors/thinking, reduce/eliminate presenting symptoms and demonstrate improved reality functioning upon discharge  Outcome: Progressing  Goal: Verbalize thoughts and feelings  Description  Interventions:  - Promote a nonjudgmental and trusting relationship with the patient through active listening and therapeutic communication  - Assess patient's level of functioning, behavior and potential for risk  - Engage patient in 1 on 1 interactions for a minimum of 15 minutes each session  - Encourage patient to express fears, feelings, frustrations, and discuss symptoms    - [place] patient to reality, help patient recognize reality-based thinking   - Administer medications as ordered and assess for potential side effects  - Provide the patient education related to the signs and symptoms of the illness and desired effects of prescribed medications  Outcome: Progressing  Goal: Refrain from acting on delusional thinking/internal stimuli  Description  Interventions:  - Monitor patient closely, per order   - Utilize least restrictive measures   - Set reasonable limits, give positive feedback for acceptable   - Administer medications as ordered and monitor of potential side effects  Outcome: Progressing  Goal: Agree to be compliant with medication regime, as prescribed and report medication side effects  Description  Interventions:  - Offer appropriate PRN medication and supervise ingestion; conduct aims, as needed   Outcome: Progressing  Goal: Attend and participate in unit activities, including therapeutic, recreational, and educational groups  Description  Interventions:  - Provide therapeutic and educational activities daily, encourage attendance and participation, and document same in the medical record   Outcome: Progressing  Goal: Recognize dysfunctional thoughts, communicate reality-based thoughts at the time of discharge  Description  Interventions:  - Provide medication and psycho-education to assist patient in compliance and developing insight into his/her illness   Outcome: Progressing  Goal: Complete daily ADLs, including personal hygiene independently, as able  Description  Interventions:  - Observe, teach, and assist patient with ADLS  - Monitor and promote a balance of rest/activity, with adequate nutrition and elimination   Outcome: Progressing     Problem: Ineffective Coping  Goal: Identifies ineffective coping skills  Outcome: Progressing  Goal: Identifies healthy coping skills  Outcome: Progressing  Goal: Demonstrates healthy coping skills  Outcome: Progressing  Goal: Participates in unit activities  Description  Interventions:  - Provide therapeutic environment   - Provide required programming   - Redirect inappropriate behaviors   Outcome: Progressing  Goal: Patient/Family participate in treatment and DC plans  Description  Interventions:  - Provide therapeutic environment  Outcome: Progressing  Goal: Patient/Family verbalizes awareness of resources  Outcome: Progressing  Goal: Understands least restrictive measures  Description  Interventions:  - Utilize least restrictive behavior  Outcome: Progressing  Goal: Free from restraint events  Description  - Utilize least restrictive measures   - Provide behavioral interventions   - Redirect inappropriate behaviors   Outcome: Progressing     Problem: Nutrition/Hydration-ADULT  Goal: Nutrient/Hydration intake appropriate for improving, restoring or maintaining nutritional needs  Description  Monitor and assess patient's nutrition/hydration status for malnutrition (ex- brittle hair, bruises, dry skin, pale skin and conjunctiva, muscle wasting, smooth red tongue, and disorientation)  Collaborate with interdisciplinary team and initiate plan and interventions as ordered  Monitor patient's weight and dietary intake as ordered or per policy   Utilize nutrition screening tool and intervene per policy  Determine patient's food preferences and provide high-protein, high-caloric foods as appropriate       INTERVENTIONS:  - Monitor oral intake, urinary output, labs, and treatment plans  - Assess nutrition and hydration status and recommend course of action  - Evaluate amount of meals eaten  - Assist patient with eating if necessary   - Allow adequate time for meals  - Recommend/ encourage appropriate diets, oral nutritional supplements, and vitamin/mineral supplements  - Order, calculate, and assess calorie counts as needed  - Recommend, monitor, and adjust tube feedings and TPN/PPN based on assessed needs  - Assess need for intravenous fluids  - Provide specific nutrition/hydration education as appropriate  - Include patient/family/caregiver in decisions related to nutrition  Outcome: Progressing

## 2019-07-31 NOTE — PROGRESS NOTES
Patient with difficulty falling asleep, PRN Trazodone helpful but patient came out of room x 3 after medication was given because of hearing voices  Patient redirected back to bed, support provided  Patient eventually fell asleep at approximately 0030 and has been sleeping since  Remains on 7" checks for safety and behaviors

## 2019-07-31 NOTE — PROGRESS NOTES
Matty Bauer#  :[id number] F  MFW:[id number]    LU  Adm Date: 2019  8:19 PM   ATT PHY: Vaishali Roman, [id number] [facility]         Subjective     The patient was seen after reviewing the chart and discussing the case with caring staff  Today during our encounter, the patient reported no acute medical concerns  Objective     Vitals:    19 0709   BP: 132/60   Pulse: 66   Resp: 16   Temp: 97 9 °F (36 6 °C)   SpO2: 96%       General Appearance: Awake and Alert  No acute distress  HEENT: Normocephalic, atraumatic  PERRLA, EOMI, MMM  Heart: RRR, Early systolic murmur heard most prominently in the aortic region  Normal S1 and S2   Lungs: CTA bilaterally with fair air entry  Assessment     Junior Ken is a(n) 80y o  year old female with depression      1  Cardiac with history of Hypertension  Losartan  2  Hypothyroidism  Levothyroxine  3  GERD  Protonix  4  Seizure Disorder  Lamictal   5  Dementia  Namenda  6  DJD/OA  Tylenol as needed  7  Hyponatremia  I will reduce patient's fluid restriction to 1000 cc in 24 hours  Patient is on Sodium Chloride tablets 1g TID  8  New vitamin-D deficiency  I will put the patient on vitamin-D bolus doses for 8 weeks followed by vitamin D3 1000 units daily  9  New vitamin B12 deficiency  Patient has been put on monthly B12 injections  Show room  10  Dizziness/lightheadedness  Patient may take Meclizine on as-needed basis

## 2019-07-31 NOTE — PROGRESS NOTES
Patient compliant with dinner and medications  No behaviors noted this shift  Patient pleasant and cooperative  Social with peers  No complains of voices this shift  Patient is currently visiting with her family  Will continue to monitor for behaviors and changes

## 2019-07-31 NOTE — PROGRESS NOTES
Daily Rounds Documentation:     Team Members present:   Dr Xiomara White, MD Fredi Ferris, MAURICE Olea, RN  Lennox Landin, LSW  Petrona Alves, LPC  Eric Gottlieb, CTRS     Social, pleasant, cooperative during day; c/o AH - voices at night; prn trazodone utilized

## 2019-07-31 NOTE — PROGRESS NOTES
Patient pleasant and cooperative with care  Compliant with medications and meals  Denies any SI/HI  Patient denies any depression or anxiety, stated she did not know why she was still here  When asked if she was here because she was hearing voices, patient stated she does not hear them this morning, " but the voices do not bother me" Patient was out of the unit for CT scan this shift  No other concerns or problems reported this shift  Q 7 mins checks in place for safety  Will continue to monitor for behaviors and changes

## 2019-08-01 PROCEDURE — 99232 SBSQ HOSP IP/OBS MODERATE 35: CPT | Performed by: NURSE PRACTITIONER

## 2019-08-01 RX ADMIN — LEVOTHYROXINE SODIUM 50 MCG: 50 TABLET ORAL at 05:43

## 2019-08-01 RX ADMIN — LOSARTAN POTASSIUM 25 MG: 50 TABLET, FILM COATED ORAL at 08:24

## 2019-08-01 RX ADMIN — NYSTATIN 1 APPLICATION: 100000 POWDER TOPICAL at 08:25

## 2019-08-01 RX ADMIN — PERPHENAZINE 8 MG: 4 TABLET, FILM COATED ORAL at 17:07

## 2019-08-01 RX ADMIN — PERPHENAZINE 4 MG: 4 TABLET, FILM COATED ORAL at 08:24

## 2019-08-01 RX ADMIN — PANTOPRAZOLE SODIUM 40 MG: 40 TABLET, DELAYED RELEASE ORAL at 05:43

## 2019-08-01 RX ADMIN — MEMANTINE 5 MG: 5 TABLET ORAL at 17:07

## 2019-08-01 RX ADMIN — SODIUM CHLORIDE TAB 1 GM 1 G: 1 TAB at 15:44

## 2019-08-01 RX ADMIN — SODIUM CHLORIDE TAB 1 GM 1 G: 1 TAB at 12:20

## 2019-08-01 RX ADMIN — MEMANTINE 5 MG: 5 TABLET ORAL at 08:24

## 2019-08-01 RX ADMIN — QUETIAPINE FUMARATE 25 MG: 25 TABLET ORAL at 21:28

## 2019-08-01 RX ADMIN — LAMOTRIGINE 75 MG: 25 TABLET ORAL at 08:23

## 2019-08-01 RX ADMIN — MIRTAZAPINE 7.5 MG: 15 TABLET, FILM COATED ORAL at 21:28

## 2019-08-01 RX ADMIN — SODIUM CHLORIDE TAB 1 GM 1 G: 1 TAB at 08:24

## 2019-08-01 RX ADMIN — DONEPEZIL HYDROCHLORIDE 5 MG: 5 TABLET ORAL at 21:28

## 2019-08-01 RX ADMIN — LAMOTRIGINE 75 MG: 25 TABLET ORAL at 15:44

## 2019-08-01 NOTE — PROGRESS NOTES
Pt up ad chandrika with walker  Pt denies any suicidal or homicidal ideations  Q 7 min checks maintained to monitor pt's behavior & safety  Pt is pleasant & cooperative  Pt denies any depression or anxiety  Pt denies any auditory hallucinations @ present

## 2019-08-01 NOTE — PROGRESS NOTES
Progress Note - Tristin Juancarlos 80 y o  female MRN: [id number]  Unit/Bed#[facility] 203-01 Encounter: [id number]    The patient was seen for continuing care and reviewed with treatment team   Patient presents with a bright affect today, stating she is doing well  She states she slept well last night and denies having either auditory hallucinations today or dizziness  She appears to be very happy and hopeful about the lack of hallucinations  Patient denies side effects of medications at this time  Mental Status Evaluation:  Appearance:  Adequate hygiene and grooming and Good eye contact   Behavior:  calm, cooperative and friendly   Fund of knowledge  vocabulary Average   Speech:   Language: Normal rate and Normal volume  No overt abnormality   Mood:  euthymic   Affect:   Associations: appropriate  Tightly connected   Thought Process:  Goal directed and coherent   Thought Content:  Does not verbalize delusional material   Perceptual Disturbances: Denies hallucinations and does not appear to be responding to internal stimuli   Risk Potential: No suicidal or homicidal ideation   Orientation  Oriented x 3   Memory Not tested   Attention/Concentration attention span and concentration were age appropriate   Insight:  limited   Judgment: Limited   Gait/Station:  needs assistive device   Motor Activity: No abnormal movement noted     Vitals:    08/01/19 0700   BP: 124/61   Pulse: 69   Resp: 18   Temp: 97 7 °F (36 5 °C)   SpO2: 93%     Progress Toward Goals: medication adherent and tolerant, decreased auditory hallucinations  Sleeping better  Assessment/Plan    Principal Problem:    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    Plan:  Continue q 7 minutes safety checks  Encourage group and milieu therapy  Continue current medications    Recommended Treatment: Continue with pharmacotherapy, group therapy, milieu therapy and occupational therapy  The patient will be maintained on the following medications:    Current Facility-Administered Medications:  acetaminophen 650 mg Oral Q6H PRN Mark Gotti MD   acetaminophen 650 mg Oral Q4H PRN Mark Gotti MD   acetaminophen 975 mg Oral Q6H PRN Franko Tucker MD   aluminum-magnesium hydroxide-simethicone 30 mL Oral Q4H PRN Mark Gotti MD   [START ON 9/11/2019] cholecalciferol 1,000 Units Oral Daily Mónica Hale MD   cyanocobalamin 1,000 mcg Intramuscular Q30 Days Mónica Hale MD   donepezil 5 mg Oral HS Bolivar Carl MD   ergocalciferol 50,000 Units Oral Weekly Mónica Hale MD   haloperidol 1 mg Oral Q6H PRN Mark Gotti MD   hydrOXYzine HCL 25 mg Oral Q6H PRN Bolivar Carl MD   lamoTRIgine 75 mg Oral BID With Meals Sirisha Self MD   levothyroxine 50 mcg Oral Early Morning Sirisha Self MD   losartan 25 mg Oral Daily Sirisha Self MD   magnesium hydroxide 30 mL Oral Daily PRN Mark Gotti MD   meclizine 12 5 mg Oral Q8H PRN Mónica Hale MD   memantine 5 mg Oral BID Sirisha Self MD   mirtazapine 7 5 mg Oral HS Bolivar Carl MD   nystatin  Topical BID Mónica Hale MD   pantoprazole 40 mg Oral Early Morning Sirisha Self MD   perphenazine 4 mg Oral Daily Bolivar Carl MD   perphenazine 8 mg Oral Daily Bolivar Carl MD   QUEtiapine 25 mg Oral HS Bolivar Carl MD   risperiDONE 1 mg Oral Q3H PRN Mark Gotti MD   sodium chloride 1 g Oral TID With Meals Lane Thomas PA-C   traZODone 50 mg Oral HS PRN Mark Gotti MD   ziprasidone 10 mg Intramuscular Q4H PRN Mark Gotti MD       Risks, benefits and possible side effects of Medications:   Risks, benefits, and possible side effects of medications explained to patient and patient verbalizes understanding        MAURICE Mejia  8/1/2019

## 2019-08-01 NOTE — PROGRESS NOTES
Matty Bauer#  :[id number] F  PYA:[id number]    JBO:[id number]  Adm Date: 2019  8:19 PM   ATT PHY: Yaneth Turner, [address]         Subjective     The patient was seen after reviewing the chart and discussing the case with caring staff  Today during our encounter, the patient reported no acute medical concerns  Objective     Vitals:    19 0700   BP: 124/61   Pulse: 69   Resp: 18   Temp: 97 7 °F (36 5 °C)   SpO2: 93%       General Appearance: Awake and Alert  No acute distress  HEENT: Normocephalic, atraumatic  PERRLA, EOMI, MMM  Heart: RRR, Early systolic murmur heard most prominently in the aortic region  Normal S1 and S2   Lungs: CTA bilaterally with fair air entry  Assessment     Theta Chey is a(n) 80y o  year old female with depression      1  Cardiac with history of Hypertension  Losartan  2  Hypothyroidism  Levothyroxine  3  GERD  Protonix  4  Seizure Disorder  Lamictal   5  Dementia  Namenda  6  DJD/OA  Tylenol as needed  7  Hyponatremia  I will reduce patient's fluid restriction to 1000 cc in 24 hours  Patient is on Sodium Chloride tablets 1g TID  8  Dizziness/lightheadedness  Patient may take Meclizine on as-needed basis  9  New vitamin-D deficiency  I will put the patient on vitamin-D bolus doses for 8 weeks followed by vitamin D3 1000 units daily  10  New vitamin B12 deficiency  Patient has been put on monthly B12 injections    Show room

## 2019-08-01 NOTE — PROGRESS NOTES
Daily Rounds Documentation:     Team Members present:   MD Stanislaw Reis, RN  Herbert Cerda, LSW  Emilio Wu [facility], CTRS     , slept with PRN  Denies anxiety and depression, tearfulness has subsided  D/C next week

## 2019-08-01 NOTE — PROGRESS NOTES
Patient visible in milieu, conversing with staff and peers  Mood pleasant and cooperative  Patient denied anxiety/depression, SI/HI, hallucinations  Patient returned to room to go to sleep, took medication without difficulty  Patient stated she had auditory hallucinations of singing and named the song "[facility]"  Patient stated the song was more distant and that it "really didn't bother her much" but that the [address] can get "annoying"  Patient also stated she feels that the [address] occur only at night  At 2208 patient came out of room, tearful, anxious over AH of "I hear voices"  PRN Atarax given as ordered, support provided, patient returned to bed  Remains medication compliant and on 7" checks for safety and behaviors

## 2019-08-01 NOTE — PROGRESS NOTES
Patient with difficulty falling asleep  PRN Atarax effective in reducing anxiety allowing patient to fall asleep  At approximately 0100 staff heard patient crying in sleep with noted restlessness, episode self limiting, patient remained asleep  No further distress noted  Remains on 7" checks for safety and behaviors

## 2019-08-01 NOTE — PLAN OF CARE
Problem: Ineffective Coping  Goal: Participates in unit activities  Description  Interventions:  - Provide therapeutic environment   - Provide required programming   - Redirect inappropriate behaviors   Outcome: Progressing   Patient continues to be bright and funny in groups; she is social and interactive with staff and peers

## 2019-08-02 PROCEDURE — 99231 SBSQ HOSP IP/OBS SF/LOW 25: CPT | Performed by: NURSE PRACTITIONER

## 2019-08-02 RX ADMIN — DONEPEZIL HYDROCHLORIDE 5 MG: 5 TABLET ORAL at 21:10

## 2019-08-02 RX ADMIN — QUETIAPINE FUMARATE 25 MG: 25 TABLET ORAL at 21:09

## 2019-08-02 RX ADMIN — NYSTATIN 1 APPLICATION: 100000 POWDER TOPICAL at 08:29

## 2019-08-02 RX ADMIN — LEVOTHYROXINE SODIUM 50 MCG: 50 TABLET ORAL at 06:14

## 2019-08-02 RX ADMIN — PERPHENAZINE 8 MG: 4 TABLET, FILM COATED ORAL at 17:27

## 2019-08-02 RX ADMIN — SODIUM CHLORIDE TAB 1 GM 1 G: 1 TAB at 08:27

## 2019-08-02 RX ADMIN — MEMANTINE 5 MG: 5 TABLET ORAL at 08:28

## 2019-08-02 RX ADMIN — MIRTAZAPINE 7.5 MG: 15 TABLET, FILM COATED ORAL at 21:11

## 2019-08-02 RX ADMIN — LAMOTRIGINE 75 MG: 25 TABLET ORAL at 08:27

## 2019-08-02 RX ADMIN — LAMOTRIGINE 75 MG: 25 TABLET ORAL at 16:30

## 2019-08-02 RX ADMIN — SODIUM CHLORIDE TAB 1 GM 1 G: 1 TAB at 16:29

## 2019-08-02 RX ADMIN — LOSARTAN POTASSIUM 25 MG: 50 TABLET, FILM COATED ORAL at 08:28

## 2019-08-02 RX ADMIN — MEMANTINE 5 MG: 5 TABLET ORAL at 17:27

## 2019-08-02 RX ADMIN — SODIUM CHLORIDE TAB 1 GM 1 G: 1 TAB at 12:13

## 2019-08-02 RX ADMIN — PANTOPRAZOLE SODIUM 40 MG: 40 TABLET, DELAYED RELEASE ORAL at 06:14

## 2019-08-02 RX ADMIN — NYSTATIN: 100000 POWDER TOPICAL at 21:11

## 2019-08-02 RX ADMIN — TRAZODONE HYDROCHLORIDE 50 MG: 50 TABLET ORAL at 21:44

## 2019-08-02 RX ADMIN — PERPHENAZINE 4 MG: 4 TABLET, FILM COATED ORAL at 08:28

## 2019-08-02 NOTE — PROGRESS NOTES
Patient has remains in bed sleeping through the night without difficulty  No acute behaviors exhibited  No restlessness observed  Will CTM via q7 minute safety checks

## 2019-08-02 NOTE — SOCIAL WORK
Sw received phone call from pt daughter, Thor Malin, [phone];  SW advised there is no CITLALY on chart - advised daughter that SW will speak with patient and if agreeable, SW will obtain CITLALY and call pt daughter back

## 2019-08-02 NOTE — PROGRESS NOTES
Pt denies any depression or anxiety  Q 7 min checks maintained to monitor pt's behavior & safety  Pt denies any suicidal or homicidal ideations  Pt denies any auditory hallucinations  Pt states" It's a miracle that the voices are gone"  Pt up ad chandrika with walker

## 2019-08-02 NOTE — SOCIAL WORK
SW met with patient and obtained CITLALY for pt daughter Luda Earl    SW placed phone call to pt daughter, Luda Earl; provided update; SW and pt daughter discussed pt behaviors, meds and discharge plan; pt daughter will provide transport on Monday at 3pm; no additional questions or concerns for SW at this time; call mutually ended

## 2019-08-02 NOTE — PROGRESS NOTES
Patient was present in the community this evening  She is pleasant and cooperative with staff; social with peers  No acute behaviors exhibited  She denies pain  She denies anxiety, depression, SI, HI and hallucinations  She informs that she did not hear any voices today  Patient is focused on discharge and has asked this writer on two separate occasions when she will be leaving  Reassurances provided and informed patient that once her discharge information is available, she will be informed  She was in agreement  Patient was medication compliant at HS  Fluid restriction maintained as per order  Will CTM via q7 minute safety checks

## 2019-08-02 NOTE — PLAN OF CARE
Problem: Ineffective Coping  Goal: Participates in unit activities  Description  Interventions:  - Provide therapeutic environment   - Provide required programming   - Redirect inappropriate behaviors   Outcome: Progressing   Patient remains bright and social; discharge planned for Monday

## 2019-08-02 NOTE — PROGRESS NOTES
Daily Rounds Documentation:     Team Members present:   MD Carla Nicholas, RN  Marcos Leyva, [place]RA[place]  [place], [place]     Better night - no voices; slept all night; discharge Monday

## 2019-08-02 NOTE — PROGRESS NOTES
Matty Bauer#  :[id number] F  IIJ:[id number]    QRR:[id number]  Adm Date: 2019  8:19 PM   ATT PHY: Jamil Gary, [address]         Subjective     The patient was seen after reviewing the chart and discussing the case with caring staff  Today during our encounter, the patient reported no acute medical concerns  Objective     Vitals:    19 0729   BP: 151/66   Pulse: 79   Resp: 16   Temp: 98 °F (36 7 °C)   SpO2: 97%       General Appearance: Awake and Alert  No acute distress  HEENT: Normocephalic, atraumatic  PERRLA, EOMI, MMM  Heart: RRR, Early systolic murmur heard most prominently in the aortic region  Normal S1 and S2   Lungs: CTA bilaterally with fair air entry  Assessment     Maryann Mir is a(n) 80y o  year old female with depression      1  Cardiac with history of Hypertension  Losartan  2  Hypothyroidism  Levothyroxine  3  GERD  Protonix  4  Seizure Disorder  Lamictal   5  Dementia  Namenda  6  DJD/OA  Tylenol as needed  7  Hyponatremia  I will reduce patient's fluid restriction to 1000 cc in 24 hours  Patient is on Sodium Chloride tablets 1g TID  8  Dizziness/lightheadedness  Patient may take Meclizine on as-needed basis  9  Vitamin-D deficiency  I will put the patient on vitamin-D bolus doses for 8 weeks followed by vitamin D3 1000 units daily  10  Vitamin B12 deficiency  Patient has been put on monthly B12 injections    Show room

## 2019-08-02 NOTE — PROGRESS NOTES
Progress Note - Tristin Juancarlos 80 y o  female MRN: [id number]  Unit/Bed#[facility] 203-01 Encounter: [id number]    The patient was seen for continuing care and reviewed with treatment team   Patient presents with bright affect today, stating "It's a miracle! I am not dizzy and I don't hear any voices!"  Patient states she slept well last night and does not recall if she was anxious or not, but she did not require PRN medication at HS  She denies depression and anxiety, and has been participatory in groups  Patient hopeful for discharge soon and is agreeable to discharging on Monday pending continued stabilization  Patient denies side effects of medications      Mental Status Evaluation:  Appearance:  Adequate hygiene and grooming and Good eye contact   Behavior:  calm and cooperative   Fund of knowledge  vocabulary Average   Speech:   Language: Normal rate and Normal volume  Able to name objects and Able to repeat phrases   Mood: 2 euthymic   Affect:   Associations: appropriate  Tightly connected   Thought Process:  Goal directed and coherent   Thought Content:  Does not verbalize delusional material   Perceptual Disturbances: Denies hallucinations and does not appear to be responding to internal stimuli   Risk Potential: No suicidal or homicidal ideation   Orientation  Oriented x 3   Memory No deficits   Attention/Concentration attention span and concentration were age appropriate   Insight:  Good insight   Judgment: Good judgment   Gait/Station:  needs assistive device   Motor Activity: No abnormal movement noted     Vitals:    08/02/19 0729   BP: 151/66   Pulse: 79   Resp: 16   Temp: 98 °F (36 7 °C)   SpO2: 97%     Progress Toward Goals: medication adherent and tolerant, denies hallucinations, discharge Monday pending continued stabilization    Assessment/Plan    Principal Problem:    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    Plan:  Continue q 7 minute safety checks  Encourage group and milieu therapy  Continue current medications  Discharge planned for Monday pending continued stabilization    Recommended Treatment: Continue with pharmacotherapy, group therapy, milieu therapy and occupational therapy    The patient will be maintained on the following medications:    Current Facility-Administered Medications:  acetaminophen 650 mg Oral Q6H PRN Meghann Hay MD   acetaminophen 650 mg Oral Q4H PRN Franko Tucker MD   acetaminophen 975 mg Oral Q6H PRN Franko Tucker MD   aluminum-magnesium hydroxide-simethicone 30 mL Oral Q4H PRN Meghann Hay MD   [START ON 9/11/2019] cholecalciferol 1,000 Units Oral Daily Adam Platt MD   cyanocobalamin 1,000 mcg Intramuscular Q30 Days Adam Platt MD   donepezil 5 mg Oral HS Marycruz Lewis MD   ergocalciferol 50,000 Units Oral Weekly Adam Platt MD   haloperidol 1 mg Oral Q6H PRN Meghann Hay MD   hydrOXYzine HCL 25 mg Oral Q6H PRN Marycruz Lewis MD   lamoTRIgine 75 mg Oral BID With Meals Karen Goddard MD   levothyroxine 50 mcg Oral Early Morning Karen Goddard MD   losartan 25 mg Oral Daily Karen Goddard MD   magnesium hydroxide 30 mL Oral Daily PRN Meghann Hay MD   meclizine 12 5 mg Oral Q8H PRN Adam Platt MD   memantine 5 mg Oral BID Karen Goddard MD   mirtazapine 7 5 mg Oral HS Marycruz Lewis MD   nystatin  Topical BID Adam Platt MD   pantoprazole 40 mg Oral Early Morning Karen Goddard MD   perphenazine 4 mg Oral Daily Marycruz Lewis MD   perphenazine 8 mg Oral Daily Mraycruz Lewis MD   QUEtiapine 25 mg Oral HS Marycruz Lewis MD   risperiDONE 1 mg Oral Q3H PRN Meghann Hay MD   sodium chloride 1 g Oral TID With Meals Lionel Jackson PA-C   traZODone 50 mg Oral HS PRN Meghann Hay MD   ziprasidone 10 mg Intramuscular Q4H PRN Meghann Hay MD       Risks, benefits and possible side effects of Medications:   Risks, benefits, and possible side effects of medications explained to patient and patient verbalizes understanding        MAURICE Loja  8/2/2019

## 2019-08-03 PROCEDURE — 99231 SBSQ HOSP IP/OBS SF/LOW 25: CPT | Performed by: PSYCHIATRY & NEUROLOGY

## 2019-08-03 RX ORDER — NYSTATIN 100000 [USP'U]/G
POWDER TOPICAL 2 TIMES DAILY
Qty: 15 G | Refills: 1 | Status: SHIPPED | OUTPATIENT
Start: 2019-08-03 | End: 2019-09-02

## 2019-08-03 RX ORDER — SIMVASTATIN 20 MG
20 TABLET ORAL
Qty: 30 TABLET | Refills: 0 | Status: SHIPPED | OUTPATIENT
Start: 2019-08-03 | End: 2019-08-26 | Stop reason: SDUPTHER

## 2019-08-03 RX ORDER — CYANOCOBALAMIN 1000 UG/ML
1000 INJECTION INTRAMUSCULAR; SUBCUTANEOUS
Qty: 1 ML | Refills: 0 | Status: SHIPPED | OUTPATIENT
Start: 2019-08-23 | End: 2019-08-26 | Stop reason: SDUPTHER

## 2019-08-03 RX ORDER — ERGOCALCIFEROL 1.25 MG/1
50000 CAPSULE ORAL WEEKLY
Qty: 5 CAPSULE | Refills: 0 | Status: SHIPPED | OUTPATIENT
Start: 2019-08-07 | End: 2019-09-12

## 2019-08-03 RX ORDER — LOSARTAN POTASSIUM 25 MG/1
25 TABLET ORAL DAILY
Qty: 30 TABLET | Refills: 0 | Status: SHIPPED | OUTPATIENT
Start: 2019-08-03 | End: 2019-08-26 | Stop reason: SDUPTHER

## 2019-08-03 RX ORDER — SODIUM CHLORIDE 1000 MG
1 TABLET, SOLUBLE MISCELLANEOUS
Qty: 90 TABLET | Refills: 0 | Status: SHIPPED | OUTPATIENT
Start: 2019-08-03 | End: 2019-08-26 | Stop reason: SDUPTHER

## 2019-08-03 RX ORDER — LEVOTHYROXINE SODIUM 0.05 MG/1
50 TABLET ORAL
Qty: 30 TABLET | Refills: 0 | Status: SHIPPED | OUTPATIENT
Start: 2019-08-03 | End: 2019-08-26 | Stop reason: SDUPTHER

## 2019-08-03 RX ORDER — MECLIZINE HCL 12.5 MG/1
12.5 TABLET ORAL EVERY 8 HOURS PRN
Qty: 30 TABLET | Refills: 0 | Status: SHIPPED | OUTPATIENT
Start: 2019-08-03 | End: 2019-08-26 | Stop reason: SDUPTHER

## 2019-08-03 RX ORDER — PANTOPRAZOLE SODIUM 40 MG/1
40 TABLET, DELAYED RELEASE ORAL
Qty: 30 TABLET | Refills: 0 | Status: SHIPPED | OUTPATIENT
Start: 2019-08-04 | End: 2019-08-26 | Stop reason: SDUPTHER

## 2019-08-03 RX ADMIN — QUETIAPINE FUMARATE 25 MG: 25 TABLET ORAL at 21:44

## 2019-08-03 RX ADMIN — PANTOPRAZOLE SODIUM 40 MG: 40 TABLET, DELAYED RELEASE ORAL at 06:49

## 2019-08-03 RX ADMIN — PERPHENAZINE 4 MG: 4 TABLET, FILM COATED ORAL at 08:54

## 2019-08-03 RX ADMIN — NYSTATIN: 100000 POWDER TOPICAL at 09:52

## 2019-08-03 RX ADMIN — SODIUM CHLORIDE TAB 1 GM 1 G: 1 TAB at 08:54

## 2019-08-03 RX ADMIN — NYSTATIN 1 APPLICATION: 100000 POWDER TOPICAL at 21:49

## 2019-08-03 RX ADMIN — SODIUM CHLORIDE TAB 1 GM 1 G: 1 TAB at 12:01

## 2019-08-03 RX ADMIN — PERPHENAZINE 8 MG: 4 TABLET, FILM COATED ORAL at 17:28

## 2019-08-03 RX ADMIN — MEMANTINE 5 MG: 5 TABLET ORAL at 17:28

## 2019-08-03 RX ADMIN — LEVOTHYROXINE SODIUM 50 MCG: 50 TABLET ORAL at 06:49

## 2019-08-03 RX ADMIN — MIRTAZAPINE 7.5 MG: 15 TABLET, FILM COATED ORAL at 21:44

## 2019-08-03 RX ADMIN — LAMOTRIGINE 75 MG: 25 TABLET ORAL at 08:53

## 2019-08-03 RX ADMIN — DONEPEZIL HYDROCHLORIDE 5 MG: 5 TABLET ORAL at 21:44

## 2019-08-03 RX ADMIN — SODIUM CHLORIDE TAB 1 GM 1 G: 1 TAB at 17:28

## 2019-08-03 RX ADMIN — LOSARTAN POTASSIUM 25 MG: 50 TABLET, FILM COATED ORAL at 08:55

## 2019-08-03 RX ADMIN — LAMOTRIGINE 75 MG: 25 TABLET ORAL at 17:28

## 2019-08-03 RX ADMIN — MECLIZINE 12.5 MG: 12.5 TABLET ORAL at 09:55

## 2019-08-03 RX ADMIN — MEMANTINE 5 MG: 5 TABLET ORAL at 08:54

## 2019-08-03 NOTE — PLAN OF CARE
Problem: Alteration in Thoughts and Perception  Goal: Treatment Goal: Gain control of psychotic behaviors/thinking, reduce/eliminate presenting symptoms and demonstrate improved reality functioning upon discharge  Outcome: Progressing  Goal: Verbalize thoughts and feelings  Description  Interventions:  - Promote a nonjudgmental and trusting relationship with the patient through active listening and therapeutic communication  - Assess patient's level of functioning, behavior and potential for risk  - Engage patient in 1 on 1 interactions for a minimum of 15 minutes each session  - Encourage patient to express fears, feelings, frustrations, and discuss symptoms    - [place] patient to reality, help patient recognize reality-based thinking   - Administer medications as ordered and assess for potential side effects  - Provide the patient education related to the signs and symptoms of the illness and desired effects of prescribed medications  Outcome: Progressing  Goal: Refrain from acting on delusional thinking/internal stimuli  Description  Interventions:  - Monitor patient closely, per order   - Utilize least restrictive measures   - Set reasonable limits, give positive feedback for acceptable   - Administer medications as ordered and monitor of potential side effects  Outcome: Progressing  Goal: Agree to be compliant with medication regime, as prescribed and report medication side effects  Description  Interventions:  - Offer appropriate PRN medication and supervise ingestion; conduct aims, as needed   Outcome: Progressing  Goal: Attend and participate in unit activities, including therapeutic, recreational, and educational groups  Description  Interventions:  - Provide therapeutic and educational activities daily, encourage attendance and participation, and document same in the medical record   Outcome: Progressing  Goal: Recognize dysfunctional thoughts, communicate reality-based thoughts at the time of discharge  Description  Interventions:  - Provide medication and psycho-education to assist patient in compliance and developing insight into his/her illness   Outcome: Progressing  Goal: Complete daily ADLs, including personal hygiene independently, as able  Description  Interventions:  - Observe, teach, and assist patient with ADLS  - Monitor and promote a balance of rest/activity, with adequate nutrition and elimination   Outcome: Progressing     Problem: Nutrition/Hydration-ADULT  Goal: Nutrient/Hydration intake appropriate for improving, restoring or maintaining nutritional needs  Description  Monitor and assess patient's nutrition/hydration status for malnutrition (ex- brittle hair, bruises, dry skin, pale skin and conjunctiva, muscle wasting, smooth red tongue, and disorientation)  Collaborate with interdisciplinary team and initiate plan and interventions as ordered  Monitor patient's weight and dietary intake as ordered or per policy  Utilize nutrition screening tool and intervene per policy  Determine patient's food preferences and provide high-protein, high-caloric foods as appropriate       INTERVENTIONS:  - Monitor oral intake, urinary output, labs, and treatment plans  - Assess nutrition and hydration status and recommend course of action  - Evaluate amount of meals eaten  - Assist patient with eating if necessary   - Allow adequate time for meals  - Recommend/ encourage appropriate diets, oral nutritional supplements, and vitamin/mineral supplements  - Order, calculate, and assess calorie counts as needed  - Recommend, monitor, and adjust tube feedings and TPN/PPN based on assessed needs  - Assess need for intravenous fluids  - Provide specific nutrition/hydration education as appropriate  - Include patient/family/caregiver in decisions related to nutrition  Outcome: Progressing

## 2019-08-03 NOTE — PROGRESS NOTES
Shannan Chacon from [address] returned call and stated she spoke with radiology and they'll be reading patient's CT from 7/31/19

## 2019-08-03 NOTE — NURSING NOTE
Pt present in the milieu; somewhat social with peers  Affect brightens on approach  Periods of increase anxiety  Pt came to desk upset stating, " I don't hear the voices during the day but I hear them now at night they repeat whatever I think " One on one time spent with patient  Indication of Seroquel explained  PRN sleep aid provided  Will continue to monitor

## 2019-08-03 NOTE — PROGRESS NOTES
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      Behavior over the last 24 hours:  improved  Sleep: normal  Appetite: normal  Medication side effects: No  ROS: no complaints    Mental Status Evaluation:  Appearance:  age appropriate and casually dressed   Behavior:  cooperative   Speech:  normal pitch and normal volume   Mood:  normal   Affect:  normal   Thought Process:  goal directed and logical   Thought Content:  normal   Perceptual Disturbances: None   Risk Potential: Suicidal Ideations none, Homicidal Ideations none and Potential for Aggression No   Sensorium:  person, place and time/date   Cognition:  grossly intact   Consciousness:  alert and awake    Attention: attention span appeared shorter than expected for age   Insight:  limited   Judgment: limited   Gait/Station: normal gait/station and normal balance   Motor Activity: no abnormal movements     Progress Toward Goals: Patient is compliant with medications and denies side effects  She is looking forward to be discharged on Monday  She is pleasant on approach and enjoys conversation  Denies depressed mood and denies SI  Recommended Treatment: Continue with group therapy, milieu therapy and occupational therapy  Risks, benefits and possible side effects of Medications:   Risks, benefits, and possible side effects of medications explained to patient and patient verbalizes understanding        Medications:   all current active meds have been reviewed, continue current psychiatric medications and current meds:   Current Facility-Administered Medications   Medication Dose Route Frequency    acetaminophen (TYLENOL) tablet 650 mg  650 mg Oral Q6H PRN    acetaminophen (TYLENOL) tablet 650 mg  650 mg Oral Q4H PRN    acetaminophen (TYLENOL) tablet 975 mg  975 mg Oral Q6H PRN    aluminum-magnesium hydroxide-simethicone (MYLANTA) 200-200-20 mg/5 mL oral suspension 30 mL  30 mL Oral Q4H PRN    [START ON 9/11/2019] cholecalciferol (VITAMIN D3) tablet 1,000 Units  1,000 Units Oral Daily    cyanocobalamin injection 1,000 mcg  1,000 mcg Intramuscular Q30 Days    donepezil (ARICEPT) tablet 5 mg  5 mg Oral HS    ergocalciferol (VITAMIN D2) capsule 50,000 Units  50,000 Units Oral Weekly    haloperidol (HALDOL) tablet 1 mg  1 mg Oral Q6H PRN    hydrOXYzine HCL (ATARAX) tablet 25 mg  25 mg Oral Q6H PRN    lamoTRIgine (LaMICtal) tablet 75 mg  75 mg Oral BID With Meals    levothyroxine tablet 50 mcg  50 mcg Oral Early Morning    losartan (COZAAR) tablet 25 mg  25 mg Oral Daily    magnesium hydroxide (MILK OF MAGNESIA) 400 mg/5 mL oral suspension 30 mL  30 mL Oral Daily PRN    meclizine (ANTIVERT) tablet 12 5 mg  12 5 mg Oral Q8H PRN    memantine (NAMENDA) tablet 5 mg  5 mg Oral BID    mirtazapine (REMERON) tablet 7 5 mg  7 5 mg Oral HS    nystatin (MYCOSTATIN) powder   Topical BID    pantoprazole (PROTONIX) EC tablet 40 mg  40 mg Oral Early Morning    perphenazine tablet 4 mg  4 mg Oral Daily    perphenazine tablet 8 mg  8 mg Oral Daily    QUEtiapine (SEROquel) tablet 25 mg  25 mg Oral HS    risperiDONE (RisperDAL M-TABS) dispersible tablet 1 mg  1 mg Oral Q3H PRN    sodium chloride tablet 1 g  1 g Oral TID With Meals    traZODone (DESYREL) tablet 50 mg  50 mg Oral HS PRN    ziprasidone (GEODON) IM injection 10 mg  10 mg Intramuscular Q4H PRN     Labs: reviewed    Counseling / Coordination of Care  Total floor / unit time spent today n/a minutes  Greater than 50% of total time was spent with the patient and / or family counseling and / or coordination of care   A description of the counseling / coordination of care:

## 2019-08-03 NOTE — PROGRESS NOTES
Patient currently resting in bed due to feeling dizzy, prn Antivert administered at 0955 this morning  Patient came out for breakfast this morning social with select peers  Patient denies any voices this morning and states, "I tell them to go away " Patient stated she felt so good during breakfast that, "I can dance " Patient denies  anxiety and depression  Patient denies any pain  Q 7 minute checks maintained  Monitoring continues

## 2019-08-03 NOTE — PROGRESS NOTES
Matty Bauer#  :1927 F  WC    PDW:[id number]  Adm Date: 2019  8:19 PM   ATT PHY: Tori Ohara, [facility] [address]         [facility]     The patient was seen after reviewing the chart and discussing the case with caring staff  Today during our encounter, the patient reported no acute medical concerns  Patient is a possible discharge for Monday  Patient with is requesting all her prescriptions  I reviewed and reconciled patient's problem list and medications  Objective     Vitals:    19 0756   BP: 140/65   Pulse: 63   Resp: 18   Temp: 97 8 °F (36 6 °C)   SpO2: 97%       General Appearance: Awake and Alert  No acute distress  HEENT: Normocephalic, atraumatic  PERRLA, EOMI, MMM  Heart: RRR, Early systolic murmur heard most prominently in the aortic region  Normal S1 and S2   Lungs: CTA bilaterally with fair air entry  Assessment     Juan Luis Liu is a(n) 80y o  year old female with depression      MEDICAL CLEARANCE  Patient is medically cleared for discharge  All scripts have been written for the patient  1  Cardiac with history of Hypertension  Losartan  2  Hypothyroidism  Levothyroxine  3  GERD  Protonix  4  Seizure Disorder  Lamictal   5  Dementia  Namenda  6  DJD/OA  Tylenol as needed  7  Hyponatremia  I will reduce patient's fluid restriction to 1000 cc in 24 hours  Patient is on Sodium Chloride tablets 1g TID  8  Dizziness/lightheadedness  Patient may take Meclizine on as-needed basis  9  Vitamin-D deficiency  I will put the patient on vitamin-D bolus doses for 8 weeks followed by vitamin D3 1000 units daily  10  Vitamin B12 deficiency  Patient has been put on monthly B12 injections    Show room

## 2019-08-03 NOTE — NURSING NOTE
PRN trazodone effective  Pt found to be sleeping on most of authors rounds  Will continue to monitor

## 2019-08-04 PROCEDURE — 99231 SBSQ HOSP IP/OBS SF/LOW 25: CPT | Performed by: NURSE PRACTITIONER

## 2019-08-04 RX ADMIN — PANTOPRAZOLE SODIUM 40 MG: 40 TABLET, DELAYED RELEASE ORAL at 06:28

## 2019-08-04 RX ADMIN — LAMOTRIGINE 75 MG: 25 TABLET ORAL at 08:46

## 2019-08-04 RX ADMIN — NYSTATIN: 100000 POWDER TOPICAL at 08:48

## 2019-08-04 RX ADMIN — LAMOTRIGINE 75 MG: 25 TABLET ORAL at 17:00

## 2019-08-04 RX ADMIN — SODIUM CHLORIDE TAB 1 GM 1 G: 1 TAB at 17:00

## 2019-08-04 RX ADMIN — SODIUM CHLORIDE TAB 1 GM 1 G: 1 TAB at 12:17

## 2019-08-04 RX ADMIN — NYSTATIN: 100000 POWDER TOPICAL at 22:35

## 2019-08-04 RX ADMIN — QUETIAPINE FUMARATE 25 MG: 25 TABLET ORAL at 21:54

## 2019-08-04 RX ADMIN — PERPHENAZINE 8 MG: 4 TABLET, FILM COATED ORAL at 17:51

## 2019-08-04 RX ADMIN — MEMANTINE 5 MG: 5 TABLET ORAL at 17:51

## 2019-08-04 RX ADMIN — MIRTAZAPINE 7.5 MG: 15 TABLET, FILM COATED ORAL at 21:55

## 2019-08-04 RX ADMIN — PERPHENAZINE 4 MG: 4 TABLET, FILM COATED ORAL at 08:46

## 2019-08-04 RX ADMIN — LEVOTHYROXINE SODIUM 50 MCG: 50 TABLET ORAL at 06:28

## 2019-08-04 RX ADMIN — SODIUM CHLORIDE TAB 1 GM 1 G: 1 TAB at 08:46

## 2019-08-04 RX ADMIN — MEMANTINE 5 MG: 5 TABLET ORAL at 08:47

## 2019-08-04 RX ADMIN — LOSARTAN POTASSIUM 25 MG: 50 TABLET, FILM COATED ORAL at 08:46

## 2019-08-04 RX ADMIN — DONEPEZIL HYDROCHLORIDE 5 MG: 5 TABLET ORAL at 21:55

## 2019-08-04 NOTE — PLAN OF CARE
Problem: Alteration in Thoughts and Perception  Goal: Treatment Goal: Gain control of psychotic behaviors/thinking, reduce/eliminate presenting symptoms and demonstrate improved reality functioning upon discharge  Outcome: Progressing  Goal: Verbalize thoughts and feelings  Description  Interventions:  - Promote a nonjudgmental and trusting relationship with the patient through active listening and therapeutic communication  - Assess patient's level of functioning, behavior and potential for risk  - Engage patient in 1 on 1 interactions for a minimum of 15 minutes each session  - Encourage patient to express fears, feelings, frustrations, and discuss symptoms    - [place] patient to reality, help patient recognize reality-based thinking   - Administer medications as ordered and assess for potential side effects  - Provide the patient education related to the signs and symptoms of the illness and desired effects of prescribed medications  Outcome: Progressing  Goal: Refrain from acting on delusional thinking/internal stimuli  Description  Interventions:  - Monitor patient closely, per order   - Utilize least restrictive measures   - Set reasonable limits, give positive feedback for acceptable   - Administer medications as ordered and monitor of potential side effects  Outcome: Progressing  Goal: Agree to be compliant with medication regime, as prescribed and report medication side effects  Description  Interventions:  - Offer appropriate PRN medication and supervise ingestion; conduct aims, as needed   Outcome: Progressing  Goal: Attend and participate in unit activities, including therapeutic, recreational, and educational groups  Description  Interventions:  - Provide therapeutic and educational activities daily, encourage attendance and participation, and document same in the medical record   Outcome: Progressing  Goal: Recognize dysfunctional thoughts, communicate reality-based thoughts at the time of discharge  Description  Interventions:  - Provide medication and psycho-education to assist patient in compliance and developing insight into his/her illness   Outcome: Progressing  Goal: Complete daily ADLs, including personal hygiene independently, as able  Description  Interventions:  - Observe, teach, and assist patient with ADLS  - Monitor and promote a balance of rest/activity, with adequate nutrition and elimination   Outcome: Progressing     Problem: Ineffective Coping  Goal: Identifies ineffective coping skills  Outcome: Progressing  Goal: Identifies healthy coping skills  Outcome: Progressing  Goal: Demonstrates healthy coping skills  Outcome: Progressing  Goal: Participates in unit activities  Description  Interventions:  - Provide therapeutic environment   - Provide required programming   - Redirect inappropriate behaviors   Outcome: Progressing  Goal: Patient/Family participate in treatment and DC plans  Description  Interventions:  - Provide therapeutic environment  Outcome: Progressing  Goal: Patient/Family verbalizes awareness of resources  Outcome: Progressing  Goal: Understands least restrictive measures  Description  Interventions:  - Utilize least restrictive behavior  Outcome: Progressing  Goal: Free from restraint events  Description  - Utilize least restrictive measures   - Provide behavioral interventions   - Redirect inappropriate behaviors   Outcome: Progressing     Problem: Nutrition/Hydration-ADULT  Goal: Nutrient/Hydration intake appropriate for improving, restoring or maintaining nutritional needs  Description  Monitor and assess patient's nutrition/hydration status for malnutrition (ex- brittle hair, bruises, dry skin, pale skin and conjunctiva, muscle wasting, smooth red tongue, and disorientation)  Collaborate with interdisciplinary team and initiate plan and interventions as ordered  Monitor patient's weight and dietary intake as ordered or per policy   Utilize nutrition screening tool and intervene per policy  Determine patient's food preferences and provide high-protein, high-caloric foods as appropriate       INTERVENTIONS:  - Monitor oral intake, urinary output, labs, and treatment plans  - Assess nutrition and hydration status and recommend course of action  - Evaluate amount of meals eaten  - Assist patient with eating if necessary   - Allow adequate time for meals  - Recommend/ encourage appropriate diets, oral nutritional supplements, and vitamin/mineral supplements  - Order, calculate, and assess calorie counts as needed  - Recommend, monitor, and adjust tube feedings and TPN/PPN based on assessed needs  - Assess need for intravenous fluids  - Provide specific nutrition/hydration education as appropriate  - Include patient/family/caregiver in decisions related to nutrition  Outcome: Progressing

## 2019-08-04 NOTE — PROGRESS NOTES
Patient present out in milieu social with select peers  Patient attends group  Patient denies SI, anxiety and depression  Patient denies hearing voices today  Patient is happy to be leaving tomorrow  Patient has x 1 loose stool before lunch  Monitoring continues  Patient ate a dinner roll only for lunch and is afraid to eat beacuase she might "shits my pants" Patient encouraged to drink fluids and the sausage gravy they had for breakfast might have made her stomach upset and stools loose  Patient resting in room tearful  Q 7 minute checks maintained  Monitoring continues

## 2019-08-04 NOTE — PROGRESS NOTES
Matty Bauer#  :1927 F  MHX:[id number]    TAE:[id number]  Adm Date: 2019  8:19 PM   ATT PHY: Colton Irasema, [facility] [facility] [address]         Subjective     The patient was seen after reviewing the chart and discussing the case with caring staff  Today during our encounter, the patient reported no acute medical concerns  Patient is a possible discharge for Monday  Patient with is requesting all her prescriptions  I reviewed and reconciled patient's problem list and medications  Objective     Vitals:    19 0700   BP: 152/88   Pulse: 68   Resp: 18   Temp: 97 8 °F (36 6 °C)   SpO2: 98%       General Appearance: Awake and Alert  No acute distress  HEENT: Normocephalic, atraumatic  PERRLA, EOMI, MMM  Heart: RRR, Early systolic murmur heard most prominently in the aortic region  Normal S1 and S2   Lungs: CTA bilaterally with fair air entry  Assessment     Tu Willson is a(n) 80y o  year old female with depression      MEDICAL CLEARANCE  Patient is medically cleared for discharge  All scripts have been written for the patient  1  Cardiac with history of Hypertension  Losartan  2  Hypothyroidism  Levothyroxine  3  GERD  Protonix  4  Seizure Disorder  Lamictal   5  Dementia  Namenda  6  DJD/OA  Tylenol as needed  7  Hyponatremia  I will reduce patient's fluid restriction to 1000 cc in 24 hours  Patient is on Sodium Chloride tablets 1g TID  8  Dizziness/lightheadedness  Patient may take Meclizine on as-needed basis  9  Vitamin-D deficiency  I will put the patient on vitamin-D bolus doses for 8 weeks followed by vitamin D3 1000 units daily  10  Vitamin B12 deficiency  Patient has been put on monthly B12 injections    Show room

## 2019-08-04 NOTE — PROGRESS NOTES
Progress Note - Branden Stewart 32 [address] 80 y o  female MRN: [id number]   Unit/Bed#[facility] 203-01 Encounter: [id number]    Behavior over the last 24 hours:    Ary Carlton was seen for an inpatient follow-up psychiatric visit this date  She has had no significant changes status over the past 24 hours and denies any suicidal or homicidal ideation at this time  Appetite and sleep are within normal limits  She offers no issues or concerns  ROS: no complaints    Mental Status Evaluation:    Appearance:  casually dressed, dressed appropriately   Behavior:  normal, pleasant, cooperative   Speech:  normal rate and volume   Mood:  normal   Affect:  normal range and intensity   Thought Process:  organized, logical, coherent   Associations: intact associations   Thought Content:  no overt delusions   Perceptual Disturbances: none   Risk Potential: Suicidal ideation - None  Homicidal ideation - None  Potential for aggression - No   Sensorium:  oriented to person, place and time/date   Memory:  recent and remote memory grossly intact   Consciousness:  alert and awake   Attention: attention span and concentration are age appropriate   Insight:  fair   Judgment: fair   Gait/Station: normal gait/station, normal balance   Motor Activity: no abnormal movements     Vital signs in last 24 hours:    Temp:  [97 8 °F (36 6 °C)-99 °F (37 2 °C)] 97 8 °F (36 6 °C)  HR:  [66-70] 68  Resp:  [16-18] 18  BP: (145-161)/(67-88) 152/88    Laboratory results:  I have personally reviewed all pertinent laboratory/tests results  Progress Toward Goals: progressing    Assessment/Plan   Principal Problem:    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    Recommended Treatment:   Continue current medications as prescribed  Continue to monitor  Discharge disposition and planning are ongoing  All current active medications have been reviewed    Encourage group therapy, milieu therapy and occupational therapy  [address] checks every 7 minutes      Current Facility-Administered Medications:  acetaminophen 650 mg Oral Q6H PRN Basil Mederos MD   acetaminophen 650 mg Oral Q4H PRN Basil Mederos, MD   acetaminophen 975 mg Oral Q6H PRN Franko Tucker MD   aluminum-magnesium hydroxide-simethicone 30 mL Oral Q4H PRN Basil Mederos MD   [START ON 9/11/2019] cholecalciferol 1,000 Units Oral Daily Adal Owusu MD   cyanocobalamin 1,000 mcg Intramuscular Q30 Days Adal Owusu MD   donepezil 5 mg Oral HS Tori Ohara MD   ergocalciferol 50,000 Units Oral Weekly Adal Owusu MD   haloperidol 1 mg Oral Q6H PRN Basil Mederos, MD   hydrOXYzine HCL 25 mg Oral Q6H PRN Tori Ohara MD   lamoTRIgine 75 mg Oral BID With Meals Marveen Sessions, MD   levothyroxine 50 mcg Oral Early Morning Marveen Sessions, MD   losartan 25 mg Oral Daily Marveen Sessions, MD   magnesium hydroxide 30 mL Oral Daily PRN Basil Mederos, MD   meclizine 12 5 mg Oral Q8H PRN Adal Owusu MD   memantine 5 mg Oral BID Marveen Sessions, MD   mirtazapine 7 5 mg Oral HS Tori Ohara MD   nystatin  Topical BID Adal Owusu MD   pantoprazole 40 mg Oral Early Morning Marveen Sessions, MD   perphenazine 4 mg Oral Daily Tori Ohara MD   perphenazine 8 mg Oral Daily Tori Ohara MD   QUEtiapine 25 mg Oral HS Tori Ohara MD   risperiDONE 1 mg Oral Q3H PRN Basil Mederos MD   sodium chloride 1 g Oral TID With Meals Mathieu John PA-C   traZODone 50 mg Oral HS PRN Basil Mederos, MD   ziprasidone 10 mg Intramuscular Q4H PRN Basil Mederos MD       Risks / Benefits of Treatment:    Risks, benefits, and possible side effects of medications explained to patient and patient verbalizes understanding and agreement for treatment  Counseling / Coordination of Care:      Patient's progress discussed with staff in treatment team meeting  Medications, treatment progress and treatment plan reviewed with patient

## 2019-08-04 NOTE — NURSING NOTE
[id number]  Pt found to be sleeping on most of authors rounds  No acute behavioral issues noted  Q 7 min checks maintained  Fall protocol in place

## 2019-08-04 NOTE — NURSING NOTE
Pt present in the milieu; Social with select peers  Affect and mood labile  Thought process disorganized  No acute behavioral issues noted  Q 7 min checks maintained

## 2019-08-05 VITALS
HEART RATE: 66 BPM | SYSTOLIC BLOOD PRESSURE: 142 MMHG | OXYGEN SATURATION: 98 % | HEIGHT: 59 IN | RESPIRATION RATE: 20 BRPM | DIASTOLIC BLOOD PRESSURE: 65 MMHG | BODY MASS INDEX: 25.52 KG/M2 | WEIGHT: 126.6 LBS | TEMPERATURE: 98.3 F

## 2019-08-05 PROBLEM — F03.90 DEMENTIA (HCC): Chronic | Status: ACTIVE | Noted: 2019-08-05

## 2019-08-05 PROCEDURE — 99239 HOSP IP/OBS DSCHRG MGMT >30: CPT | Performed by: NURSE PRACTITIONER

## 2019-08-05 RX ORDER — PERPHENAZINE 8 MG
8 TABLET ORAL DAILY
Qty: 30 TABLET | Refills: 0 | Status: SHIPPED | OUTPATIENT
Start: 2019-08-05 | End: 2019-08-26 | Stop reason: SDUPTHER

## 2019-08-05 RX ORDER — MIRTAZAPINE 7.5 MG/1
7.5 TABLET, FILM COATED ORAL
Qty: 30 TABLET | Refills: 0 | Status: SHIPPED | OUTPATIENT
Start: 2019-08-05 | End: 2019-08-26 | Stop reason: SDUPTHER

## 2019-08-05 RX ORDER — QUETIAPINE FUMARATE 25 MG/1
25 TABLET, FILM COATED ORAL
Qty: 30 TABLET | Refills: 0 | Status: SHIPPED | OUTPATIENT
Start: 2019-08-05 | End: 2019-08-26 | Stop reason: SDUPTHER

## 2019-08-05 RX ORDER — DONEPEZIL HYDROCHLORIDE 5 MG/1
5 TABLET, FILM COATED ORAL
Qty: 30 TABLET | Refills: 0 | Status: SHIPPED | OUTPATIENT
Start: 2019-08-05 | End: 2019-08-26 | Stop reason: SDUPTHER

## 2019-08-05 RX ORDER — PERPHENAZINE 4 MG/1
4 TABLET, FILM COATED ORAL DAILY
Qty: 30 TABLET | Refills: 0 | Status: SHIPPED | OUTPATIENT
Start: 2019-08-06 | End: 2019-08-26 | Stop reason: SDUPTHER

## 2019-08-05 RX ORDER — MEMANTINE HYDROCHLORIDE 5 MG/1
5 TABLET ORAL 2 TIMES DAILY
Qty: 60 TABLET | Refills: 0 | Status: SHIPPED | OUTPATIENT
Start: 2019-08-05 | End: 2019-08-26 | Stop reason: SDUPTHER

## 2019-08-05 RX ADMIN — MEMANTINE 5 MG: 5 TABLET ORAL at 08:18

## 2019-08-05 RX ADMIN — LOSARTAN POTASSIUM 25 MG: 50 TABLET, FILM COATED ORAL at 08:18

## 2019-08-05 RX ADMIN — PERPHENAZINE 4 MG: 4 TABLET, FILM COATED ORAL at 08:19

## 2019-08-05 RX ADMIN — NYSTATIN 1 APPLICATION: 100000 POWDER TOPICAL at 08:20

## 2019-08-05 RX ADMIN — LAMOTRIGINE 75 MG: 25 TABLET ORAL at 08:18

## 2019-08-05 RX ADMIN — SODIUM CHLORIDE TAB 1 GM 1 G: 1 TAB at 08:18

## 2019-08-05 RX ADMIN — LEVOTHYROXINE SODIUM 50 MCG: 50 TABLET ORAL at 06:17

## 2019-08-05 RX ADMIN — SODIUM CHLORIDE TAB 1 GM 1 G: 1 TAB at 12:11

## 2019-08-05 RX ADMIN — PANTOPRAZOLE SODIUM 40 MG: 40 TABLET, DELAYED RELEASE ORAL at 06:17

## 2019-08-05 NOTE — PROGRESS NOTES
Pt denies any suicidal or homicidal ideations  Q 7 min checks maintained to monitor pt's behavior & safety  Pt up ad chandrika with walker  Pt to be discharged home today

## 2019-08-05 NOTE — NURSING NOTE
Pt's daughter instructed on discharge instructions & medications; & verbalized understanding of instructions  Pt discharged home via main entrance of hospital via ambulation accompanied by daughter & NA  Belongings sent with pt

## 2019-08-05 NOTE — NURSING NOTE
Pt present in the milieu affect bright on approach Mood is labile  Pleasant thuris evening no acute behavioral issues noted  Q 7 min checks maintained  /

## 2019-08-05 NOTE — PROGRESS NOTES
Matty Bauer#  :[id number] F  RGQ:[id number]    IMK:[id number]  Adm Date: 2019  8:19 PM   ATT PHY:   [id number]         Subjective     The patient was seen after reviewing the chart and discussing the case with caring staff  Today during our encounter, the patient reported no acute medical concerns  Patient is scheduled for discharge today  Patient with is requesting all her prescriptions  I reviewed and reconciled patient's problem list and medications  Objective     Vitals:    19 0721   BP: 142/65   Pulse: 66   Resp: 20   Temp: 98 3 °F (36 8 °C)   SpO2: 98%       General Appearance: Awake and Alert  No acute distress  HEENT: Normocephalic, atraumatic  PERRLA, EOMI, MMM  Heart: RRR, Early systolic murmur heard most prominently in the aortic region  Normal S1 and S2   Lungs: CTA bilaterally with fair air entry  Assessment     Funmi Collado is a(n) 80y o  year old female with depression      MEDICAL CLEARANCE  Patient is medically cleared for discharge  All scripts have been written for the patient  1  Cardiac with history of Hypertension  Losartan  2  Hypothyroidism  Levothyroxine  3  GERD  Protonix  4  Seizure Disorder  Lamictal   5  Dementia  Namenda  6  DJD/OA  Tylenol as needed  7  Hyponatremia  I will reduce patient's fluid restriction to 1000 cc in 24 hours  Patient is on Sodium Chloride tablets 1g TID  8  Dizziness/lightheadedness  Patient may take Meclizine on as-needed basis  9  Vitamin-D deficiency  I will put the patient on vitamin-D bolus doses for 8 weeks followed by vitamin D3 1000 units daily  10  Vitamin B12 deficiency  Patient has been put on monthly B12 injections    Show room

## 2019-08-05 NOTE — NURSING NOTE
[phone]  Pt found to be sleeping on most of authors rounds  No acute behavioral issues noted  Q 7 min checks maintained  Fall protocol in place

## 2019-08-05 NOTE — BH TRANSITION RECORD
Contact Information: If you have any questions, concerns, pended studies, tests and/or procedures, or emergencies regarding your inpatient behavioral health visit  Please contact [facility] older adult behavioral health unit [phone] and ask to speak to a , nurse or physician  A contact is available 24 hours/ 7 days a week at this number  Summary of Procedures Performed During your Stay:  Below is a list of major procedures performed during your hospital stay and a summary of results:  - Major Imaging Studies: CT of the head showed no acute issues, but age-related atrophy  Pending Studies (From admission, onward)    None        If studies are pending at discharge, follow up with your PCP and/or referring provider

## 2019-08-05 NOTE — DISCHARGE SUMMARY
Discharge Summary - Tristin Bauer 80 y o  female MRN: [id number]  Unit/Bed#: [facility] 423-55 Encounter: [id number]     Admission Date: 7/19/2019         Discharge Date: 8/5/2019  3:00 PM    Attending Psychiatrist: Dr Liliana Goss    Reason for Admission/HPI: Depression [F32 9]     According to admission report from Dr Familia Kennedy:    Chief Complaint: "I don't want to go on living like this"       History of Present Illness     Patient is a 80 y o  female presents with no previous documented psychiatric hx of in-pt-treatment who was admitted with altered mental status  Pt reported to endorsed a worsening in her mood related symptoms as well her anxiety related symptoms  Pt was seen by the bedside ,not a very good historian  She was tearful during the evaluation stating that she needed help and did not want to go on living like this  Pt appeared to be confused with on-going cognitive deficits-short term more profoundly affected  She admitted to a low mood with anhedonia and despair,she however denied suicidal ideation intent or plan  Lot os anxiety related symptoms some agitation and disinhibition  Pt unable to elaborate much more on the psychosis,however as per the family ,they report on going visual and auditory hallucinations in addition to the pt having conversations with people that are not there  Pt is also reported to have endorsed hearing peoples voice and various musical pieces going off in her head repeatedly  There is a premorbid hx of memory issues     Patient was admitted to psychiatric unit on a Voluntary basis  Hospital Course: The patient was admitted to the inpatient psychiatric unit and started on every 7 minutes precautions  During the hospitalization the patient was attending individual therapy, group therapy, milieu therapy and occupational therapy  Psychiatric medications were titrated over the hospital stay   To address depressive symptoms and psychotic symptoms the patient was started on antidepressant Remeron and antipsychotic medication Trilafon  Medication doses were titrated during the hospital course  Prior to beginning of treatment medications risks and benefits and possible side effects including risk of parkinsonian symptoms, Tardive Dyskinesia and metabolic syndrome related to treatment with antipsychotic medications and risk of cardiovascular events in elderly related to treatment with antipsychotic medications were reviewed with the patient  The patient verbalized understanding and agreement for treatment  Patient medications titrated conservatively given age and decreased kidney function  Patient's symptoms improved gradually over the hospital course  At the end of treatment the patient was doing well  Mood was stable at the time of discharge  The patient denied suicidal ideation, intent or plan at the time of discharge and denied homicidal ideation, intent or plan at the time of discharge  There was no overt psychosis at the time of discharge  Sleep and appetite were improved  The patient was tolerating medications and was not reporting any significant side effects at the time of discharge  Since the patient was doing well at the end of the hospitalization, treatment team felt that the patient could be safely discharged to outpatient care  Since Mirtha Choi was doing well at the end of the hospitalization, treatment team felt that she could be safely discharged to outpatient care  The outpatient follow up with a psychiatrist was arranged by the unit  upon discharge      Mental Status at time of Discharge:     Appearance:  age appropriate   Behavior:  normal   Speech:  normal pitch and normal volume   Mood:  anxious   Affect:  flat   Thought Process:  concrete   Thought Content:  normal   Perceptual Disturbances: None   Risk Potential: Suicidal Ideations none, Homicidal Ideations none and Potential for Aggression No   Sensorium:  person and place   Cognition: impaired due to dementia   Consciousness:  alert and awake    Attention: attention span and concentration were age appropriate   Insight:  age appropriate   Judgment: age appropriate   Gait/Station: uses walker   Motor Activity: no abnormal movements     Admission Diagnosis:Depression [F32 9]    Discharge Diagnosis:   Principal Problem:    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  Active Problems:    Dementia  Resolved Problems:    * No resolved hospital problems   *        Lab results:  Admission on 07/19/2019, Discharged on 08/05/2019   Component Date Value    Sodium 07/20/2019 129*    Potassium 07/20/2019 4 1     Chloride 07/20/2019 96*    CO2 07/20/2019 26     ANION GAP 07/20/2019 7     BUN 07/20/2019 12     Creatinine 07/20/2019 0 94     Glucose 07/20/2019 100*    Glucose, Fasting 07/20/2019 100*    Calcium 07/20/2019 9 2     eGFR 07/20/2019 53     Sodium 07/22/2019 129*    Potassium 07/22/2019 5 0     Chloride 07/22/2019 96*    CO2 07/22/2019 28     ANION GAP 07/22/2019 5     BUN 07/22/2019 23     Creatinine 07/22/2019 0 98     Glucose 07/22/2019 101*    Glucose, Fasting 07/22/2019 101*    Calcium 07/22/2019 9 1     eGFR 07/22/2019 51     Vit D, 25-Hydroxy 07/23/2019 18 6*    Vitamin B-12 07/23/2019 428     Sodium 07/24/2019 129*    Potassium 07/24/2019 4 8     Chloride 07/24/2019 97*    CO2 07/24/2019 28     ANION GAP 07/24/2019 4     BUN 07/24/2019 20     Creatinine 07/24/2019 1 06     Glucose 07/24/2019 93     Calcium 07/24/2019 9 1     eGFR 07/24/2019 46     Sodium 07/26/2019 133*    Potassium 07/26/2019 4 5     Chloride 07/26/2019 100     CO2 07/26/2019 28     ANION GAP 07/26/2019 5     BUN 07/26/2019 20     Creatinine 07/26/2019 0 88     Glucose 07/26/2019 86     Glucose, Fasting 07/26/2019 86     Calcium 07/26/2019 8 9     AST 07/26/2019 16     ALT 07/26/2019 11     Alkaline Phosphatase 07/26/2019 54*    Total Protein 07/26/2019 6 6     Albumin 07/26/2019 3 9     Total Bilirubin 07/26/2019 0 40     eGFR 07/26/2019 58     Sodium 07/30/2019 134     Potassium 07/30/2019 4 6     Chloride 07/30/2019 99     CO2 07/30/2019 31     ANION GAP 07/30/2019 4     BUN 07/30/2019 17     Creatinine 07/30/2019 1 15     Glucose 07/30/2019 82     Calcium 07/30/2019 9 3     eGFR 07/30/2019 42        Discharge Medications:  Discharge Medication List as of 8/5/2019  1:03 PM      START taking these medications    Details   cyanocobalamin 1,000 mcg/mL Inject 1 mL (1,000 mcg total) into a muscle every 30 (thirty) days, Starting Fri 8/23/2019, Print      donepezil (ARICEPT) 5 mg tablet Take 1 tablet (5 mg total) by mouth daily at bedtime, Starting Mon 8/5/2019, Print      ergocalciferol (VITAMIN D2) 50,000 units Take 1 capsule (50,000 Units total) by mouth once a week for 6 doses, Starting Wed 8/7/2019, Until Thu 9/12/2019, Print      mirtazapine (REMERON) 7 5 MG tablet Take 1 tablet (7 5 mg total) by mouth daily at bedtime, Starting Mon 8/5/2019, Print      nystatin (MYCOSTATIN) powder Apply topically 2 (two) times a day for 30 days, Starting Sat 8/3/2019, Until Mon 9/2/2019, Print      pantoprazole (PROTONIX) 40 mg tablet Take 1 tablet (40 mg total) by mouth daily in the early morning for 30 days, Starting Sun 8/4/2019, Until Tue 9/3/2019, Print      ! ! perphenazine 4 mg tablet Take 1 tablet (4 mg total) by mouth daily In AM, Starting Tue 8/6/2019, Print      ! ! perphenazine 8 mg tablet Take 1 tablet (8 mg total) by mouth daily After suppertime, 6 PM, Starting Mon 8/5/2019, Print      QUEtiapine (SEROquel) 25 mg tablet Take 1 tablet (25 mg total) by mouth daily at bedtime, Starting Mon 8/5/2019, Print      sodium chloride 1 g tablet Take 1 tablet (1 g total) by mouth 3 (three) times a day with meals for 30 days, Starting Sat 8/3/2019, Until Mon 9/2/2019, Print       !! - Potential duplicate medications found   Please discuss with provider  Discharge Medication List as of 8/5/2019  1:03 PM      STOP taking these medications       ALPRAZolam (NIRAVAM) 0 25 MG dissolvable tablet Comments:   Reason for Stopping:         lamoTRIgine (LaMICtal) 25 mg tablet Comments:   Reason for Stopping:         omeprazole (PriLOSEC) 20 mg delayed release capsule Comments:   Reason for Stopping:         sertraline (ZOLOFT) 25 mg tablet Comments:   Reason for Stopping:              Discharge Medication List as of 8/5/2019  1:03 PM      CONTINUE these medications which have CHANGED    Details   levothyroxine 50 mcg tablet Take 1 tablet (50 mcg total) by mouth daily in the early morning for 30 days, Starting Sat 8/3/2019, Until Mon 9/2/2019, Print      losartan (COZAAR) 25 mg tablet Take 1 tablet (25 mg total) by mouth daily for 30 days, Starting Sat 8/3/2019, Until Mon 9/2/2019, Print      meclizine (ANTIVERT) 12 5 MG tablet Take 1 tablet (12 5 mg total) by mouth every 8 (eight) hours as needed for dizziness for up to 30 days, Starting Sat 8/3/2019, Until Mon 9/2/2019, Print      memantine (NAMENDA) 5 mg tablet Take 1 tablet (5 mg total) by mouth 2 (two) times a day, Starting Mon 8/5/2019, Print      rivaroxaban (XARELTO) 15 mg tablet Take 1 tablet (15 mg total) by mouth every evening for 30 days, Starting Sat 8/3/2019, Until Mon 9/2/2019, Print      simvastatin (ZOCOR) 20 mg tablet Take 1 tablet (20 mg total) by mouth daily after dinner for 30 days, Starting Sat 8/3/2019, Until Mon 9/2/2019, Print            Discharge Medication List as of 8/5/2019  1:03 PM           Discharge instructions/Information to patient and family:   See after visit summary for information provided to patient and family  Provisions for Follow-Up Care:  See after visit summary for information related to follow-up care and any pertinent home health orders  Discharge Statement     I spent 35 minutes discharging the patient  This time was spent on the day of discharge   I had direct contact with the patient on the day of discharge  Additional documentation is required if more than 30 minutes were spent on discharge:    I reviewed with Rere importance of compliance with medications and outpatient treatment after discharge  I discussed the medication regimen and possible side effects of the medications with Rere prior to discharge  At the time of discharge she was tolerating psychiatric medications  I discussed outpatient follow up with Samir Schmitt  I reviewed with Samir Schmitt crisis plan and safety plan upon discharge

## 2019-08-05 NOTE — PROGRESS NOTES
Daily Rounds Documentation:     Team Members present:   MD Korin Friend, MAURICE Salamanca, RN  Sofia Avila, ISISW  Leta Mccloud, MICHEL Donaldson, CTRS     D/C home today with daughter

## 2019-08-19 ENCOUNTER — OFFICE VISIT (OUTPATIENT)
Dept: PSYCHIATRY | Facility: CLINIC | Age: 84
End: 2019-08-19
Payer: MEDICARE

## 2019-08-19 DIAGNOSIS — F03.90 DEMENTIA WITHOUT BEHAVIORAL DISTURBANCE, UNSPECIFIED DEMENTIA TYPE (HCC): Primary | Chronic | ICD-10-CM

## 2019-08-19 DIAGNOSIS — F06.32 DEPRESSIVE DISORDER DUE TO ANOTHER MEDICAL CONDITION WITH MAJOR DEPRESSIVE-LIKE EPISODE: ICD-10-CM

## 2019-08-19 PROCEDURE — 90792 PSYCH DIAG EVAL W/MED SRVCS: CPT | Performed by: NURSE PRACTITIONER

## 2019-08-19 NOTE — PSYCH
Outpatient Psychiatry Intake Exam       PCP: Jonatan Castaneda MD    Referral source: [facility]    Identifying information:  Leti Hamilton is a 80 y o  female with a history of  here for evaluation and determination of mental health management needs  Sources of information:   Information for this evaluation was gathered through direct interview with the patient, as well as with her son  Confidentiality discussion: Limits of confidentiality in cases of safety concerns involving self and others as well as this physician's role as a mandatory  of abuse  They voiced understanding and a desire to continue with the evaluation  SUBJECTIVE     Chief Complaint / reason for visit: " I was in the hospital a couple weeks ago and they told me to come see you"  History of Present Illness:  Patient is a 80year old female recently discharged from [address] [place] [address]  Per ER note from the hospital: Patient is a 89 y  o  female presents with no previous documented psychiatric hx of in-pt-treatment who was admitted with altered mental status  Pt reported to endorsed a worsening in her mood related symptoms as well her anxiety related symptoms  Pt was seen by the bedside ,not a very good historian  She was tearful during the evaluation stating that she needed help and did not want to go on living like this  Pt appeared to be confused with on-going cognitive deficits-short term more profoundly affected  She admitted to a low mood with anhedonia and despair,she however denied suicidal ideation intent or plan  Lot os anxiety related symptoms some agitation and disinhibition  Pt unable to elaborate much more on the psychosis,however as per the family ,they report on going visual and auditory hallucinations in addition to the pt having conversations with people that are not there  Pt is also reported to have endorsed hearing peoples voice and various musical pieces going off in her head repeatedly   There is a premorbid hx of memory issues  Patient was admitted to psychiatric unit on a Voluntary basis  Rere's main complaint today is dizziness  She denies depression, anxiety, hallucinations, paranoia  Her son is present and states she is doing quite well; he states she will hear a voice every so often though is able to be redirected, as prior to her hospitalization she would ruminate and obsess, as well as confabulate  Today she is alert and oriented x 3 and overall doing very well psychiatrically  Stressors: hearing difficulty    HPI ROS:  Medication Side Effects: none  Depression: 0/10 (10 worst)  Anxiety: 0/10 (10 worst)  Safety (SI, HI, other): denied  Sleep: adequate "much better since the hospital"  Energy: adequate  Appetite: increased, much healthier, as prior to hospitalization she was not interested in eating  Weight Change: increased    In terms of depression, the patient denies symptoms  In terms of dave, the patient endorses no  Symptoms include no symptoms    SAMMY symptoms: no symptoms suggestive of SAMMY  Panic Disorder symptoms: no symptoms suggestive of panic disorder  Social Anxiety symptoms: no symptoms suggestive of social anxiety  OCD Symptoms: No significant symptoms supportive of OCD  Eating Disorder symptoms: no historical or current eating disorder  In terms of PTSD, the patient denies exposure to trauma  In terms of psychotic symptoms, the patient reports No psychotic symptoms now but history of  This included both auditory and visual hallucinations, paranoia, insomnia      Past Psychiatric History  Previous diagnoses include depression  Prior outpatient psychiatric treatment: Had seen a psychiatrist in [place] [address] prior to recent hospitalization  Prior therapy: none  Prior inpatient psychiatric treatment: 7/19/19-8/5/19-  [address]   Prior suicide attempts: none  Prior self harm: none  Prior violence or aggression: none    Previous psychotropic medication trials:   Trilafon, Remeron, Karol      Social History:    The patient grew up in [place], [place]  Childhood was described as "a good childhood"  During childhood, parents were   They have three sisters and 6 brothers  Abuse/neglect: none    As far as the patient (or present family member) is aware, overall childhood development: Patient does ascribe to normal developmental milestones such as walking, talking, potty training and making childhood friends  Education level: high school then worked in a [address]  and at [employer])   Current occupation: retired  Marital status:   Children: 4 children  Current Living Situation: the patient currently lives with her daughter in [facility]  Social support: family    Mosque Affiliation: yes (undisclosed), had been in the [employer]   experience: none  Legal history: none  Access to Guns: none    Substance use and treatment:  Tobacco use: none  Caffeine Use: coffee daily  ETOH use: none  Other substance use: none   Endorses previous experimentation with: none    Longest clean time: not applicable  History of Inpatient/Outpatient rehabilitation program: no      Traumatic History:     Abuse: none  Other Traumatic Events: none     Family Psychiatric History:     Psychiatric Illness:  no family history of psychiatric illness, no family history of substance abuse, no family history of suicide attempts    No family history on file           Past Medical / Surgical History:    Current PCP: Zeenat Tinajero MD   Other providers include:     Patient Active Problem List   Diagnosis    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    Dementia       Past Medical History-  Past Medical History:   Diagnosis Date    Anxiety     Cardiac disease     Dementia     Depression     GERD (gastroesophageal reflux disease)     Hypertension     Hypothyroidism     [facility] [facility]     [facility]           History of Seizures: yes  History of Head injury-LOC-Concussion: no    Past Surgical History-  No past surgical history on file  Allergies: Allergies   Allergen Reactions    Zyprexa [Olanzapine] Seizures       Recent labs:   Admission on 07/19/2019, Discharged on 08/05/2019   Component Date Value    Sodium 07/20/2019 129*    Potassium 07/20/2019 4 1     Chloride 07/20/2019 96*    CO2 07/20/2019 26     ANION GAP 07/20/2019 7     BUN 07/20/2019 12     Creatinine 07/20/2019 0 94     Glucose 07/20/2019 100*    Glucose, Fasting 07/20/2019 100*    Calcium 07/20/2019 9 2     eGFR 07/20/2019 53     Sodium 07/22/2019 129*    Potassium 07/22/2019 5 0     Chloride 07/22/2019 96*    CO2 07/22/2019 28     ANION GAP 07/22/2019 5     BUN 07/22/2019 23     Creatinine 07/22/2019 0 98     Glucose 07/22/2019 101*    Glucose, Fasting 07/22/2019 101*    Calcium 07/22/2019 9 1     eGFR 07/22/2019 51     Vit D, 25-Hydroxy 07/23/2019 18 6*    Vitamin B-12 07/23/2019 428     Sodium 07/24/2019 129*    Potassium 07/24/2019 4 8     Chloride 07/24/2019 97*    CO2 07/24/2019 28     ANION GAP 07/24/2019 4     BUN 07/24/2019 20     Creatinine 07/24/2019 1 06     Glucose 07/24/2019 93     Calcium 07/24/2019 9 1     eGFR 07/24/2019 46     Sodium 07/26/2019 133*    Potassium 07/26/2019 4 5     Chloride 07/26/2019 100     CO2 07/26/2019 28     ANION GAP 07/26/2019 5     BUN 07/26/2019 20     Creatinine 07/26/2019 0 88     Glucose 07/26/2019 86     Glucose, Fasting 07/26/2019 86     Calcium 07/26/2019 8 9     AST 07/26/2019 16     ALT 07/26/2019 11     Alkaline Phosphatase 07/26/2019 54*    Total Protein 07/26/2019 6 6     Albumin 07/26/2019 3 9     Total Bilirubin 07/26/2019 0 40     eGFR 07/26/2019 58     Sodium 07/30/2019 134     Potassium 07/30/2019 4 6     Chloride 07/30/2019 99     CO2 07/30/2019 31     ANION GAP 07/30/2019 4     BUN 07/30/2019 17     Creatinine 07/30/2019 1 15     Glucose 07/30/2019 82     Calcium 07/30/2019 9 3     eGFR 07/30/2019 42      Labs were reviewed    Medical Review Of Systems:     Pertinent items are noted in HPI        Medications:  Current Outpatient Medications on File Prior to Visit   Medication Sig Dispense Refill    [START ON 8/23/2019] cyanocobalamin 1,000 mcg/mL Inject 1 mL (1,000 mcg total) into a muscle every 30 (thirty) days 1 mL 0    donepezil (ARICEPT) 5 mg tablet Take 1 tablet (5 mg total) by mouth daily at bedtime 30 tablet 0    ergocalciferol (VITAMIN D2) 50,000 units Take 1 capsule (50,000 Units total) by mouth once a week for 6 doses 5 capsule 0    levothyroxine 50 mcg tablet Take 1 tablet (50 mcg total) by mouth daily in the early morning for 30 days 30 tablet 0    losartan (COZAAR) 25 mg tablet Take 1 tablet (25 mg total) by mouth daily for 30 days 30 tablet 0    meclizine (ANTIVERT) 12 5 MG tablet Take 1 tablet (12 5 mg total) by mouth every 8 (eight) hours as needed for dizziness for up to 30 days 30 tablet 0    memantine (NAMENDA) 5 mg tablet Take 1 tablet (5 mg total) by mouth 2 (two) times a day 60 tablet 0    mirtazapine (REMERON) 7 5 MG tablet Take 1 tablet (7 5 mg total) by mouth daily at bedtime 30 tablet 0    nystatin (MYCOSTATIN) powder Apply topically 2 (two) times a day for 30 days 15 g 1    pantoprazole (PROTONIX) 40 mg tablet Take 1 tablet (40 mg total) by mouth daily in the early morning for 30 days 30 tablet 0    perphenazine 4 mg tablet Take 1 tablet (4 mg total) by mouth daily In AM 30 tablet 0    perphenazine 8 mg tablet Take 1 tablet (8 mg total) by mouth daily After suppertime, 6 PM 30 tablet 0    QUEtiapine (SEROquel) 25 mg tablet Take 1 tablet (25 mg total) by mouth daily at bedtime 30 tablet 0    rivaroxaban (XARELTO) 15 mg tablet Take 1 tablet (15 mg total) by mouth every evening for 30 days 30 tablet 0    simvastatin (ZOCOR) 20 mg tablet Take 1 tablet (20 mg total) by mouth daily after dinner for 30 days 30 tablet 0    sodium chloride 1 g tablet Take 1 tablet (1 g total) by mouth 3 (three) times a day with meals for 30 days 90 tablet 0     No current facility-administered medications on file prior to visit  Medication Compliant? yes    All current active medications have been reviewed  Medications prior to admission:    Cannot display prior to admission medications because the patient has not been admitted in this contact  Objective     OBJECTIVE     There were no vitals taken for this visit  MENTAL STATUS EXAM  Appearance:  age appropriate, dressed casually   Behavior:  pleasant, cooperative, with good eye contact   Speech:  Normal volume, regular rate and rhythm   Mood:  euthymic   Affect:  mood congruent   Language: intact and appropriate for age   Thought Process:  Linear and goal directed   Associations: intact associations   Thought Content:  normal and appropriate   Perceptual Disturbances: no auditory or visual hallcunations   Risk Potential / Abnormal Thoughts: Suicidal ideation - None  Homicidal ideation - None  Potential for aggression - No       Consciousness:  Alert & Awake   Sensorium:  Grossly oriented   Attention: attention span and concentration are age appropriate   Intellect: within normal limits   Fund of Knowledge:  Memory: awareness of current events: yes  recent and remote memory grossly intact   Insight:  good   Judgment: good   Muscle Strength Muscle Tone: normal  normal   Gait/Station: normal gait/station with good balance   Motor Activity: no abnormal movements     Pain none   Pain Scale 0     IMPRESSIONS/FORMULATION          Diagnoses and all orders for this visit:    Dementia without behavioral disturbance, unspecified dementia type    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        1  Dementia without behavioral disturbance, unspecified dementia type    2   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        Confidential Assessment:  Patient is grossly oriented though at times struggles with her memory (per patient and her son)  Hallucinations have been minimal with her current medication regimen and both patient and son state that she has had bouts of dizziness throughout her life  Antivert does help  Both patient and son agree that the current medications are very helpful as she can function overall well  Will continue and follow up in six weeks  Scales:  Likert:   Depression: 0/10  Anxiety: 0/10    Tu Willson is a 80 y o  female who presents with symptoms supporting the aforementioned  Suicide / Homicide / Safety risk assessment: Safety risk low overall upon consideration of protective and risk factors  Plan:       Education about diagnosis and treatment modalities, patient voiced understanding and agreement with the following plan:    Discussed medication risks, beneftis, alternatives  Patient was informed and had time to ask questions  They agreed to treatment below    Controlled Medication Discussion:     Not applicable    Initial treatment plan:   1) MEDS:    Continue Trilafon 4 mg AM, 8 mg HS  Seroquel 25 mg HS  Remeron 7 5 mg HS  Namenda 5 mg BID  Aricept 5 mg at HS  2) Labs: none needed  3) Therapy: n/a    4) Medical:    - Pt will f/u with other providers as needed  Has PCP appointment in September, an upcoming Neurology appointment for recent hx of seizures, audiology appointment for her hearing aid  5) Other: Support as needed    6) Follow up:   - Follow up in 6 weeks  - Patient will call if issues or concerns     7) Treatment Plan:    - Enacted     Discussed self monitoring of symptoms, and symptom monitoring tools  Patient has been informed of 24 hours and weekend coverage for urgent situations accessed by calling the main clinic phone number or calling 911 or getting to ED for immediate medical attention or suicidality        Bradford Price Chris, [facility]  8/19/2019

## 2019-08-26 DIAGNOSIS — E87.1 HYPONATREMIA: ICD-10-CM

## 2019-08-26 DIAGNOSIS — K21.9 GERD (GASTROESOPHAGEAL REFLUX DISEASE): ICD-10-CM

## 2019-08-26 DIAGNOSIS — E78.5 DYSLIPIDEMIA: ICD-10-CM

## 2019-08-26 DIAGNOSIS — E53.8 VITAMIN B12 DEFICIENCY: ICD-10-CM

## 2019-08-26 DIAGNOSIS — I10 HYPERTENSION: ICD-10-CM

## 2019-08-26 DIAGNOSIS — I25.10 CORONARY ARTERY DISEASE: ICD-10-CM

## 2019-08-26 DIAGNOSIS — R42 DIZZINESS: ICD-10-CM

## 2019-08-26 DIAGNOSIS — E03.9 HYPOTHYROIDISM: ICD-10-CM

## 2019-08-26 DIAGNOSIS — F06.32 DEPRESSIVE DISORDER DUE TO ANOTHER MEDICAL CONDITION WITH MAJOR DEPRESSIVE-LIKE EPISODE: ICD-10-CM

## 2019-08-26 RX ORDER — PANTOPRAZOLE SODIUM 40 MG/1
40 TABLET, DELAYED RELEASE ORAL
Qty: 14 TABLET | Refills: 0 | Status: SHIPPED | OUTPATIENT
Start: 2019-08-26

## 2019-08-26 RX ORDER — SODIUM CHLORIDE 1000 MG
1 TABLET, SOLUBLE MISCELLANEOUS
Qty: 42 TABLET | Refills: 0 | Status: SHIPPED | OUTPATIENT
Start: 2019-08-26 | End: 2019-09-30

## 2019-08-26 RX ORDER — MECLIZINE HCL 12.5 MG/1
12.5 TABLET ORAL EVERY 8 HOURS PRN
Qty: 42 TABLET | Refills: 0 | Status: SHIPPED | OUTPATIENT
Start: 2019-08-26

## 2019-08-26 RX ORDER — DONEPEZIL HYDROCHLORIDE 5 MG/1
5 TABLET, FILM COATED ORAL
Qty: 30 TABLET | Refills: 1 | Status: SHIPPED | OUTPATIENT
Start: 2019-08-26 | End: 2019-09-30 | Stop reason: SDUPTHER

## 2019-08-26 RX ORDER — MEMANTINE HYDROCHLORIDE 5 MG/1
5 TABLET ORAL 2 TIMES DAILY
Qty: 60 TABLET | Refills: 1 | Status: SHIPPED | OUTPATIENT
Start: 2019-08-26 | End: 2019-09-30 | Stop reason: SDUPTHER

## 2019-08-26 RX ORDER — LEVOTHYROXINE SODIUM 0.05 MG/1
50 TABLET ORAL
Qty: 14 TABLET | Refills: 0 | Status: SHIPPED | OUTPATIENT
Start: 2019-08-26

## 2019-08-26 RX ORDER — MIRTAZAPINE 7.5 MG/1
7.5 TABLET, FILM COATED ORAL
Qty: 30 TABLET | Refills: 1 | Status: SHIPPED | OUTPATIENT
Start: 2019-08-26 | End: 2019-09-30 | Stop reason: SDUPTHER

## 2019-08-26 RX ORDER — CYANOCOBALAMIN 1000 UG/ML
1000 INJECTION INTRAMUSCULAR; SUBCUTANEOUS
Qty: 1 ML | Refills: 0 | Status: SHIPPED | OUTPATIENT
Start: 2019-08-26

## 2019-08-26 RX ORDER — PERPHENAZINE 4 MG/1
4 TABLET, FILM COATED ORAL DAILY
Qty: 30 TABLET | Refills: 1 | Status: SHIPPED | OUTPATIENT
Start: 2019-08-26 | End: 2019-09-30

## 2019-08-26 RX ORDER — LOSARTAN POTASSIUM 25 MG/1
25 TABLET ORAL DAILY
Qty: 14 TABLET | Refills: 0 | Status: SHIPPED | OUTPATIENT
Start: 2019-08-26

## 2019-08-26 RX ORDER — PERPHENAZINE 8 MG
8 TABLET ORAL DAILY
Qty: 30 TABLET | Refills: 1 | Status: SHIPPED | OUTPATIENT
Start: 2019-08-26 | End: 2019-09-30

## 2019-08-26 RX ORDER — QUETIAPINE FUMARATE 25 MG/1
25 TABLET, FILM COATED ORAL
Qty: 30 TABLET | Refills: 1 | Status: SHIPPED | OUTPATIENT
Start: 2019-08-26 | End: 2019-09-30 | Stop reason: SDUPTHER

## 2019-08-26 RX ORDER — SIMVASTATIN 20 MG
20 TABLET ORAL
Qty: 14 TABLET | Refills: 0 | Status: SHIPPED | OUTPATIENT
Start: 2019-08-26

## 2019-09-30 ENCOUNTER — OFFICE VISIT (OUTPATIENT)
Dept: PSYCHIATRY | Facility: CLINIC | Age: 84
End: 2019-09-30
Payer: MEDICARE

## 2019-09-30 DIAGNOSIS — F06.32 DEPRESSIVE DISORDER DUE TO ANOTHER MEDICAL CONDITION WITH MAJOR DEPRESSIVE-LIKE EPISODE: ICD-10-CM

## 2019-09-30 DIAGNOSIS — F03.90 DEMENTIA WITHOUT BEHAVIORAL DISTURBANCE, UNSPECIFIED DEMENTIA TYPE (HCC): Primary | Chronic | ICD-10-CM

## 2019-09-30 PROCEDURE — 99214 OFFICE O/P EST MOD 30 MIN: CPT | Performed by: NURSE PRACTITIONER

## 2019-09-30 RX ORDER — PERPHENAZINE 4 MG/1
4 TABLET, FILM COATED ORAL 2 TIMES DAILY
Qty: 30 TABLET | Refills: 1 | Status: SHIPPED | OUTPATIENT
Start: 2019-09-30 | End: 2019-10-14

## 2019-09-30 RX ORDER — MIRTAZAPINE 7.5 MG/1
7.5 TABLET, FILM COATED ORAL
Qty: 30 TABLET | Refills: 1 | Status: SHIPPED | OUTPATIENT
Start: 2019-09-30 | End: 2019-12-02 | Stop reason: SDUPTHER

## 2019-09-30 RX ORDER — DONEPEZIL HYDROCHLORIDE 5 MG/1
5 TABLET, FILM COATED ORAL
Qty: 30 TABLET | Refills: 1 | Status: SHIPPED | OUTPATIENT
Start: 2019-09-30 | End: 2019-12-02 | Stop reason: SDUPTHER

## 2019-09-30 RX ORDER — MEMANTINE HYDROCHLORIDE 5 MG/1
5 TABLET ORAL 2 TIMES DAILY
Qty: 60 TABLET | Refills: 1 | Status: SHIPPED | OUTPATIENT
Start: 2019-09-30 | End: 2019-12-01 | Stop reason: SDUPTHER

## 2019-09-30 RX ORDER — QUETIAPINE FUMARATE 25 MG/1
25 TABLET, FILM COATED ORAL
Qty: 30 TABLET | Refills: 1 | Status: SHIPPED | OUTPATIENT
Start: 2019-09-30 | End: 2019-12-02 | Stop reason: SDUPTHER

## 2019-09-30 NOTE — PSYCH
MEDICATION MANAGEMENT NOTE        [address]      Name and Date of Birth:  Dima Nath 80 y o  8/18/1927 MRN: [id number]    Date of Visit: September 30, 2019    SUBJECTIVE:    Gabrielle Westfall is seen today for a follow up for dementia as well as depressive-like symptoms  She is accompanied by her son; both say that Gabrielle Westfall is doing well  She is very well dressed with overall good hygiene, wearing makeup and jewelry  She states she has auditory hallucinations of either a voice or music 'every once in a while', though her son states she is able to be reminded that they aren't real tones  Patient denies depression and anxiety, stating "I feel good"  She reports sleeping well and states she does not get excited about food but "I don't miss a meal; I eat three times a day"  She is excited about her alumni banquet set for May 2020, stating she graduated with four people and will be excited to see who is there  She reports her daily activities include waking up and having coffee and toast, getting dressed, and watching television  "I don't get out much, but I like it"  Patient denies side effects of medications and does not appear to be having any  She was recently diagnosed with sigmoid diverticulitis though was given bolus antibiotics and has not had any issues since  She denies pain today      HPI ROS:             ('was' notes: recent => remote)  Medication Side Effects: denied  none   Depression (10 worst): 0/10 Was 0/10   Anxiety (10 worst): 0/10 Was 0/10   Safety concerns (SI, HI, etc): denied Was denied   Sleep: 'good', 8-10 hours Was "much better since the hospital"   Energy: adequate Was adequate   Appetite: No change Was increased healthfully   Weight Change: No change increased       Review Of Systems:      Constitutional negative   Cardiovascular negative   Respiratory negative   Gastrointestinal negative   Musculoskeletal negative Neurological negative   Endocrine negative       The following portions of the patient's history were reviewed and updated as appropriate: past family history, past medical history, past social history, past surgical history and problem list     Past Psychiatric History:   Previous diagnoses include depression  Prior outpatient psychiatric treatment: Had seen a psychiatrist in [place] [address] prior to recent hospitalization  Prior therapy: none  Prior inpatient psychiatric treatment: 7/19/19-8/5/19-  [address]   Prior suicide attempts: none  Prior self harm: none  Prior violence or aggression: none     Previous psychotropic medication trials:   Trilafon, Remeron, Seroquel      Past Medical History:    Past Medical History:   Diagnosis Date    Anxiety     Cardiac disease     Dementia     Depression     GERD (gastroesophageal reflux disease)     Hypertension     Hypothyroidism     Psychosis ([facility] [address] )     Seizures ([facility] )      No past medical history pertinent negatives  No past surgical history on file  Allergies   Allergen Reactions    Zyprexa [Olanzapine] Seizures       Substance Abuse History:    Social History     Substance and Sexual Activity   Alcohol Use Not Currently     Social History     Substance and Sexual Activity   Drug Use Not Currently       Social History:    Social History     Socioeconomic History    Marital status:       Spouse name: Not on file    Number of children: Not on file    Years of education: Not on file    Highest education level: Not on file   Occupational History    Not on file   Social Needs    Financial resource strain: Not on file    Food insecurity:     Worry: Not on file     Inability: Not on file    Transportation needs:     Medical: Not on file     Non-medical: Not on file   Tobacco Use    Smoking status: Never Smoker    Smokeless tobacco: Never Used   Substance and Sexual Activity    Alcohol use: Not Currently    Drug use: Not Currently    Sexual activity: Not Currently   Lifestyle    Physical activity:     Days per week: Not on file     Minutes per session: Not on file    Stress: Not on file   Relationships    Social connections:     Talks on phone: Not on file     Gets together: Not on file     Attends [employer] service: Not on file     Active member of club or organization: Not on file     Attends meetings of clubs or organizations: Not on file     Relationship status: Not on file    Intimate partner violence:     Fear of current or ex partner: Not on file     Emotionally abused: Not on file     Physically abused: Not on file     Forced sexual activity: Not on file   Other Topics Concern    Not on file   Social History Narrative    Not on file       Family Psychiatric History:     No family history on file  OBJECTIVE:     Vital signs in last 24 hours: There were no vitals filed for this visit      Mental Status Evaluation:    Appearance age appropriate, casually dressed   Behavior cooperative, calm   Speech normal rate, normal volume, normal pitch   Mood normal, euthymic   Affect normal range and intensity, appropriate   Thought Processes organized, goal directed   Associations intact associations   Thought Content no overt delusions   Perceptual Disturbances: no auditory hallucinations, no visual hallucinations   Abnormal Thoughts  Risk Potential Suicidal ideation - None  Homicidal ideation - None  Potential for aggression - No   Orientation oriented to person, place, time/date and situation   Memory recent and remote memory grossly intact   Consciousness alert and awake   Attention Span Concentration Span attention span and concentration are age appropriate   Intellect appears to be of average intelligence   Insight intact   Judgement intact   Muscle Strength and  Gait normal muscle strength and normal muscle tone, normal gait and normal balance   Motor activity no abnormal movements   Language no difficulty naming common objects, no difficulty repeating a phrase, no difficulty writing a sentence   Fund of Knowledge adequate knowledge of current events  adequate fund of knowledge regarding past history  adequate fund of knowledge regarding vocabulary    Pain none   Pain Scale 0       Laboratory Results:   I have personally reviewed all pertinent laboratory/tests results  Recent Labs (last 2 months):    Admission on 07/19/2019, Discharged on 08/05/2019   Component Date Value    Sodium 07/20/2019 129*    Potassium 07/20/2019 4 1     Chloride 07/20/2019 96*    CO2 07/20/2019 26     ANION GAP 07/20/2019 7     BUN 07/20/2019 12     Creatinine 07/20/2019 0 94     Glucose 07/20/2019 100*    Glucose, Fasting 07/20/2019 100*    Calcium 07/20/2019 9 2     eGFR 07/20/2019 53     Sodium 07/22/2019 129*    Potassium 07/22/2019 5 0     Chloride 07/22/2019 96*    CO2 07/22/2019 28     ANION GAP 07/22/2019 5     BUN 07/22/2019 23     Creatinine 07/22/2019 0 98     Glucose 07/22/2019 101*    Glucose, Fasting 07/22/2019 101*    Calcium 07/22/2019 9 1     eGFR 07/22/2019 51     Vit D, 25-Hydroxy 07/23/2019 18 6*    Vitamin B-12 07/23/2019 428     Sodium 07/24/2019 129*    Potassium 07/24/2019 4 8     Chloride 07/24/2019 97*    CO2 07/24/2019 28     ANION GAP 07/24/2019 4     BUN 07/24/2019 20     Creatinine 07/24/2019 1 06     Glucose 07/24/2019 93     Calcium 07/24/2019 9 1     eGFR 07/24/2019 46     Sodium 07/26/2019 133*    Potassium 07/26/2019 4 5     Chloride 07/26/2019 100     CO2 07/26/2019 28     ANION GAP 07/26/2019 5     BUN 07/26/2019 20     Creatinine 07/26/2019 0 88     Glucose 07/26/2019 86     Glucose, Fasting 07/26/2019 86     Calcium 07/26/2019 8 9     AST 07/26/2019 16     ALT 07/26/2019 11     Alkaline Phosphatase 07/26/2019 54*    Total Protein 07/26/2019 6 6     Albumin 07/26/2019 3 9     Total Bilirubin 07/26/2019 0 40     eGFR 07/26/2019 58     Sodium 07/30/2019 134     Potassium 07/30/2019 4 6     Chloride 07/30/2019 99     CO2 07/30/2019 31     ANION GAP 07/30/2019 4     BUN 07/30/2019 17     Creatinine 07/30/2019 1 15     Glucose 07/30/2019 82     Calcium 07/30/2019 9 3     eGFR 07/30/2019 42        No results found for this or any previous visit  Confidential Assessment:  Patient overall doing well both physically and mentally  I'd like to decrease HS Trilafon to 4 mg for overall safety and to decrease physical risks  Discussed this with patient's son who is open to this; he will call at the first sign of decline if that occurs  Scales:  Depression: 0/10  Anxiety: 0/10         Assessment/Plan:       Diagnoses and all orders for this visit:    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  -     donepezil (ARICEPT) 5 mg tablet; Take 1 tablet (5 mg total) by mouth daily at bedtime  -     mirtazapine (REMERON) 7 5 MG tablet; Take 1 tablet (7 5 mg total) by mouth daily at bedtime  -     memantine (NAMENDA) 5 mg tablet; Take 1 tablet (5 mg total) by mouth 2 (two) times a day  -     perphenazine 4 mg tablet; Take 1 tablet (4 mg total) by mouth 2 (two) times a day  -     QUEtiapine (SEROquel) 25 mg tablet; Take 1 tablet (25 mg total) by mouth daily at bedtime          Treatment Recommendations/Precautions/Plan:    Patient has been educated about their diagnosis and treatment modalities   They voiced understanding and agreement with the following plan:    Continue all medications, decreasing Trilafon to 4 mg BID     -Discussed self monitoring of symptoms, and symptom monitoring tools     -Patient has been informed of 24 hours and weekend coverage for urgent situations accessed by calling the main clinic phone number      -Completed and signed during the session: Not applicable - Treatment Plan not due at this session    Risks/Benefits      Risks, Benefits And Possible Side Effects Of Medications:    Risks, benefits, and possible side effects of medications explained to [facility] and she verbalizes understanding and agreement for treatment  Controlled Medication Discussion:     No recent records found for controlled prescriptions according to [facility] [employer] Monitoring Program   Last filled in March 2019  Psychotherapy Provided:     Individual psychotherapy provided: Medication education provided to [facility] and her son      Jaqui GarciaMAURICE 09/30/19

## 2019-10-07 ENCOUNTER — TELEPHONE (OUTPATIENT)
Dept: PSYCHIATRY | Facility: CLINIC | Age: 84
End: 2019-10-07

## 2019-10-07 NOTE — TELEPHONE ENCOUNTER
Patient was told to stop taking the perphenazine by her neurologist  No reason was provided   The provider is Dr Wellington De Leon from the [facility] in [place] and can be reached at [phone]

## 2019-10-14 ENCOUNTER — TELEPHONE (OUTPATIENT)
Dept: PSYCHIATRY | Facility: CLINIC | Age: 84
End: 2019-10-14

## 2019-10-14 DIAGNOSIS — F32.4 MAJOR DEPRESSIVE DISORDER WITH SINGLE EPISODE, IN PARTIAL REMISSION (HCC): Primary | ICD-10-CM

## 2019-10-14 RX ORDER — PERPHENAZINE 2 MG/1
2 TABLET, FILM COATED ORAL 2 TIMES DAILY
Qty: 60 TABLET | Refills: 1
Start: 2019-10-14 | End: 2019-12-14 | Stop reason: SDUPTHER

## 2019-12-01 DIAGNOSIS — F06.32 DEPRESSIVE DISORDER DUE TO ANOTHER MEDICAL CONDITION WITH MAJOR DEPRESSIVE-LIKE EPISODE: ICD-10-CM

## 2019-12-02 DIAGNOSIS — F06.32 DEPRESSIVE DISORDER DUE TO ANOTHER MEDICAL CONDITION WITH MAJOR DEPRESSIVE-LIKE EPISODE: ICD-10-CM

## 2019-12-02 RX ORDER — QUETIAPINE FUMARATE 25 MG/1
25 TABLET, FILM COATED ORAL
Qty: 30 TABLET | Refills: 1 | Status: SHIPPED | OUTPATIENT
Start: 2019-12-02 | End: 2019-12-30 | Stop reason: SDUPTHER

## 2019-12-02 RX ORDER — DONEPEZIL HYDROCHLORIDE 5 MG/1
5 TABLET, FILM COATED ORAL
Qty: 30 TABLET | Refills: 1 | Status: SHIPPED | OUTPATIENT
Start: 2019-12-02 | End: 2019-12-30 | Stop reason: SDUPTHER

## 2019-12-02 RX ORDER — MEMANTINE HYDROCHLORIDE 5 MG/1
TABLET ORAL
Qty: 60 TABLET | Refills: 1 | Status: SHIPPED | OUTPATIENT
Start: 2019-12-02 | End: 2019-12-30 | Stop reason: SDUPTHER

## 2019-12-02 RX ORDER — MIRTAZAPINE 7.5 MG/1
7.5 TABLET, FILM COATED ORAL
Qty: 30 TABLET | Refills: 1 | Status: SHIPPED | OUTPATIENT
Start: 2019-12-02 | End: 2019-12-30 | Stop reason: SDUPTHER

## 2019-12-14 DIAGNOSIS — F32.4 MAJOR DEPRESSIVE DISORDER WITH SINGLE EPISODE, IN PARTIAL REMISSION (HCC): ICD-10-CM

## 2019-12-16 RX ORDER — PERPHENAZINE 2 MG/1
TABLET, FILM COATED ORAL
Qty: 60 TABLET | Refills: 1 | Status: SHIPPED | OUTPATIENT
Start: 2019-12-16 | End: 2019-12-30 | Stop reason: SDUPTHER

## 2019-12-29 NOTE — PSYCH
MEDICATION MANAGEMENT NOTE        [address]      Name and Date of Birth:  Kishore Crook 80 y o  8/18/1927 MRN: [id number]    Date of Visit: December 30, 2019    SUBJECTIVE:    Lakisha Field is seen today for a follow up for Major Depressive Disorder and dementia  She is doing very well at this time; we had decreased Trilafon to 2 mg BID and she is much more steady, denies hallucinations, and is quite clear in conversation  Her son William Oliveira is present and also states she is doing great  She had a recent PCP visit and there were no concerns per her son  She continues to be excited about her alumni [employer] in May, stating "I love to dance"  She continues to sleep well at night and is eating normally, three meals a day  She has a chairlift at home which is helpful           HPI ROS:             ('was' notes: recent => remote)  Medication Side Effects:       Depression (10 worst): 0/10 Was 0/10   Anxiety (10 worst): 0/10 Was 0/10   Safety concerns (SI, HI, etc): denies Was denied   Sleep: good Was good   Energy: adequate Was adequate   Appetite: good Was no change   Weight Change: Gained per son No change       Review Of Systems:      Constitutional negative   Cardiovascular negative   Respiratory negative   Gastrointestinal negative   Musculoskeletal negative   Neurological negative   Endocrine negative       The following portions of the patient's history were reviewed and updated as appropriate: past family history, past medical history, past social history, past surgical history and problem list     Past Psychiatric History:   Previous diagnoses include depression  Prior outpatient psychiatric treatment: Had seen a psychiatrist in [place] [address] prior to recent hospitalization  Prior therapy: none  Prior inpatient psychiatric treatment: 7/19/19-8/5/19-  [address]   Prior suicide attempts: none  Prior self harm: none  Prior violence or aggression: none     Previous psychotropic medication trials:   Trilafon, Remeron, Seroquel    Past Medical History:    Past Medical History:   Diagnosis Date    Anxiety     Cardiac disease     Dementia     Depression     GERD (gastroesophageal reflux disease)     Hypertension     Hypothyroidism     Psychosis ([facility] [address] )     Seizures (HCC)      No past medical history pertinent negatives  No past surgical history on file  Allergies   Allergen Reactions    Zyprexa [Olanzapine] Seizures       Substance Abuse History:    Social History     Substance and Sexual Activity   Alcohol Use Not Currently     Social History     Substance and Sexual Activity   Drug Use Not Currently       Social History:    Social History     Socioeconomic History    Marital status:       Spouse name: Not on file    Number of children: Not on file    Years of education: Not on file    Highest education level: Not on file   Occupational History    Not on file   Social Needs    Financial resource strain: Not on file    Food insecurity:     Worry: Not on file     Inability: Not on file    Transportation needs:     Medical: Not on file     Non-medical: Not on file   Tobacco Use    Smoking status: Never Smoker    Smokeless tobacco: Never Used   Substance and Sexual Activity    Alcohol use: Not Currently    Drug use: Not Currently    Sexual activity: Not Currently   Lifestyle    Physical activity:     Days per week: Not on file     Minutes per session: Not on file    Stress: Not on file   Relationships    Social connections:     Talks on phone: Not on file     Gets together: Not on file     Attends Denominational service: Not on file     Active member of club or organization: Not on file     Attends meetings of clubs or organizations: Not on file     Relationship status: Not on file    Intimate partner violence:     Fear of current or ex partner: Not on file     Emotionally abused: Not on file     Physically abused: Not on file Forced sexual activity: Not on file   Other Topics Concern    Not on file   Social History Narrative    Not on file       Family Psychiatric History:     No family history on file  OBJECTIVE:     Vital signs in last 24 hours: There were no vitals filed for this visit  Mental Status Evaluation:    Appearance age appropriate, casually dressed   Behavior cooperative, calm   Speech normal rate, normal volume, normal pitch   Mood normal, euthymic   Affect normal range and intensity, appropriate   Thought Processes organized, goal directed   Associations intact associations   Thought Content no overt delusions   Perceptual Disturbances: no auditory hallucinations, no visual hallucinations   Abnormal Thoughts  Risk Potential Suicidal ideation - None  Homicidal ideation - None  Potential for aggression - No   Orientation oriented to person, place, time/date and situation   Memory recent and remote memory grossly intact   Consciousness alert and awake   Attention Span Concentration Span attention span and concentration are age appropriate   Intellect appears to be of average intelligence   Insight intact   Judgement intact   Muscle Strength and  Gait normal muscle strength and normal muscle tone, normal gait and normal balance   Motor activity no abnormal movements   Language no difficulty naming common objects, no difficulty repeating a phrase, no difficulty writing a sentence   Fund of Knowledge adequate knowledge of current events  adequate fund of knowledge regarding past history  adequate fund of knowledge regarding vocabulary    Pain none   Pain Scale 0       Laboratory Results:   I have personally reviewed all pertinent laboratory/tests results  Recent Labs (last 4 months):   No visits with results within 4 Month(s) from this visit     Latest known visit with results is:   Admission on 07/19/2019, Discharged on 08/05/2019   Component Date Value    Sodium 07/20/2019 129*    Potassium 07/20/2019 4 1     Chloride 07/20/2019 96*    CO2 07/20/2019 26     ANION GAP 07/20/2019 7     BUN 07/20/2019 12     Creatinine 07/20/2019 0 94     Glucose 07/20/2019 100*    Glucose, Fasting 07/20/2019 100*    Calcium 07/20/2019 9 2     eGFR 07/20/2019 53     Sodium 07/22/2019 129*    Potassium 07/22/2019 5 0     Chloride 07/22/2019 96*    CO2 07/22/2019 28     ANION GAP 07/22/2019 5     BUN 07/22/2019 23     Creatinine 07/22/2019 0 98     Glucose 07/22/2019 101*    Glucose, Fasting 07/22/2019 101*    Calcium 07/22/2019 9 1     eGFR 07/22/2019 51     Vit D, 25-Hydroxy 07/23/2019 18 6*    Vitamin B-12 07/23/2019 428     Sodium 07/24/2019 129*    Potassium 07/24/2019 4 8     Chloride 07/24/2019 97*    CO2 07/24/2019 28     ANION GAP 07/24/2019 4     BUN 07/24/2019 20     Creatinine 07/24/2019 1 06     Glucose 07/24/2019 93     Calcium 07/24/2019 9 1     eGFR 07/24/2019 46     Sodium 07/26/2019 133*    Potassium 07/26/2019 4 5     Chloride 07/26/2019 100     CO2 07/26/2019 28     ANION GAP 07/26/2019 5     BUN 07/26/2019 20     Creatinine 07/26/2019 0 88     Glucose 07/26/2019 86     Glucose, Fasting 07/26/2019 86     Calcium 07/26/2019 8 9     AST 07/26/2019 16     ALT 07/26/2019 11     Alkaline Phosphatase 07/26/2019 54*    Total Protein 07/26/2019 6 6     Albumin 07/26/2019 3 9     Total Bilirubin 07/26/2019 0 40     eGFR 07/26/2019 58     Sodium 07/30/2019 134     Potassium 07/30/2019 4 6     Chloride 07/30/2019 99     CO2 07/30/2019 31     ANION GAP 07/30/2019 4     BUN 07/30/2019 17     Creatinine 07/30/2019 1 15     Glucose 07/30/2019 82     Calcium 07/30/2019 9 3     eGFR 07/30/2019 42        No results found for this or any previous visit  Confidential Assessment:  Argelia  doing well; we will follow up in 6 months        Scales:  Depression: 0/10 (10 being the worst)  Anxiety: 0/10         Assessment/Plan:       Diagnoses and all orders for this visit:    Dementia without behavioral disturbance, unspecified dementia type ([facility] )    Major depressive disorder with single episode, in full remission ([facility] )    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  -     donepezil (ARICEPT) 5 mg tablet; Take 1 tablet (5 mg total) by mouth daily at bedtime  -     memantine (NAMENDA) 5 mg tablet; Take 1 tablet (5 mg total) by mouth 2 (two) times a day  -     mirtazapine (REMERON) 7 5 MG tablet; Take 1 tablet (7 5 mg total) by mouth daily at bedtime  -     QUEtiapine (SEROquel) 25 mg tablet; Take 1 tablet (25 mg total) by mouth daily at bedtime    Major depressive disorder with single episode, in partial remission ([facility])  -     perphenazine 2 mg tablet; Take 1 tablet (2 mg total) by mouth 2 (two) times a day          Treatment Recommendations/Precautions/Plan:    Patient has been educated about their diagnosis and treatment modalities  They voiced understanding and agreement with the following plan:    Continue current medications  Medication management every 6 months  Aware of 24 hour and weekend coverage for urgent situations accessed by calling [facility] main practice number    -Discussed self monitoring of symptoms, and symptom monitoring tools     -Patient has been informed of 24 hours and weekend coverage for urgent situations accessed by calling the main clinic phone number      -Completed and signed during the session: Yes - with Rere Molina due 6/30/20    Risks/Benefits      Risks, Benefits And Possible Side Effects Of Medications:    Risks, benefits, and possible side effects of medications explained to [facility] and she verbalizes understanding and agreement for treatment      Controlled Medication Discussion:     Not applicable    Psychotherapy Provided:     Individual psychotherapy provided: Yes  Medications, treatment progress and treatment plan reviewed with [facility] and her son, Galilea Scott   Medication education provided to MAURICE Owens 12/30/19

## 2019-12-30 ENCOUNTER — OFFICE VISIT (OUTPATIENT)
Dept: PSYCHIATRY | Facility: CLINIC | Age: 84
End: 2019-12-30
Payer: MEDICARE

## 2019-12-30 DIAGNOSIS — F32.5 MAJOR DEPRESSIVE DISORDER WITH SINGLE EPISODE, IN FULL REMISSION (HCC): ICD-10-CM

## 2019-12-30 DIAGNOSIS — F03.90 DEMENTIA WITHOUT BEHAVIORAL DISTURBANCE, UNSPECIFIED DEMENTIA TYPE (HCC): Primary | Chronic | ICD-10-CM

## 2019-12-30 DIAGNOSIS — F32.4 MAJOR DEPRESSIVE DISORDER WITH SINGLE EPISODE, IN PARTIAL REMISSION (HCC): ICD-10-CM

## 2019-12-30 DIAGNOSIS — F06.32 DEPRESSIVE DISORDER DUE TO ANOTHER MEDICAL CONDITION WITH MAJOR DEPRESSIVE-LIKE EPISODE: ICD-10-CM

## 2019-12-30 PROCEDURE — 99214 OFFICE O/P EST MOD 30 MIN: CPT | Performed by: NURSE PRACTITIONER

## 2019-12-30 RX ORDER — QUETIAPINE FUMARATE 25 MG/1
25 TABLET, FILM COATED ORAL
Qty: 30 TABLET | Refills: 3 | Status: SHIPPED | OUTPATIENT
Start: 2019-12-30 | End: 2020-02-03

## 2019-12-30 RX ORDER — DONEPEZIL HYDROCHLORIDE 5 MG/1
5 TABLET, FILM COATED ORAL
Qty: 30 TABLET | Refills: 3 | Status: SHIPPED | OUTPATIENT
Start: 2019-12-30 | End: 2020-05-29

## 2019-12-30 RX ORDER — PERPHENAZINE 2 MG/1
2 TABLET, FILM COATED ORAL 2 TIMES DAILY
Qty: 60 TABLET | Refills: 3 | Status: SHIPPED | OUTPATIENT
Start: 2019-12-30 | End: 2020-03-16

## 2019-12-30 RX ORDER — MEMANTINE HYDROCHLORIDE 5 MG/1
5 TABLET ORAL 2 TIMES DAILY
Qty: 60 TABLET | Refills: 3 | Status: SHIPPED | OUTPATIENT
Start: 2019-12-30 | End: 2020-02-01

## 2019-12-30 RX ORDER — MIRTAZAPINE 7.5 MG/1
7.5 TABLET, FILM COATED ORAL
Qty: 30 TABLET | Refills: 3 | Status: SHIPPED | OUTPATIENT
Start: 2019-12-30 | End: 2020-02-03

## 2019-12-30 NOTE — BH TREATMENT PLAN
TREATMENT PLAN (Medication Management Only)        [facility]    Name and Date of Birth:  Milly Lazaro 80 y o  8/18/1927  Date of Treatment Plan: December 30, 2019  Diagnosis/Diagnoses:  No diagnosis found  Strengths/Personal Resources for Self-Care: "I have four big kids and grand/great grandchildren"  Area/Areas of need (in own words): "n/a"  1  Long Term Goal: continue stability  Target Date: 2 months - 2/29/2020  Person/Persons responsible for completion of goal: MAURICE Noyola  2  Short Term Objective (s) - How will we reach this goal?:   A  Provider new recommended medication/dosage changes and/or continue medication(s): continue current medications as prescribed  B  N/A   C  N/A  Target Date: 6 months  Person/Persons Responsible for Completion of Goal: MAURICE Cade  Progress Towards Goals: continuing treatment  Treatment Modality: medication management every 6 months  Review due 90 to 120 days from date of this plan: 6 months  Expected length of service: maintenance  My Physician/PA/NP and I have developed this plan together and I agree to work on the goals and objectives  I understand the treatment goals that were developed for my treatment

## 2020-01-31 DIAGNOSIS — F06.32 DEPRESSIVE DISORDER DUE TO ANOTHER MEDICAL CONDITION WITH MAJOR DEPRESSIVE-LIKE EPISODE: ICD-10-CM

## 2020-02-01 RX ORDER — MEMANTINE HYDROCHLORIDE 5 MG/1
TABLET ORAL
Qty: 60 TABLET | Refills: 0 | Status: SHIPPED | OUTPATIENT
Start: 2020-02-01 | End: 2020-03-02

## 2020-02-02 DIAGNOSIS — F06.32 DEPRESSIVE DISORDER DUE TO ANOTHER MEDICAL CONDITION WITH MAJOR DEPRESSIVE-LIKE EPISODE: ICD-10-CM

## 2020-02-03 RX ORDER — MIRTAZAPINE 7.5 MG/1
TABLET, FILM COATED ORAL
Qty: 30 TABLET | Refills: 3 | Status: SHIPPED | OUTPATIENT
Start: 2020-02-03 | End: 2020-05-29

## 2020-02-03 RX ORDER — QUETIAPINE FUMARATE 25 MG/1
TABLET, FILM COATED ORAL
Qty: 30 TABLET | Refills: 3 | Status: SHIPPED | OUTPATIENT
Start: 2020-02-03 | End: 2020-05-29

## 2020-03-01 DIAGNOSIS — F06.32 DEPRESSIVE DISORDER DUE TO ANOTHER MEDICAL CONDITION WITH MAJOR DEPRESSIVE-LIKE EPISODE: ICD-10-CM

## 2020-03-02 RX ORDER — MEMANTINE HYDROCHLORIDE 5 MG/1
TABLET ORAL
Qty: 60 TABLET | Refills: 6 | Status: SHIPPED | OUTPATIENT
Start: 2020-03-02 | End: 2020-06-29 | Stop reason: SDUPTHER

## 2020-03-15 DIAGNOSIS — F32.4 MAJOR DEPRESSIVE DISORDER WITH SINGLE EPISODE, IN PARTIAL REMISSION (HCC): ICD-10-CM

## 2020-03-16 DIAGNOSIS — F32.4 MAJOR DEPRESSIVE DISORDER WITH SINGLE EPISODE, IN PARTIAL REMISSION (HCC): ICD-10-CM

## 2020-03-16 RX ORDER — PERPHENAZINE 2 MG/1
2 TABLET, FILM COATED ORAL 2 TIMES DAILY
Qty: 60 TABLET | Refills: 2 | OUTPATIENT
Start: 2020-03-16

## 2020-03-16 RX ORDER — PERPHENAZINE 2 MG/1
2 TABLET, FILM COATED ORAL 2 TIMES DAILY
Qty: 60 TABLET | Refills: 2 | Status: SHIPPED | OUTPATIENT
Start: 2020-03-16 | End: 2020-06-13

## 2020-05-28 DIAGNOSIS — F06.32 DEPRESSIVE DISORDER DUE TO ANOTHER MEDICAL CONDITION WITH MAJOR DEPRESSIVE-LIKE EPISODE: ICD-10-CM

## 2020-05-29 RX ORDER — DONEPEZIL HYDROCHLORIDE 5 MG/1
TABLET, FILM COATED ORAL
Qty: 30 TABLET | Refills: 3 | Status: SHIPPED | OUTPATIENT
Start: 2020-05-29 | End: 2020-06-29 | Stop reason: SDUPTHER

## 2020-05-29 RX ORDER — QUETIAPINE FUMARATE 25 MG/1
TABLET, FILM COATED ORAL
Qty: 30 TABLET | Refills: 3 | Status: SHIPPED | OUTPATIENT
Start: 2020-05-29 | End: 2020-06-29 | Stop reason: SDUPTHER

## 2020-05-29 RX ORDER — MIRTAZAPINE 7.5 MG/1
TABLET, FILM COATED ORAL
Qty: 30 TABLET | Refills: 3 | Status: SHIPPED | OUTPATIENT
Start: 2020-05-29 | End: 2020-06-29 | Stop reason: SDUPTHER

## 2020-06-12 DIAGNOSIS — F32.4 MAJOR DEPRESSIVE DISORDER WITH SINGLE EPISODE, IN PARTIAL REMISSION (HCC): ICD-10-CM

## 2020-06-13 RX ORDER — PERPHENAZINE 2 MG/1
TABLET, FILM COATED ORAL
Qty: 60 TABLET | Refills: 3 | Status: SHIPPED | OUTPATIENT
Start: 2020-06-13 | End: 2020-10-02 | Stop reason: SDUPTHER

## 2020-06-29 ENCOUNTER — OFFICE VISIT (OUTPATIENT)
Dept: PSYCHIATRY | Facility: CLINIC | Age: 85
End: 2020-06-29
Payer: MEDICARE

## 2020-06-29 DIAGNOSIS — F03.90 DEMENTIA WITHOUT BEHAVIORAL DISTURBANCE, UNSPECIFIED DEMENTIA TYPE (HCC): Chronic | ICD-10-CM

## 2020-06-29 DIAGNOSIS — F06.32 DEPRESSIVE DISORDER DUE TO ANOTHER MEDICAL CONDITION WITH MAJOR DEPRESSIVE-LIKE EPISODE: Primary | ICD-10-CM

## 2020-06-29 PROCEDURE — 99214 OFFICE O/P EST MOD 30 MIN: CPT | Performed by: NURSE PRACTITIONER

## 2020-06-29 RX ORDER — MEMANTINE HYDROCHLORIDE 5 MG/1
5 TABLET ORAL 2 TIMES DAILY
Qty: 60 TABLET | Refills: 6 | Status: SHIPPED | OUTPATIENT
Start: 2020-06-29 | End: 2020-12-07 | Stop reason: SDUPTHER

## 2020-06-29 RX ORDER — MIRTAZAPINE 7.5 MG/1
7.5 TABLET, FILM COATED ORAL
Qty: 30 TABLET | Refills: 3 | Status: SHIPPED | OUTPATIENT
Start: 2020-06-29 | End: 2020-11-23

## 2020-06-29 RX ORDER — QUETIAPINE FUMARATE 25 MG/1
25 TABLET, FILM COATED ORAL
Qty: 30 TABLET | Refills: 3 | Status: SHIPPED | OUTPATIENT
Start: 2020-06-29 | End: 2020-11-23

## 2020-06-29 RX ORDER — DONEPEZIL HYDROCHLORIDE 5 MG/1
5 TABLET, FILM COATED ORAL
Qty: 30 TABLET | Refills: 3 | Status: SHIPPED | OUTPATIENT
Start: 2020-06-29 | End: 2020-11-23

## 2020-10-02 DIAGNOSIS — F32.4 MAJOR DEPRESSIVE DISORDER WITH SINGLE EPISODE, IN PARTIAL REMISSION (HCC): ICD-10-CM

## 2020-10-02 RX ORDER — PERPHENAZINE 2 MG/1
2 TABLET, FILM COATED ORAL 2 TIMES DAILY
Qty: 60 TABLET | Refills: 1 | Status: SHIPPED | OUTPATIENT
Start: 2020-10-02 | End: 2020-12-07 | Stop reason: SDUPTHER

## 2020-11-20 DIAGNOSIS — F32.4 MAJOR DEPRESSIVE DISORDER WITH SINGLE EPISODE, IN PARTIAL REMISSION (HCC): ICD-10-CM

## 2020-11-20 DIAGNOSIS — F06.32 DEPRESSIVE DISORDER DUE TO ANOTHER MEDICAL CONDITION WITH MAJOR DEPRESSIVE-LIKE EPISODE: ICD-10-CM

## 2020-11-20 RX ORDER — PERPHENAZINE 2 MG/1
TABLET, FILM COATED ORAL
Qty: 60 TABLET | Refills: 1 | OUTPATIENT
Start: 2020-11-20

## 2020-11-23 DIAGNOSIS — F32.4 MAJOR DEPRESSIVE DISORDER WITH SINGLE EPISODE, IN PARTIAL REMISSION (HCC): ICD-10-CM

## 2020-11-23 RX ORDER — MIRTAZAPINE 7.5 MG/1
TABLET, FILM COATED ORAL
Qty: 30 TABLET | Refills: 3 | Status: SHIPPED | OUTPATIENT
Start: 2020-11-23 | End: 2020-12-07 | Stop reason: SDUPTHER

## 2020-11-23 RX ORDER — QUETIAPINE FUMARATE 25 MG/1
TABLET, FILM COATED ORAL
Qty: 30 TABLET | Refills: 3 | Status: SHIPPED | OUTPATIENT
Start: 2020-11-23 | End: 2020-12-07 | Stop reason: SDUPTHER

## 2020-11-23 RX ORDER — PERPHENAZINE 2 MG/1
TABLET, FILM COATED ORAL
Qty: 60 TABLET | Refills: 1 | OUTPATIENT
Start: 2020-11-23

## 2020-11-23 RX ORDER — DONEPEZIL HYDROCHLORIDE 5 MG/1
TABLET, FILM COATED ORAL
Qty: 30 TABLET | Refills: 3 | Status: SHIPPED | OUTPATIENT
Start: 2020-11-23 | End: 2020-12-07 | Stop reason: SDUPTHER

## 2020-12-07 ENCOUNTER — OFFICE VISIT (OUTPATIENT)
Dept: PSYCHIATRY | Facility: CLINIC | Age: 85
End: 2020-12-07
Payer: MEDICARE

## 2020-12-07 DIAGNOSIS — F03.90 DEMENTIA WITHOUT BEHAVIORAL DISTURBANCE, UNSPECIFIED DEMENTIA TYPE (HCC): Primary | Chronic | ICD-10-CM

## 2020-12-07 DIAGNOSIS — F32.4 MAJOR DEPRESSIVE DISORDER WITH SINGLE EPISODE, IN PARTIAL REMISSION (HCC): ICD-10-CM

## 2020-12-07 DIAGNOSIS — F06.32 DEPRESSIVE DISORDER DUE TO ANOTHER MEDICAL CONDITION WITH MAJOR DEPRESSIVE-LIKE EPISODE: ICD-10-CM

## 2020-12-07 PROBLEM — M81.0 AGE-RELATED OSTEOPOROSIS WITHOUT CURRENT PATHOLOGICAL FRACTURE: Status: ACTIVE | Noted: 2019-02-06

## 2020-12-07 PROBLEM — I10 HYPERTENSION: Status: ACTIVE | Noted: 2019-02-06

## 2020-12-07 PROBLEM — K21.9 GERD (GASTROESOPHAGEAL REFLUX DISEASE): Status: ACTIVE | Noted: 2019-02-06

## 2020-12-07 PROBLEM — E03.9 HYPOTHYROID: Status: ACTIVE | Noted: 2019-02-06

## 2020-12-07 PROBLEM — I49.5 SSS (SICK SINUS SYNDROME) (HCC): Status: ACTIVE | Noted: 2019-02-06

## 2020-12-07 PROBLEM — I48.91 FIBRILLATION, ATRIAL (HCC): Status: ACTIVE | Noted: 2019-02-06

## 2020-12-07 PROBLEM — I35.0 NONRHEUMATIC AORTIC VALVE STENOSIS: Status: ACTIVE | Noted: 2019-09-19

## 2020-12-07 PROCEDURE — 99214 OFFICE O/P EST MOD 30 MIN: CPT | Performed by: PHYSICIAN ASSISTANT

## 2020-12-07 RX ORDER — DONEPEZIL HYDROCHLORIDE 5 MG/1
5 TABLET, FILM COATED ORAL
Qty: 30 TABLET | Refills: 2 | Status: SHIPPED | OUTPATIENT
Start: 2020-12-07 | End: 2021-04-07 | Stop reason: ALTCHOICE

## 2020-12-07 RX ORDER — MIRTAZAPINE 7.5 MG/1
7.5 TABLET, FILM COATED ORAL
Qty: 30 TABLET | Refills: 2 | Status: SHIPPED | OUTPATIENT
Start: 2020-12-07 | End: 2021-03-21

## 2020-12-07 RX ORDER — QUETIAPINE FUMARATE 25 MG/1
25 TABLET, FILM COATED ORAL
Qty: 30 TABLET | Refills: 2 | Status: SHIPPED | OUTPATIENT
Start: 2020-12-07 | End: 2021-03-21

## 2020-12-07 RX ORDER — MEMANTINE HYDROCHLORIDE 5 MG/1
5 TABLET ORAL 2 TIMES DAILY
Qty: 60 TABLET | Refills: 3 | Status: SHIPPED | OUTPATIENT
Start: 2020-12-07 | End: 2021-04-07 | Stop reason: SDUPTHER

## 2020-12-07 RX ORDER — PERPHENAZINE 2 MG/1
2 TABLET, FILM COATED ORAL 2 TIMES DAILY
Qty: 60 TABLET | Refills: 4 | Status: SHIPPED | OUTPATIENT
Start: 2020-12-07 | End: 2021-04-07 | Stop reason: SDUPTHER

## 2021-03-20 DIAGNOSIS — F06.32 DEPRESSIVE DISORDER DUE TO ANOTHER MEDICAL CONDITION WITH MAJOR DEPRESSIVE-LIKE EPISODE: ICD-10-CM

## 2021-03-21 RX ORDER — QUETIAPINE FUMARATE 25 MG/1
TABLET, FILM COATED ORAL
Qty: 30 TABLET | Refills: 0 | Status: SHIPPED | OUTPATIENT
Start: 2021-03-21 | End: 2021-04-07 | Stop reason: SDUPTHER

## 2021-03-21 RX ORDER — MIRTAZAPINE 7.5 MG/1
TABLET, FILM COATED ORAL
Qty: 30 TABLET | Refills: 0 | Status: SHIPPED | OUTPATIENT
Start: 2021-03-21 | End: 2021-04-07 | Stop reason: SDUPTHER

## 2021-03-23 DIAGNOSIS — F06.32 DEPRESSIVE DISORDER DUE TO ANOTHER MEDICAL CONDITION WITH MAJOR DEPRESSIVE-LIKE EPISODE: ICD-10-CM

## 2021-03-23 RX ORDER — QUETIAPINE FUMARATE 25 MG/1
TABLET, FILM COATED ORAL
Qty: 30 TABLET | Refills: 2 | OUTPATIENT
Start: 2021-03-23

## 2021-03-23 RX ORDER — MIRTAZAPINE 7.5 MG/1
TABLET, FILM COATED ORAL
Qty: 30 TABLET | Refills: 2 | OUTPATIENT
Start: 2021-03-23

## 2021-04-07 ENCOUNTER — OFFICE VISIT (OUTPATIENT)
Dept: PSYCHIATRY | Facility: CLINIC | Age: 86
End: 2021-04-07
Payer: MEDICARE

## 2021-04-07 DIAGNOSIS — F32.4 MAJOR DEPRESSIVE DISORDER WITH SINGLE EPISODE, IN PARTIAL REMISSION (HCC): ICD-10-CM

## 2021-04-07 DIAGNOSIS — F06.32 DEPRESSIVE DISORDER DUE TO ANOTHER MEDICAL CONDITION WITH MAJOR DEPRESSIVE-LIKE EPISODE: ICD-10-CM

## 2021-04-07 DIAGNOSIS — F03.90 DEMENTIA WITHOUT BEHAVIORAL DISTURBANCE, UNSPECIFIED DEMENTIA TYPE (HCC): Primary | Chronic | ICD-10-CM

## 2021-04-07 PROCEDURE — 99213 OFFICE O/P EST LOW 20 MIN: CPT | Performed by: PHYSICIAN ASSISTANT

## 2021-04-07 RX ORDER — MEMANTINE HYDROCHLORIDE 5 MG/1
5 TABLET ORAL 2 TIMES DAILY
Qty: 60 TABLET | Refills: 2 | Status: SHIPPED | OUTPATIENT
Start: 2021-04-07 | End: 2021-07-19

## 2021-04-07 RX ORDER — PERPHENAZINE 2 MG/1
TABLET, FILM COATED ORAL
Qty: 30 TABLET | Refills: 0 | Status: SHIPPED | OUTPATIENT
Start: 2021-04-07 | End: 2021-05-24 | Stop reason: SDUPTHER

## 2021-04-07 RX ORDER — MIRTAZAPINE 7.5 MG/1
7.5 TABLET, FILM COATED ORAL
Qty: 30 TABLET | Refills: 2 | Status: SHIPPED | OUTPATIENT
Start: 2021-04-07 | End: 2021-06-16 | Stop reason: SDUPTHER

## 2021-04-07 RX ORDER — QUETIAPINE FUMARATE 25 MG/1
25 TABLET, FILM COATED ORAL
Qty: 30 TABLET | Refills: 2 | Status: SHIPPED | OUTPATIENT
Start: 2021-04-07 | End: 2021-06-16 | Stop reason: SDUPTHER

## 2021-04-07 RX ORDER — DIVALPROEX SODIUM 250 MG/1
TABLET, EXTENDED RELEASE ORAL
COMMUNITY
Start: 2021-03-19

## 2021-04-07 NOTE — PSYCH
MEDICATION MANAGEMENT NOTE        [address] [facility] [place]  [address]  [place] [address] [phone]        Name and Date of Birth:  Diane Velásquez 80 y o  8/18/1927    Date of Visit: April 7, 2021    SUBJECTIVE:      Kike Lozano seen with her daughter Lin Mcgill  Last seen by Dedrick 12/7 at which time meds unchanged  Since then Kike Lozano was admitted to [facility] with unresponsive episode -2/21-2/23  She had a "blank stare" x 5 min followed by lethargy x 5 min  Kike Lozano had a similar episode in Jan 2019  Had been on lamictal in past for this but lamictal was stopped during last psychiatric admission  On discharge 2/23/21 Aricept was stopped and depakote er 250 mg started  Rere f/u with neuro in June  She has had no other episodes since Feb   Rere and her daughter have no complaints  Kike Lozano continues to sleep well  She denies depressed mood saying depression is a "dirty word"  Kike Lozano denies and daughter has not observed any paranoia or A/V hallucinations  Kike Lozano will often sit with her head back during the day but daughter says she is not sleeping  Review of Systems   Constitutional: Negative for activity change and appetite change  Neurological: Negative for dizziness and syncope  Psychiatric/Behavioral: Negative for agitation, behavioral problems and hallucinations  Psychiatric History     This office since 8/19/20  - [facility] 7/19/20-8/5/20  No hx of suicide attempts  Trauma History      None reported  Social History         4 children  Retired - [employer] other [employer]         OBJECTIVE:     MENTAL STATUS EXAM  Appearance:  age appropriate, dressed casually   Behavior:  Pleasant & cooperative, fair eye contact   Speech:  paucity of speech   Mood:  euthymic   Affect:  constricted   Language: intact and appropriate for age, education, and intellect   Thought Process:  logical   Associations: intact associations   Thought Content:  no overt delusions   Perceptual Disturbances: no auditory or visual hallcunations, does not appear responding to internal stimuli   Risk Potential / Abnormal Thoughts: Suicidal ideation - None  Homicidal ideation - None  Potential for aggression - No       Consciousness:  Alert & Awake   Sensorium:  oriented to person and situation   Attention: attention span and concentration appear shorter than expected for age       Fund of Knowledge:  Memory: awareness of current events: no  recent memory impaired   Insight:  limited   Judgment: limited   Muscle Strength Muscle Tone: Grossly normal  normal   Gait/Station: slow   Motor Activity: no abnormal movements       Lab Review: I have reviewed all pertinent labs  2/21/21: CBC- WNl; TSH 4 60, Creat 1 18, GFR 40      ASSESSMENT & PLAN          Diagnoses and all orders for this visit:    Dementia without behavioral disturbance, unspecified dementia type (HCC)    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    Major depressive disorder with single episode, in partial remission ([facility] [address] )    Other orders  -     divalproex sodium (DEPAKOTE ER) 250 mg 24 hr tablet; TAKE ONE TABLET BY MOUTH AT NIGHT      Current Outpatient Medications   Medication Sig Dispense Refill    cyanocobalamin 1,000 mcg/mL Inject 1 mL (1,000 mcg total) into a muscle every 30 (thirty) days 1 mL 0    divalproex sodium (DEPAKOTE ER) 250 mg 24 hr tablet TAKE ONE TABLET BY MOUTH AT NIGHT      ergocalciferol (VITAMIN D2) 50,000 units Take 1 capsule (50,000 Units total) by mouth once a week for 6 doses 5 capsule 0    levothyroxine 50 mcg tablet Take 1 tablet (50 mcg total) by mouth daily in the early morning 14 tablet 0    losartan (COZAAR) 25 mg tablet Take 1 tablet (25 mg total) by mouth daily 14 tablet 0    meclizine (ANTIVERT) 12 5 MG tablet Take 1 tablet (12 5 mg total) by mouth every 8 (eight) hours as needed for dizziness 42 tablet 0    memantine (NAMENDA) 5 mg tablet Take 1 tablet (5 mg total) by mouth 2 (two) times a day 60 tablet 2    mirtazapine (REMERON) 7 5 MG tablet Take 1 tablet (7 5 mg total) by mouth daily at bedtime 30 tablet 2    nystatin (MYCOSTATIN) powder Apply topically 2 (two) times a day for 30 days 15 g 1    pantoprazole (PROTONIX) 40 mg tablet Take 1 tablet (40 mg total) by mouth daily in the early morning 14 tablet 0    perphenazine 2 mg tablet One po q pm x one month then stop 30 tablet 0    QUEtiapine (SEROquel) 25 mg tablet Take 1 tablet (25 mg total) by mouth daily at bedtime 30 tablet 2    rivaroxaban (XARELTO) 15 mg tablet Take 1 tablet (15 mg total) by mouth every evening 14 tablet 0    simvastatin (ZOCOR) 20 mg tablet Take 1 tablet (20 mg total) by mouth daily after dinner 14 tablet 0     No current facility-administered medications for this visit  Plan:       Jose Rafael Stratton had a seizure/seizure-like episode since last visit and was hosp at [facility]  She has been started on VPA and Aricept was stopped, namenda cont  Psychiatrically she is stable, although dementia appears a bit more progressive- Rere not as interactive as in the past    Will taper off perphenazine so as not to lower seizure threshold   Currently on perphenazine 2 mg bid  Will decrease to 2 mg q pm x one month then stop  Cont seroquel 25 mg q bedtime, remeron 7 5 mg q bedtime- namenda and depakote er per neuro  Reviewed risks, benefits, side effects of medications, including no medication  Patient understands and agrees to treatment plan  Call for any worsening of symptoms  F/u Dedrick 10 weeks, sooner prn     Patient has been informed of 24 hours and weekend coverage for urgent situations accessed by calling the main clinic phone number       Raman Zhou PA-C

## 2021-04-19 DIAGNOSIS — F06.32 DEPRESSIVE DISORDER DUE TO ANOTHER MEDICAL CONDITION WITH MAJOR DEPRESSIVE-LIKE EPISODE: ICD-10-CM

## 2021-04-19 RX ORDER — MEMANTINE HYDROCHLORIDE 5 MG/1
TABLET ORAL
Qty: 60 TABLET | Refills: 3 | OUTPATIENT
Start: 2021-04-19

## 2021-05-22 DIAGNOSIS — F32.4 MAJOR DEPRESSIVE DISORDER WITH SINGLE EPISODE, IN PARTIAL REMISSION (HCC): ICD-10-CM

## 2021-05-24 ENCOUNTER — TELEPHONE (OUTPATIENT)
Dept: PSYCHIATRY | Facility: CLINIC | Age: 86
End: 2021-05-24

## 2021-05-24 DIAGNOSIS — F32.4 MAJOR DEPRESSIVE DISORDER WITH SINGLE EPISODE, IN PARTIAL REMISSION (HCC): ICD-10-CM

## 2021-05-24 RX ORDER — PERPHENAZINE 2 MG/1
TABLET, FILM COATED ORAL
Qty: 30 TABLET | Refills: 0 | OUTPATIENT
Start: 2021-05-24

## 2021-05-24 RX ORDER — PERPHENAZINE 2 MG/1
2 TABLET, FILM COATED ORAL 2 TIMES DAILY
Refills: 0 | COMMUNITY
Start: 2021-05-24 | End: 2021-06-16 | Stop reason: SDUPTHER

## 2021-05-24 NOTE — PROGRESS NOTES
Spoke to Raghu Varela daughter Jude Libman  Nothing has improved since off perphenazine and  Is getting a bit worse- I e , perseveration  Will restart perphenazine at 2 mg bid until seen in office 6/16

## 2021-05-24 NOTE — TELEPHONE ENCOUNTER
Jeb would like to speak to you    Toshia Flower was taken off some medications and is now having problems

## 2021-06-16 ENCOUNTER — OFFICE VISIT (OUTPATIENT)
Dept: PSYCHIATRY | Facility: CLINIC | Age: 86
End: 2021-06-16
Payer: MEDICARE

## 2021-06-16 DIAGNOSIS — F06.32 DEPRESSIVE DISORDER DUE TO ANOTHER MEDICAL CONDITION WITH MAJOR DEPRESSIVE-LIKE EPISODE: ICD-10-CM

## 2021-06-16 DIAGNOSIS — F03.90 DEMENTIA WITHOUT BEHAVIORAL DISTURBANCE, UNSPECIFIED DEMENTIA TYPE (HCC): Primary | Chronic | ICD-10-CM

## 2021-06-16 DIAGNOSIS — F32.4 MAJOR DEPRESSIVE DISORDER WITH SINGLE EPISODE, IN PARTIAL REMISSION (HCC): ICD-10-CM

## 2021-06-16 PROCEDURE — 99212 OFFICE O/P EST SF 10 MIN: CPT | Performed by: PHYSICIAN ASSISTANT

## 2021-06-16 RX ORDER — PERPHENAZINE 2 MG/1
2 TABLET, FILM COATED ORAL 2 TIMES DAILY
Qty: 180 TABLET | Refills: 0 | Status: SHIPPED | OUTPATIENT
Start: 2021-06-16 | End: 2021-09-09 | Stop reason: SDUPTHER

## 2021-06-16 RX ORDER — MIRTAZAPINE 7.5 MG/1
7.5 TABLET, FILM COATED ORAL
Qty: 90 TABLET | Refills: 0 | Status: SHIPPED | OUTPATIENT
Start: 2021-06-16 | End: 2021-09-09 | Stop reason: SDUPTHER

## 2021-06-16 RX ORDER — QUETIAPINE FUMARATE 25 MG/1
25 TABLET, FILM COATED ORAL
Qty: 90 TABLET | Refills: 0 | Status: SHIPPED | OUTPATIENT
Start: 2021-06-16 | End: 2021-09-09 | Stop reason: SDUPTHER

## 2021-06-17 NOTE — PSYCH
MEDICATION MANAGEMENT NOTE        [address] [facility] [place]  [address] [address] [facility] [address] [phone]        Name and Date of Birth:  Asad Tinajero 80 y o  8/18/1927    Date of Visit: June 16, 2021  SUBJECTIVE:      Kofi Barker seen with her daughter Teresa Elena  Last seen by Pa-C  4/7/21 at which time we tapered off low dose perphenazine secondary to daytime tiredness and hx of seizures  On 5/24/21 daughter called Mark Anthony stating Kofi Barker not doing as well  -+perseveration  Daytime tiredness did not resolve  Perphenazine was restarted  DAguther states Kofi Barker is back to her psychiatric baseline  She has not had any seizures and continues to f/u with neurology  Is working with PCP on anemia which may be contributing to her tiredness  Kofi Barker denies and daughter has not observed hallucinations or paranoia, although she does talk to herself at times  Sleep and appetite are good and mood remains good  Kofi Barker says she is still dancing  Likes doing the jitterbug, but not the polka!       Review of Systems   Constitutional: Negative for activity change and appetite change  HENT: Positive for hearing loss  Chronic   Neurological: Negative for seizures  Psychiatric/Behavioral: Negative for sleep disturbance           Psychiatric, medical, social, trauma/loss hx reviewed        OBJECTIVE:     MENTAL STATUS EXAM  Appearance:  age appropriate, dressed casually   Behavior:  Pleasant & cooperative   Speech:  Normal volume, regular rate and rhythm   Mood:  euthymic   Affect:  mood congruent   Language: interactions limited secondary to hearing loss   Thought Process:  goal directed   Associations: intact associations   Thought Content:  no overt delusions   Perceptual Disturbances: no auditory or visual hallcunations   Risk Potential / Abnormal Thoughts: Suicidal ideation - None  Homicidal ideation - None  Potential for aggression - No       Consciousness:  Alert & Awake   Sensorium:  Oriented to person and situation   Attention: attention span and concentration appear shorter than expected for age       Fund of Knowledge:  Memory: awareness of current events: no  recent memory impaired   Insight:  limited   Judgment: limited   Muscle Strength Muscle Tone: Grossly normal  normal   Gait/Station: normal gait/station with good balance   Motor Activity: no abnormal movements       Lab Review: I have reviewed all pertinent labs  5/26/21: H&H 10/31, creat 0 97, GFR 51, lipids WNL, fasting glu 99, TSH, WNL, VPA 33; Na 138      ASSESSMENT & PLAN          Diagnoses and all orders for this visit:    Dementia without behavioral disturbance, unspecified dementia type ([facility] [address] )    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  -     mirtazapine (REMERON) 7 5 MG tablet; Take 1 tablet (7 5 mg total) by mouth daily at bedtime  -     QUEtiapine (SEROquel) 25 mg tablet; Take 1 tablet (25 mg total) by mouth daily at bedtime    Major depressive disorder with single episode, in partial remission ([facility])  -     perphenazine 2 mg tablet;  Take 1 tablet (2 mg total) by mouth 2 (two) times a day        Current Outpatient Medications   Medication Sig Dispense Refill    cyanocobalamin 1,000 mcg/mL Inject 1 mL (1,000 mcg total) into a muscle every 30 (thirty) days 1 mL 0    divalproex sodium (DEPAKOTE ER) 250 mg 24 hr tablet TAKE ONE TABLET BY MOUTH AT NIGHT      ergocalciferol (VITAMIN D2) 50,000 units Take 1 capsule (50,000 Units total) by mouth once a week for 6 doses 5 capsule 0    levothyroxine 50 mcg tablet Take 1 tablet (50 mcg total) by mouth daily in the early morning 14 tablet 0    losartan (COZAAR) 25 mg tablet Take 1 tablet (25 mg total) by mouth daily 14 tablet 0    meclizine (ANTIVERT) 12 5 MG tablet Take 1 tablet (12 5 mg total) by mouth every 8 (eight) hours as needed for dizziness 42 tablet 0    memantine (NAMENDA) 5 mg tablet Take 1 tablet (5 mg total) by mouth 2 (two) times a day 60 tablet 2    mirtazapine (REMERON) 7 5 MG tablet Take 1 tablet (7 5 mg total) by mouth daily at bedtime 90 tablet 0    nystatin (MYCOSTATIN) powder Apply topically 2 (two) times a day for 30 days 15 g 1    pantoprazole (PROTONIX) 40 mg tablet Take 1 tablet (40 mg total) by mouth daily in the early morning 14 tablet 0    perphenazine 2 mg tablet Take 1 tablet (2 mg total) by mouth 2 (two) times a day 180 tablet 0    QUEtiapine (SEROquel) 25 mg tablet Take 1 tablet (25 mg total) by mouth daily at bedtime 90 tablet 0    rivaroxaban (XARELTO) 15 mg tablet Take 1 tablet (15 mg total) by mouth every evening 14 tablet 0    simvastatin (ZOCOR) 20 mg tablet Take 1 tablet (20 mg total) by mouth daily after dinner 14 tablet 0     No current facility-administered medications for this visit  Plan:     Stable  No med change  Discussed risks/benefits of meds  Cont perphenazine 2 mg bid, seroquel 25 mg q bedtime, remeron 7 5 mg q bedtime    Reviewed risks, benefits, side effects of medications, including no medication  Patient understands and agrees to treatment plan  F/u Dedrick 3 mths, sooner prn         Patient has been informed of 24 hours and weekend coverage for urgent situations accessed by calling the main clinic phone number       Jorden Stacy PA-C

## 2021-07-18 DIAGNOSIS — F06.32 DEPRESSIVE DISORDER DUE TO ANOTHER MEDICAL CONDITION WITH MAJOR DEPRESSIVE-LIKE EPISODE: ICD-10-CM

## 2021-07-19 RX ORDER — MEMANTINE HYDROCHLORIDE 5 MG/1
TABLET ORAL
Qty: 60 TABLET | Refills: 2 | Status: SHIPPED | OUTPATIENT
Start: 2021-07-19 | End: 2021-09-09 | Stop reason: SDUPTHER

## 2021-07-26 DIAGNOSIS — F32.4 MAJOR DEPRESSIVE DISORDER WITH SINGLE EPISODE, IN PARTIAL REMISSION (HCC): ICD-10-CM

## 2021-07-26 RX ORDER — PERPHENAZINE 2 MG/1
2 TABLET, FILM COATED ORAL 2 TIMES DAILY
Qty: 180 TABLET | Refills: 0 | OUTPATIENT
Start: 2021-07-26

## 2021-07-27 NOTE — TELEPHONE ENCOUNTER
Spoke with the pharmacy, they had the medication on file  They will be filling the medication  Spoke with Justyna Patrick she is aware the pharmacy will be filling the medication

## 2021-09-09 ENCOUNTER — OFFICE VISIT (OUTPATIENT)
Dept: PSYCHIATRY | Facility: CLINIC | Age: 86
End: 2021-09-09
Payer: MEDICARE

## 2021-09-09 DIAGNOSIS — F03.90 DEMENTIA WITHOUT BEHAVIORAL DISTURBANCE, UNSPECIFIED DEMENTIA TYPE (HCC): Primary | Chronic | ICD-10-CM

## 2021-09-09 DIAGNOSIS — F32.4 MAJOR DEPRESSIVE DISORDER WITH SINGLE EPISODE, IN PARTIAL REMISSION (HCC): ICD-10-CM

## 2021-09-09 DIAGNOSIS — F06.32 DEPRESSIVE DISORDER DUE TO ANOTHER MEDICAL CONDITION WITH MAJOR DEPRESSIVE-LIKE EPISODE: ICD-10-CM

## 2021-09-09 PROCEDURE — 99212 OFFICE O/P EST SF 10 MIN: CPT | Performed by: PHYSICIAN ASSISTANT

## 2021-09-09 RX ORDER — PERPHENAZINE 2 MG/1
2 TABLET, FILM COATED ORAL 2 TIMES DAILY
Qty: 180 TABLET | Refills: 0 | Status: SHIPPED | OUTPATIENT
Start: 2021-09-09 | End: 2022-01-05 | Stop reason: SDUPTHER

## 2021-09-09 RX ORDER — MEMANTINE HYDROCHLORIDE 5 MG/1
5 TABLET ORAL 2 TIMES DAILY
Qty: 60 TABLET | Refills: 3 | Status: SHIPPED | OUTPATIENT
Start: 2021-09-09 | End: 2021-12-13

## 2021-09-09 RX ORDER — MIRTAZAPINE 7.5 MG/1
7.5 TABLET, FILM COATED ORAL
Qty: 90 TABLET | Refills: 0 | Status: SHIPPED | OUTPATIENT
Start: 2021-09-09 | End: 2021-12-15

## 2021-09-09 RX ORDER — QUETIAPINE FUMARATE 25 MG/1
25 TABLET, FILM COATED ORAL
Qty: 90 TABLET | Refills: 0 | Status: SHIPPED | OUTPATIENT
Start: 2021-09-09 | End: 2021-12-15

## 2021-09-13 NOTE — BH TREATMENT PLAN
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    3  Major depressive disorder with single episode, in partial remission [facility])      Strengths/Personal Resources for Self-Care: supportive family, taking medications as prescribed, good physical health, sense of humor, special hobby/interest   Area/Areas of need (in own words): depression  1  Long Term Goal: maintain control of depression  Target Date:3 months - 12/13/2021  Person/Persons responsible for completion of goal: Rere  2  Short Term Objective (s) - How will we reach this goal?:   A  Provider new recommended medication/dosage changes and/or continue medication(s): continue current medications as prescribed  B  Attend medication management appointments regularly  C  Take psychiatric medications responsibly  Target Date:3 months - 12/13/2021  Person/Persons Responsible for Completion of Goal: Rere/Dedrick /MD  Progress Towards Goals: continuing treatment  Treatment Modality: medication management every 4 months  Review due 180 days from date of this plan: 4 months - 1/13/2022  Expected length of service: maintenance  My Physician/PA/NP and I have developed this plan together and I agree to work on the goals and objectives  I understand the treatment goals that were developed for my treatment    Treatment Plan done but not signed at time of office visit due to:  Plan reviewed by phone or in person  and verbal consent given due to Gil social elen

## 2021-09-13 NOTE — PSYCH
MEDICATION MANAGEMENT NOTE        [address] [facility] [place]  [address] [facility] [address] [phone]        Name and Date of Birth:  Tu Willson 80 y o  8/18/1927    Date of Visit: September 9 , 2021  SUBJECTIVE:     Nfetaly Cole seen with her daughter Jameel Romero  Last seen by Dedrick 6/16 at which time meds unchanged  Since last visit Neftaly Cole admitted medically to [facility]- 7/8-7/15/21 for symptomatic anemia requiring transfusion  Neftaly Cole doing well since discharge  Her legs no longer hurt and she is less sleepy during the day  Otherwise, no changes  Continues to spend time with her family and does most ADLs on her own  Mood has been good  No hallucinations or paranoia  Sleep and appetite good  Review of Systems   Constitutional: Negative for activity change and appetite change  Neurological: Negative for seizures and syncope  No falls   Psychiatric/Behavioral: Negative for sleep disturbance  Psychiatric History     This office since 8/19/20  [facility] 7/19/20-8/5/20  No hx of suicide attempts  Trauma History      None reported  Social History         4 children  Retired - sewing and other factories              OBJECTIVE:     MENTAL STATUS EXAM  Appearance:  age appropriate, dressed casually   Behavior:  Pleasant & cooperative   Speech:  paucity of speech   Mood:  euthymic   Affect:  constricted   Language: repeating phrases   Thought Process:  thought blocking   Associations: thought blocking   Thought Content:  no overt delusions   Perceptual Disturbances: no auditory or visual hallcunations, does not appear responding to internal stimuli   Risk Potential / Abnormal Thoughts: Suicidal ideation - None  Homicidal ideation - None  Potential for aggression - No       Consciousness:  Alert & Awake   Sensorium:  oriented to person Attention: attention span and concentration appear shorter than expected for age       Fund of Knowledge:  Memory: vocabulary: limited  recent memory impaired   Insight:  limited   Judgment: limited   Muscle Strength Muscle Tone: Grossly normal  normal   Gait/Station: slow   Motor Activity: no abnormal movements       Lab Review: I have reviewed all pertinent labs  9/8/21: H&H 12/35, CMP - WNL creat 0 87, GFR 57      ASSESSMENT & PLAN          Diagnoses and all orders for this visit:    Dementia without behavioral disturbance, unspecified dementia type ([facility] [address] )    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  -     memantine (NAMENDA) 5 mg tablet; Take 1 tablet (5 mg total) by mouth 2 (two) times a day  -     mirtazapine (REMERON) 7 5 MG tablet; Take 1 tablet (7 5 mg total) by mouth daily at bedtime  -     QUEtiapine (SEROquel) 25 mg tablet; Take 1 tablet (25 mg total) by mouth daily at bedtime    Major depressive disorder with single episode, in partial remission (HCC)  -     perphenazine 2 mg tablet;  Take 1 tablet (2 mg total) by mouth 2 (two) times a day        Current Outpatient Medications   Medication Sig Dispense Refill    cyanocobalamin 1,000 mcg/mL Inject 1 mL (1,000 mcg total) into a muscle every 30 (thirty) days 1 mL 0    divalproex sodium (DEPAKOTE ER) 250 mg 24 hr tablet TAKE ONE TABLET BY MOUTH AT NIGHT      ergocalciferol (VITAMIN D2) 50,000 units Take 1 capsule (50,000 Units total) by mouth once a week for 6 doses 5 capsule 0    levothyroxine 50 mcg tablet Take 1 tablet (50 mcg total) by mouth daily in the early morning 14 tablet 0    losartan (COZAAR) 25 mg tablet Take 1 tablet (25 mg total) by mouth daily 14 tablet 0    meclizine (ANTIVERT) 12 5 MG tablet Take 1 tablet (12 5 mg total) by mouth every 8 (eight) hours as needed for dizziness 42 tablet 0    memantine (NAMENDA) 5 mg tablet Take 1 tablet (5 mg total) by mouth 2 (two) times a day 60 tablet 3    mirtazapine (REMERON) 7 5 MG tablet Take 1 tablet (7 5 mg total) by mouth daily at bedtime 90 tablet 0    nystatin (MYCOSTATIN) powder Apply topically 2 (two) times a day for 30 days 15 g 1    pantoprazole (PROTONIX) 40 mg tablet Take 1 tablet (40 mg total) by mouth daily in the early morning 14 tablet 0    perphenazine 2 mg tablet Take 1 tablet (2 mg total) by mouth 2 (two) times a day 180 tablet 0    QUEtiapine (SEROquel) 25 mg tablet Take 1 tablet (25 mg total) by mouth daily at bedtime 90 tablet 0    rivaroxaban (XARELTO) 15 mg tablet Take 1 tablet (15 mg total) by mouth every evening 14 tablet 0    simvastatin (ZOCOR) 20 mg tablet Take 1 tablet (20 mg total) by mouth daily after dinner 14 tablet 0     No current facility-administered medications for this visit  Plan:         Stable  Cont med sunchanged  Previous attempt this year to decrease antipsychotic resulted in worsening symptoms  Cont perphenazine 2 mg bid, seorquel 25 mg q bedtime, remeron 7 5 mg q bedtime, Namenda 5 mg bid    Reviewed risks, benefits, side effects of medications, including no medication  Patient understands and agrees to treatment plan  F/u Dedrick 4 mths,sooner prn         Patient has been informed of 24 hours and weekend coverage for urgent situations accessed by calling the main clinic phone number       Power Lovelace PA-C

## 2021-12-13 NOTE — PSYCH
1.  Please return to clinic at your next scheduled visit.  Contact the clinic ([phone]) at least 24 hours prior in the event you need to cancel.  2.  Do no harm to yourself or others.    3.  Avoid alcohol and drugs.    4.  Take all medications as prescribed.  Please contact the clinic with any concerns. If you are in need of medication refills, please call the clinic at [phone].    5. Should you want to get in touch with your provider, Dr. Vinny Krishnamurthy, please utilize Meeps or contact the office ([phone]), and staff will be able to page Dr. Krishnamurthy directly.  6.  In the event you have personal crisis, contact the following crisis numbers: Suicide Prevention Hotline 1-278.447.5167; [facility] Helpline 4-686-381-PADD; [facility] Emergency Room [phone]; text HELLO to 222558; or 076.     SPECIFIC RECOMMENDATIONS:     1.      Medications discussed at this encounter:                   - Taper off elavil by taking twice daily for 7 days, then once nightly from then on. Start effexor 37.5 mg daily. Continue latuda 20 mg nightly with food, ativan 0.5 bid.     2.      Psychotherapy recommendations:      3.     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)      Strengths/Personal Resources for Self-Care: supportive family, taking medications as prescribed, ability to negotiate basic needs  Area/Areas of need (in own words): depression  1  Long Term Goal: maintain control of depression  Target Date:3 months - 7/8/2021  Person/Persons responsible for completion of goal: Rere  2  Short Term Objective (s) - How will we reach this goal?:   A  Provider new recommended medication/dosage changes and/or continue medication(s): continue current medications as prescribed  B  Attend medication management appointments regularly  C  Take psychiatric medications responsibly  Target Date:3 months - 7/8/2021  Person/Persons Responsible for Completion of Goal: Rere/daughter Eve/KENNY/MD  Progress Towards Goals: continuing treatment  Treatment Modality: medication management every 10 weeks  Review due 180 days from date of this plan: 4 months - 8/8/2021  Expected length of service: ongoing treatment  My Physician/PA/NP and I have developed this plan together and I agree to work on the goals and objectives  I understand the treatment goals that were developed for my treatment    Treatment Plan done but not signed at time of office visit due to:  Plan reviewed by phone or in person  and verbal consent given due to Gil social elen

## 2021-12-15 DIAGNOSIS — F06.32 DEPRESSIVE DISORDER DUE TO ANOTHER MEDICAL CONDITION WITH MAJOR DEPRESSIVE-LIKE EPISODE: ICD-10-CM

## 2021-12-15 RX ORDER — MEMANTINE HYDROCHLORIDE 5 MG/1
TABLET ORAL
Qty: 60 TABLET | Refills: 3 | Status: SHIPPED | OUTPATIENT
Start: 2021-12-15 | End: 2022-04-05 | Stop reason: SDUPTHER

## 2021-12-15 RX ORDER — QUETIAPINE FUMARATE 25 MG/1
TABLET, FILM COATED ORAL
Qty: 90 TABLET | Refills: 0 | Status: SHIPPED | OUTPATIENT
Start: 2021-12-15 | End: 2022-03-14

## 2021-12-15 RX ORDER — MIRTAZAPINE 7.5 MG/1
TABLET, FILM COATED ORAL
Qty: 90 TABLET | Refills: 0 | Status: SHIPPED | OUTPATIENT
Start: 2021-12-15 | End: 2022-03-14

## 2022-01-05 ENCOUNTER — OFFICE VISIT (OUTPATIENT)
Dept: PSYCHIATRY | Facility: CLINIC | Age: 87
End: 2022-01-05
Payer: MEDICARE

## 2022-01-05 DIAGNOSIS — F06.32 DEPRESSIVE DISORDER DUE TO ANOTHER MEDICAL CONDITION WITH MAJOR DEPRESSIVE-LIKE EPISODE: Primary | ICD-10-CM

## 2022-01-05 DIAGNOSIS — F32.4 MAJOR DEPRESSIVE DISORDER WITH SINGLE EPISODE, IN PARTIAL REMISSION (HCC): ICD-10-CM

## 2022-01-05 DIAGNOSIS — F03.90 DEMENTIA WITHOUT BEHAVIORAL DISTURBANCE, UNSPECIFIED DEMENTIA TYPE (HCC): Chronic | ICD-10-CM

## 2022-01-05 PROCEDURE — 99213 OFFICE O/P EST LOW 20 MIN: CPT | Performed by: PHYSICIAN ASSISTANT

## 2022-01-05 RX ORDER — PERPHENAZINE 2 MG/1
2 TABLET, FILM COATED ORAL 2 TIMES DAILY
Qty: 180 TABLET | Refills: 0 | Status: SHIPPED | OUTPATIENT
Start: 2022-01-05 | End: 2022-04-05 | Stop reason: SDUPTHER

## 2022-01-07 NOTE — PSYCH
MEDICATION MANAGEMENT NOTE        [address]  [facility] [place]  [address] [facility]  [place] [address] [phone]        Name and Date of Birth:  Constantino Farah 80 y o  8/18/1927    Date of Visit: January 5 ,2022  SUBJECTIVE:     Vincent Russell seen with her daughter  Last seen by KENNY 9/9 at which time meds unchanged  Vincent Russell continues to remain psychiatrically stable  Her mood has been good  She denies distressing thoughts, paranoia  Denies A/V hallucinations  Daughter has not noticed any of these symptoms either  Sleep is good and appetite variable  Medically Vincent Russell requires a colonoscopy for a mass but she has not been able to tolerate the prep  However, she is asymptomatic currently  Review of Systems   HENT: Negative for sore throat  Hoarseness   Respiratory: Negative for cough and shortness of breath  Gastrointestinal: Negative for abdominal pain  Musculoskeletal: Negative for arthralgias, gait problem and myalgias  Psychiatric/Behavioral: Negative for sleep disturbance  Psychiatric History     This office since 8/19/20  [facility] 7/19/20-8/5/20  No hx of suicide attempts  Trauma History      None reported  Social History         4 children  Retired - sewing and other factories              OBJECTIVE:     MENTAL STATUS EXAM  Appearance:  age appropriate, good grooming and hygiene   Behavior:  Pleasant & cooperative   Speech:  Normal volume, regular rate and rhythm   Mood:  euthymic   Affect:  mood congruent   Language: intact and appropriate for age, education, and intellect   Thought Process:  logical   Associations: intact associations   Thought Content:  no overt delusions   Perceptual Disturbances: no auditory or visual hallcunations, does not appear responding to internal stimuli   Risk Potential / Abnormal Thoughts: Suicidal ideation - None  Homicidal ideation - None  Potential for aggression - No       Consciousness:  Alert & Awake   Sensorium:  Oriented to person   Attention: attention span and concentration appear shorter than expected for age       Fund of Knowledge:  Memory: past history: limited  recent memory impaired   Insight:  limited   Judgment: limited   Muscle Strength Muscle Tone: Grossly normal  normal   Gait/Station: slow   Motor Activity: no abnormal movements       Lab Review: I have reviewed all pertinent labs  12/21/21: H&H 11 3/34 0, plts 394; albumin 2 8, creat 0 94, GFR 52, Na 140, TSH -WNL      ASSESSMENT & PLAN          Diagnoses and all orders for this visit:    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    Major depressive disorder with single episode, in partial remission (HCC)  -     perphenazine 2 mg tablet;  Take 1 tablet (2 mg total) by mouth 2 (two) times a day    Dementia without behavioral disturbance, unspecified dementia type (HCC)        Current Outpatient Medications   Medication Sig Dispense Refill    cyanocobalamin 1,000 mcg/mL Inject 1 mL (1,000 mcg total) into a muscle every 30 (thirty) days 1 mL 0    divalproex sodium (DEPAKOTE ER) 250 mg 24 hr tablet TAKE ONE TABLET BY MOUTH AT NIGHT      ergocalciferol (VITAMIN D2) 50,000 units Take 1 capsule (50,000 Units total) by mouth once a week for 6 doses 5 capsule 0    levothyroxine 50 mcg tablet Take 1 tablet (50 mcg total) by mouth daily in the early morning 14 tablet 0    losartan (COZAAR) 25 mg tablet Take 1 tablet (25 mg total) by mouth daily 14 tablet 0    meclizine (ANTIVERT) 12 5 MG tablet Take 1 tablet (12 5 mg total) by mouth every 8 (eight) hours as needed for dizziness 42 tablet 0    memantine (NAMENDA) 5 mg tablet TAKE ONE TABLET BY MOUTH TWICE A DAY 60 tablet 3    mirtazapine (REMERON) 7 5 MG tablet TAKE ONE TABLET BY MOUTH AT BEDTIME 90 tablet 0    nystatin (MYCOSTATIN) powder Apply topically 2 (two) times a day for 30 days 15 g 1    pantoprazole (PROTONIX) 40 mg tablet Take 1 tablet (40 mg total) by mouth daily in the early morning 14 tablet 0    perphenazine 2 mg tablet Take 1 tablet (2 mg total) by mouth 2 (two) times a day 180 tablet 0    QUEtiapine (SEROquel) 25 mg tablet TAKE ONE TABLET BY MOUTH AT BEDTIME 90 tablet 0    rivaroxaban (XARELTO) 15 mg tablet Take 1 tablet (15 mg total) by mouth every evening 14 tablet 0    simvastatin (ZOCOR) 20 mg tablet Take 1 tablet (20 mg total) by mouth daily after dinner 14 tablet 0     No current facility-administered medications for this visit  Plan:       Stable  Previous attempts at decreasing antipsychotics have resulted in ruminations and perseveration  Will cont perphenazine 2 mg bid, seroquel 25 mg q bedtime, remeron 7 5 mg q bedtime, namenda 5 mg bid  Reviewed risks, benefits, side effects of medications, including no medication  Patient understands and agrees to treatment plan  F/u Dedrick 3 mths, sooner prn    Patient has been informed of 24 hours and weekend coverage for urgent situations accessed by calling the main clinic phone number       Radu Wilson PA-C

## 2022-03-12 DIAGNOSIS — F06.32 DEPRESSIVE DISORDER DUE TO ANOTHER MEDICAL CONDITION WITH MAJOR DEPRESSIVE-LIKE EPISODE: ICD-10-CM

## 2022-03-14 RX ORDER — MIRTAZAPINE 7.5 MG/1
TABLET, FILM COATED ORAL
Qty: 90 TABLET | Refills: 0 | Status: SHIPPED | OUTPATIENT
Start: 2022-03-14 | End: 2022-04-05 | Stop reason: SDUPTHER

## 2022-03-14 RX ORDER — QUETIAPINE FUMARATE 25 MG/1
TABLET, FILM COATED ORAL
Qty: 90 TABLET | Refills: 0 | Status: SHIPPED | OUTPATIENT
Start: 2022-03-14 | End: 2022-04-05 | Stop reason: SDUPTHER

## 2022-04-05 ENCOUNTER — OFFICE VISIT (OUTPATIENT)
Dept: PSYCHIATRY | Facility: CLINIC | Age: 87
End: 2022-04-05
Payer: MEDICARE

## 2022-04-05 DIAGNOSIS — F32.4 MAJOR DEPRESSIVE DISORDER WITH SINGLE EPISODE, IN PARTIAL REMISSION (HCC): ICD-10-CM

## 2022-04-05 DIAGNOSIS — F06.32 DEPRESSIVE DISORDER DUE TO ANOTHER MEDICAL CONDITION WITH MAJOR DEPRESSIVE-LIKE EPISODE: ICD-10-CM

## 2022-04-05 DIAGNOSIS — F03.90 DEMENTIA WITHOUT BEHAVIORAL DISTURBANCE, UNSPECIFIED DEMENTIA TYPE (HCC): Primary | Chronic | ICD-10-CM

## 2022-04-05 PROCEDURE — 99213 OFFICE O/P EST LOW 20 MIN: CPT | Performed by: PHYSICIAN ASSISTANT

## 2022-04-05 RX ORDER — QUETIAPINE FUMARATE 25 MG/1
25 TABLET, FILM COATED ORAL
Qty: 90 TABLET | Refills: 0 | Status: SHIPPED | OUTPATIENT
Start: 2022-04-05 | End: 2022-07-27 | Stop reason: SDUPTHER

## 2022-04-05 RX ORDER — MIRTAZAPINE 7.5 MG/1
7.5 TABLET, FILM COATED ORAL
Qty: 90 TABLET | Refills: 0 | Status: SHIPPED | OUTPATIENT
Start: 2022-04-05 | End: 2022-07-27 | Stop reason: SDUPTHER

## 2022-04-05 RX ORDER — PERPHENAZINE 2 MG/1
2 TABLET, FILM COATED ORAL 2 TIMES DAILY
Qty: 180 TABLET | Refills: 0 | Status: SHIPPED | OUTPATIENT
Start: 2022-04-05 | End: 2022-07-27 | Stop reason: SDUPTHER

## 2022-04-05 RX ORDER — MEMANTINE HYDROCHLORIDE 5 MG/1
5 TABLET ORAL 2 TIMES DAILY
Qty: 60 TABLET | Refills: 3 | Status: SHIPPED | OUTPATIENT
Start: 2022-04-05 | End: 2022-07-27 | Stop reason: SDUPTHER

## 2022-04-05 NOTE — PSYCH
MEDICATION MANAGEMENT NOTE        [address] [facility] [place]  [address] [facility]  [place] [id number] [phone]        Name and Date of Birth:  Leslie Calhoun 80 y o  8/18/1927    Date of Visit: April 5, 2022/seen with RAMIREZ Lowe with pt's permission    SUBJECTIVE:     Luis Miguel Ford seen with her daughter  Last seen by Dedrick 1/5 at which time meds unchanged  Overall, Luis Miguel Ford doing ok  No major changes  Daughter notes Luis Miguel Ford waking up confused and disoriented x 2 since last visit  These symptoms did not persist  A few times has reported hearing music but no other perceptual disturbances  She has had no falls or seizures  Is looking forward to high school reunion in May which she attends regularly  Review of Systems   Constitutional: Negative for activity change and appetite change  Musculoskeletal: Negative for gait problem  Neurological: Negative for seizures and syncope  Psychiatric/Behavioral: Negative for sleep disturbance  Psychiatric History     This office since 8/19/20  - [facility] [address] [facility] 7/19/20-8/5/20  No hx of suicide attempts  Trauma History      None reported  Social History         4 children  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              OBJECTIVE:     MENTAL STATUS EXAM  Appearance:  age appropriate, dressed casually   Behavior:  Pleasant & cooperative   Speech:  Normal volume, regular rate and rhythm   Mood:  euthymic   Affect:  mood congruent   Language: intact and appropriate for age, education, and intellect   Thought Process:  logical   Associations: intact associations   Thought Content:  no overt delusions   Perceptual Disturbances: does not appear responding to internal stimuli   Risk Potential / Abnormal Thoughts: Suicidal ideation - None  Homicidal ideation - None  Potential for aggression - No Consciousness:  Alert & Awake   Sensorium:  oriented to person   Attention: attention span and concentration appear shorter than expected for age       Fund of Knowledge:  Memory: past history: yes  recent memory impaired   Insight:  limited   Judgment: limited   Muscle Strength Muscle Tone: Grossly normal  normal   Gait/Station: normal gait/station with good balance   Motor Activity: no abnormal movements       Lab Review: I have reviewed all pertinent labs  2/16/22: creat 0 93, GFR 53; 12/21/22: H&H 11 3/34 0; TSH- WNL      ASSESSMENT & PLAN          Diagnoses and all orders for this visit:    Dementia without behavioral disturbance, unspecified dementia type ([facility] [address] )    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  -     memantine (NAMENDA) 5 mg tablet; Take 1 tablet (5 mg total) by mouth 2 (two) times a day  -     mirtazapine (REMERON) 7 5 MG tablet; Take 1 tablet (7 5 mg total) by mouth daily at bedtime  -     QUEtiapine (SEROquel) 25 mg tablet; Take 1 tablet (25 mg total) by mouth daily at bedtime    Major depressive disorder with single episode, in partial remission ([facility])  -     perphenazine 2 mg tablet;  Take 1 tablet (2 mg total) by mouth 2 (two) times a day      Current Outpatient Medications   Medication Sig Dispense Refill    cyanocobalamin 1,000 mcg/mL Inject 1 mL (1,000 mcg total) into a muscle every 30 (thirty) days 1 mL 0    divalproex sodium (DEPAKOTE ER) 250 mg 24 hr tablet TAKE ONE TABLET BY MOUTH AT NIGHT      ergocalciferol (VITAMIN D2) 50,000 units Take 1 capsule (50,000 Units total) by mouth once a week for 6 doses 5 capsule 0    levothyroxine 50 mcg tablet Take 1 tablet (50 mcg total) by mouth daily in the early morning 14 tablet 0    losartan (COZAAR) 25 mg tablet Take 1 tablet (25 mg total) by mouth daily 14 tablet 0    meclizine (ANTIVERT) 12 5 MG tablet Take 1 tablet (12 5 mg total) by mouth every 8 (eight) hours as needed for dizziness 42 tablet 0    memantine (NAMENDA) 5 mg tablet Take 1 tablet (5 mg total) by mouth 2 (two) times a day 60 tablet 3    mirtazapine (REMERON) 7 5 MG tablet Take 1 tablet (7 5 mg total) by mouth daily at bedtime 90 tablet 0    nystatin (MYCOSTATIN) powder Apply topically 2 (two) times a day for 30 days 15 g 1    pantoprazole (PROTONIX) 40 mg tablet Take 1 tablet (40 mg total) by mouth daily in the early morning 14 tablet 0    perphenazine 2 mg tablet Take 1 tablet (2 mg total) by mouth 2 (two) times a day 180 tablet 0    QUEtiapine (SEROquel) 25 mg tablet Take 1 tablet (25 mg total) by mouth daily at bedtime 90 tablet 0    rivaroxaban (XARELTO) 15 mg tablet Take 1 tablet (15 mg total) by mouth every evening 14 tablet 0    simvastatin (ZOCOR) 20 mg tablet Take 1 tablet (20 mg total) by mouth daily after dinner 14 tablet 0     No current facility-administered medications for this visit  Plan:          Remains stable  Previous trials of decreasing antipsychotics unsuccessful  Benefits of current med regimen outweigh risks at this time  Cont perphenazine 2 mg bid, seroquel 25 mg q bedtime, remeron 7 5 mg q bedtime, namenda 5 mg bid  Reviewed risks, benefits, side effects of medications, including no medication  Patient understands and agrees to treatment plan  F/u Dedrick 3 mths, sooner prn       Patient has been informed of 24 hours and weekend coverage for urgent situations accessed by calling the main clinic phone number       Mary Mcclain PA-C

## 2022-04-06 NOTE — BH TREATMENT PLAN
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)      Strengths/Personal Resources for Self-Care: supportive family, taking medications as prescribed, good physical health, sense of humor, special hobby/interest   Area/Areas of need (in own words): depression  1  Long Term Goal: maintain stability of depression   Target Date:3 months - 7/6/2022  Person/Persons responsible for completion of goal: Rere  2  Short Term Objective (s) - How will we reach this goal?:   A  Provider new recommended medication/dosage changes and/or continue medication(s): continue current medications as prescribed  B  Attend medication management appointments regularly  C  Take psychiatric medications responsibly  Target Date:3 months - 7/6/2022  Person/Persons Responsible for Completion of Goal: Rere/Dedrick/MD/Rere's daughter  Progress Towards Goals: continuing treatment  Treatment Modality: medication management every 3 months  Review due 180 days from date of this plan: 4 months - 8/6/2022  Expected length of service: maintenance  My Physician/PA/NP and I have developed this plan together and I agree to work on the goals and objectives  I understand the treatment goals that were developed for my treatment    Treatment Plan done but not signed at time of office visit due to:  Plan reviewed by phone or in person  and verbal consent given due to Gil social elen

## 2022-07-27 ENCOUNTER — OFFICE VISIT (OUTPATIENT)
Dept: PSYCHIATRY | Facility: CLINIC | Age: 87
End: 2022-07-27
Payer: MEDICARE

## 2022-07-27 DIAGNOSIS — F06.32 DEPRESSIVE DISORDER DUE TO ANOTHER MEDICAL CONDITION WITH MAJOR DEPRESSIVE-LIKE EPISODE: ICD-10-CM

## 2022-07-27 DIAGNOSIS — F03.90 DEMENTIA WITHOUT BEHAVIORAL DISTURBANCE, UNSPECIFIED DEMENTIA TYPE: Primary | Chronic | ICD-10-CM

## 2022-07-27 DIAGNOSIS — F32.4 MAJOR DEPRESSIVE DISORDER WITH SINGLE EPISODE, IN PARTIAL REMISSION (HCC): ICD-10-CM

## 2022-07-27 PROCEDURE — 99213 OFFICE O/P EST LOW 20 MIN: CPT | Performed by: PHYSICIAN ASSISTANT

## 2022-07-27 RX ORDER — MEMANTINE HYDROCHLORIDE 5 MG/1
5 TABLET ORAL 2 TIMES DAILY
Qty: 60 TABLET | Refills: 3 | Status: SHIPPED | OUTPATIENT
Start: 2022-07-27 | End: 2022-08-02

## 2022-07-27 RX ORDER — MEMANTINE HYDROCHLORIDE 5 MG/1
5 TABLET ORAL 2 TIMES DAILY
Qty: 60 TABLET | Refills: 3 | Status: SHIPPED | OUTPATIENT
Start: 2022-07-27 | End: 2022-07-27 | Stop reason: SDUPTHER

## 2022-07-27 RX ORDER — QUETIAPINE FUMARATE 25 MG/1
25 TABLET, FILM COATED ORAL
Qty: 90 TABLET | Refills: 0 | Status: SHIPPED | OUTPATIENT
Start: 2022-07-27 | End: 2022-07-27 | Stop reason: SDUPTHER

## 2022-07-27 RX ORDER — MIRTAZAPINE 7.5 MG/1
7.5 TABLET, FILM COATED ORAL
Qty: 90 TABLET | Refills: 0 | Status: SHIPPED | OUTPATIENT
Start: 2022-07-27 | End: 2022-10-24 | Stop reason: SDUPTHER

## 2022-07-27 RX ORDER — QUETIAPINE FUMARATE 25 MG/1
25 TABLET, FILM COATED ORAL
Qty: 90 TABLET | Refills: 0 | Status: SHIPPED | OUTPATIENT
Start: 2022-07-27 | End: 2022-09-26

## 2022-07-27 RX ORDER — MIRTAZAPINE 7.5 MG/1
7.5 TABLET, FILM COATED ORAL
Qty: 90 TABLET | Refills: 0 | Status: SHIPPED | OUTPATIENT
Start: 2022-07-27 | End: 2022-07-27 | Stop reason: SDUPTHER

## 2022-07-27 RX ORDER — PERPHENAZINE 2 MG/1
2 TABLET, FILM COATED ORAL 2 TIMES DAILY
Qty: 180 TABLET | Refills: 0 | Status: SHIPPED | OUTPATIENT
Start: 2022-07-27 | End: 2022-07-29

## 2022-07-27 NOTE — PSYCH
MEDICATION MANAGEMENT NOTE        [address]  [facility] [place] [address] [phone]        Name and Date of Birth:  Uziel Domingo 80 y o  8/18/1927    Date of Visit: July 31, 2022/seen with Abram Hsu with pt's permission    SUBJECTIVE:     Kermit Lunsford seen with her daughter and caretaker  Last seen by Dedrick 4/5/22 at which time meds unchanged  Was seen in [facility]- ED 5/7/22 for unwitnessed fall resulting in minor injuries and negative work-up  Was seen by PCP 5/17; cardiology 6/17 and cardiothoracic surgery 7/18 for aortic stenosis -aortic valve replacement is recommended  Kermit Lunsford contracted covid about 2 mths ago and was unable to go to her school reunion  This was a disappointment but she plans to go to the next one  Kermit Lunsford and her daughter have no concerns or complaints  Mood is good; no perceptual disturbances or problems with sleep, appetite, agitation  Dedrick brought up the possibility of med reduction  Kermit Lunsford and her daughter are very opposed to this as her admission to [facility] in 2020 was quite distressing and they do not wish this to happen again  Review of Systems   Constitutional: Negative for activity change and appetite change  Psychiatric/Behavioral: Negative for sleep disturbance  Psychiatric History     This office since 8/19/20  [facility] 7/19/20-8/5/20  No hx of suicide attempts  Trauma History      None reported  Social History         4 children  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            OBJECTIVE:     MENTAL STATUS EXAM  Appearance:  age appropriate   Behavior:  Pleasant & cooperative   Speech:  Normal volume, regular rate and rhythm   Mood:  euthymic   Affect:  mood congruent   Language: intact and appropriate for age, education, and intellect   Thought Process:  logical Associations: intact associations   Thought Content:  no overt delusions   Perceptual Disturbances: no auditory or visual hallcunations, does not appear responding to internal stimuli   Risk Potential / Abnormal Thoughts: Suicidal ideation - None  Homicidal ideation - None  Potential for aggression - No       Consciousness:  Alert & Awake   Sensorium:  Oriented to person   Attention: attention span and concentration appear shorter than expected for age       Fund of Knowledge:  Memory: past history: limited  recent memory impaired   Insight:  fair   Judgment: fair   Muscle Strength Muscle Tone: Grossly normal  normal   Gait/Station: normal gait/station with good balance   Motor Activity: no abnormal movements       Lab Review: I have reviewed all pertinent labs  7/7/22: BUN 28, creat 1 33, GFR 37  5/7/22: H&H WNL; creat 1 09, GFR 47    ASSESSMENT & PLAN          Diagnoses and all orders for this visit:    Dementia without behavioral disturbance, unspecified dementia type (HCC)    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  -     Discontinue: memantine (NAMENDA) 5 mg tablet; Take 1 tablet (5 mg total) by mouth 2 (two) times a day  -     Discontinue: mirtazapine (REMERON) 7 5 MG tablet; Take 1 tablet (7 5 mg total) by mouth daily at bedtime  -     Discontinue: QUEtiapine (SEROquel) 25 mg tablet; Take 1 tablet (25 mg total) by mouth daily at bedtime  -     QUEtiapine (SEROquel) 25 mg tablet; Take 1 tablet (25 mg total) by mouth daily at bedtime    Major depressive disorder with single episode, in partial remission (HCC)  -     Discontinue: perphenazine 2 mg tablet;  Take 1 tablet (2 mg total) by mouth 2 (two) times a day      Current Outpatient Medications   Medication Sig Dispense Refill    QUEtiapine (SEROquel) 25 mg tablet Take 1 tablet (25 mg total) by mouth daily at bedtime 90 tablet 0    cyanocobalamin 1,000 mcg/mL Inject 1 mL (1,000 mcg total) into a muscle every 30 (thirty) days 1 mL 0    divalproex sodium (DEPAKOTE ER) 250 mg 24 hr tablet TAKE ONE TABLET BY MOUTH AT NIGHT      ergocalciferol (VITAMIN D2) 50,000 units Take 1 capsule (50,000 Units total) by mouth once a week for 6 doses 5 capsule 0    levothyroxine 50 mcg tablet Take 1 tablet (50 mcg total) by mouth daily in the early morning 14 tablet 0    losartan (COZAAR) 25 mg tablet Take 1 tablet (25 mg total) by mouth daily 14 tablet 0    meclizine (ANTIVERT) 12 5 MG tablet Take 1 tablet (12 5 mg total) by mouth every 8 (eight) hours as needed for dizziness 42 tablet 0    memantine (NAMENDA) 5 mg tablet Take 1 tablet (5 mg total) by mouth 2 (two) times a day 60 tablet 3    mirtazapine (REMERON) 7 5 MG tablet Take 1 tablet (7 5 mg total) by mouth daily at bedtime 90 tablet 0    nystatin (MYCOSTATIN) powder Apply topically 2 (two) times a day for 30 days 15 g 1    pantoprazole (PROTONIX) 40 mg tablet Take 1 tablet (40 mg total) by mouth daily in the early morning 14 tablet 0    perphenazine 2 mg tablet TAKE ONE TABLET BY MOUTH TWICE A  tablet 0    rivaroxaban (XARELTO) 15 mg tablet Take 1 tablet (15 mg total) by mouth every evening 14 tablet 0    simvastatin (ZOCOR) 20 mg tablet Take 1 tablet (20 mg total) by mouth daily after dinner 14 tablet 0     No current facility-administered medications for this visit  Plan:          Keara Fulton is stable  Discussed med reduction but pt and daughter prefer not to do this at this time- see above  Will cont seroquel 25 mg q bedtime, namenda 5 mg bid, remeron 7 5 mg q bedtime, perphenazine 2 mg bid  Reviewed risks, benefits, side effects of medications, including no medication  Patient understands and agrees to treatment plan  Cont f/u with OP medical providers        F/u Dedrick 3 mths, sooner prn      Patient has been informed of 24 hours and weekend coverage for urgent situations accessed by calling the main clinic phone number  Bella Ballesteros PA-C

## 2022-07-28 DIAGNOSIS — F32.4 MAJOR DEPRESSIVE DISORDER WITH SINGLE EPISODE, IN PARTIAL REMISSION (HCC): ICD-10-CM

## 2022-07-29 DIAGNOSIS — F32.4 MAJOR DEPRESSIVE DISORDER WITH SINGLE EPISODE, IN PARTIAL REMISSION (HCC): ICD-10-CM

## 2022-07-29 RX ORDER — PERPHENAZINE 2 MG/1
TABLET, FILM COATED ORAL
Qty: 180 TABLET | Refills: 0 | Status: SHIPPED | OUTPATIENT
Start: 2022-07-29 | End: 2022-07-29 | Stop reason: SDUPTHER

## 2022-08-02 DIAGNOSIS — F06.32 DEPRESSIVE DISORDER DUE TO ANOTHER MEDICAL CONDITION WITH MAJOR DEPRESSIVE-LIKE EPISODE: ICD-10-CM

## 2022-08-02 RX ORDER — MEMANTINE HYDROCHLORIDE 5 MG/1
TABLET ORAL
Qty: 60 TABLET | Refills: 3 | Status: SHIPPED | OUTPATIENT
Start: 2022-08-02 | End: 2022-10-24 | Stop reason: SDUPTHER

## 2022-09-23 RX ORDER — PERPHENAZINE 2 MG/1
2 TABLET, FILM COATED ORAL 2 TIMES DAILY
Qty: 180 TABLET | Refills: 0 | Status: SHIPPED | OUTPATIENT
Start: 2022-09-23

## 2022-09-26 DIAGNOSIS — F06.32 DEPRESSIVE DISORDER DUE TO ANOTHER MEDICAL CONDITION WITH MAJOR DEPRESSIVE-LIKE EPISODE: ICD-10-CM

## 2022-09-26 RX ORDER — QUETIAPINE FUMARATE 25 MG/1
TABLET, FILM COATED ORAL
Qty: 90 TABLET | Refills: 0 | Status: SHIPPED | OUTPATIENT
Start: 2022-09-26

## 2022-10-24 ENCOUNTER — OFFICE VISIT (OUTPATIENT)
Dept: PSYCHIATRY | Facility: CLINIC | Age: 87
End: 2022-10-24

## 2022-10-24 DIAGNOSIS — F03.90 DEMENTIA WITHOUT BEHAVIORAL DISTURBANCE, PSYCHOTIC DISTURBANCE, MOOD DISTURBANCE, OR ANXIETY, UNSPECIFIED DEMENTIA SEVERITY, UNSPECIFIED DEMENTIA TYPE (HCC): Chronic | ICD-10-CM

## 2022-10-24 DIAGNOSIS — F06.32 DEPRESSIVE DISORDER DUE TO ANOTHER MEDICAL CONDITION WITH MAJOR DEPRESSIVE-LIKE EPISODE: Primary | ICD-10-CM

## 2022-10-24 RX ORDER — APIXABAN 2.5 MG/1
2.5 TABLET, FILM COATED ORAL 2 TIMES DAILY
COMMUNITY
Start: 2022-08-23 | End: 2022-10-24 | Stop reason: CLARIF

## 2022-10-24 RX ORDER — LISINOPRIL 5 MG/1
TABLET ORAL DAILY
COMMUNITY

## 2022-10-24 RX ORDER — MEMANTINE HYDROCHLORIDE 5 MG/1
5 TABLET ORAL 2 TIMES DAILY
Qty: 60 TABLET | Refills: 3 | Status: SHIPPED | OUTPATIENT
Start: 2022-10-24

## 2022-10-24 RX ORDER — LEVOTHYROXINE SODIUM 0.05 MG/1
TABLET ORAL
COMMUNITY

## 2022-10-24 RX ORDER — MIRTAZAPINE 7.5 MG/1
7.5 TABLET, FILM COATED ORAL
Qty: 90 TABLET | Refills: 0 | Status: SHIPPED | OUTPATIENT
Start: 2022-10-24

## 2022-10-24 RX ORDER — FUROSEMIDE 20 MG/1
TABLET ORAL
COMMUNITY
Start: 2022-09-26

## 2022-10-24 NOTE — BH TREATMENT PLAN
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    2  Dementia without behavioral disturbance, psychotic disturbance, mood disturbance, or anxiety, unspecified dementia severity, unspecified dementia type [facility])      Strengths/Personal Resources for Self-Care: supportive family, taking medications as prescribed, sense of humor, special hobby/interest   Area/Areas of need (in own words): depression  1  Long Term Goal: maintain stability of depression  Target Date:3 months - 1/24/2023  Person/Persons responsible for completion of goal: Rere  2  Short Term Objective (s) - How will we reach this goal?:   A  Provider new recommended medication/dosage changes and/or continue medication(s): continue current medications as prescribed  B  Attend medication management appointments regularly  C  Take psychiatric medications responsibly  Target Date:3 months - 1/24/2023  Person/Persons Responsible for Completion of Goal: Rere/daughter Eve/KENNY/MD  Progress Towards Goals: continuing treatment  Treatment Modality: medication management every 3 months  Review due 180 days from date of this plan: 4 months - 2/24/2023  Expected length of service: ongoing treatment  My Physician/PA/NP and I have developed this plan together and I agree to work on the goals and objectives  I understand the treatment goals that were developed for my treatment    Treatment Plan done but not signed at time of office visit due to:  Plan reviewed by phone or in person  and verbal consent given due to Gil social elen

## 2022-10-24 NOTE — PSYCH
MEDICATION MANAGEMENT NOTE        [address]  [facility] [address] [address] [phone]        Name and Date of Birth:  Yudelka Mondragon  8/18/1927    Date of Visit: October 24, 2022    SUBJECTIVE:    Taylor Ring seen with her daughter  Taylor Ring is pleasant and has no complaints or concerns  Doesn't recognize Pa-C initially today  Prisca León states that Taylor Ring has been getting up more frequently at night thinking its time to get up  This has been since the seasons have changed  Does not display much interest in things at home like tv but enjoys going out to do things  Enjoyed recent family reunion  There have been no falls or agitation  Review of Systems   Constitutional: Negative for activity change and appetite change  Cardiovascular: Positive for leg swelling  Daughter states that cardiology has determined Taylor Ring not a good candidate for surgery  Gastrointestinal: Negative  Psychiatric/Behavioral: Positive for sleep disturbance  Psychiatric History     This office since 8/19/20  [facility]  No hx of suicide attempts  Trauma History      None reported  Social History         4 children  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  Lives with her daughter               OBJECTIVE:     MENTAL STATUS EXAM  Appearance:  age appropriate, dressed casually   Behavior:  Pleasant & cooperative, poor eye contact   Speech:  paucity of speech   Mood:  euthymic   Affect:  mood congruent   Language: intact and appropriate for age, education, and intellect   Thought Process:  concrete   Associations: concrete associations   Thought Content:  no overt delusions   Perceptual Disturbances: does not appear responding to internal stimuli   Risk Potential / Abnormal Thoughts: Suicidal ideation - None  Homicidal ideation - None  Potential for aggression - No       Consciousness:  Alert & Awake   Sensorium:  oriented to person   Attention: attention span and concentration appear shorter than expected for age       Fund of Knowledge:  Memory: past history: yes  recent memory impaired   Insight:  limited   Judgment: limited   Muscle Strength Muscle Tone: Grossly normal  normal   Gait/Station: slow   Motor Activity: no abnormal movements       Lab Review: I have reviewed all pertinent labs  7/7/22: BUN 28, creat 1 33, GFR 37  5/7/22: H&H 12 5/38 3        ASSESSMENT & PLAN          Diagnoses and all orders for this visit:    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  -     mirtazapine (REMERON) 7 5 MG tablet; Take 1 tablet (7 5 mg total) by mouth daily at bedtime  -     memantine (NAMENDA) 5 mg tablet; Take 1 tablet (5 mg total) by mouth 2 (two) times a day    Dementia without behavioral disturbance, psychotic disturbance, mood disturbance, or anxiety, unspecified dementia severity, unspecified dementia type ([facility] )    Other orders  -     Discontinue: Eliquis 2 5 MG; Take 2 5 mg by mouth 2 (two) times a day  -     furosemide (LASIX) 20 mg tablet; TAKE ONE TABLET BY MOUTH EVERY DAY AS NEEDED (LOWER EXTREMITY EDEMA)  -     levothyroxine (Synthroid) 50 mcg tablet; Take by mouth  -     lisinopril (ZESTRIL) 5 mg tablet;  Take by mouth daily        Current Outpatient Medications   Medication Sig Dispense Refill   • memantine (NAMENDA) 5 mg tablet Take 1 tablet (5 mg total) by mouth 2 (two) times a day 60 tablet 3   • mirtazapine (REMERON) 7 5 MG tablet Take 1 tablet (7 5 mg total) by mouth daily at bedtime 90 tablet 0   • cyanocobalamin 1,000 mcg/mL Inject 1 mL (1,000 mcg total) into a muscle every 30 (thirty) days 1 mL 0   • divalproex sodium (DEPAKOTE ER) 250 mg 24 hr tablet TAKE ONE TABLET BY MOUTH AT NIGHT     • ergocalciferol (VITAMIN D2) 50,000 units Take 1 capsule (50,000 Units total) by mouth once a week for 6 doses 5 capsule 0   • furosemide (LASIX) 20 mg tablet TAKE ONE TABLET BY MOUTH EVERY DAY AS NEEDED (LOWER EXTREMITY EDEMA)     • levothyroxine (Synthroid) 50 mcg tablet Take by mouth     • levothyroxine 50 mcg tablet Take 1 tablet (50 mcg total) by mouth daily in the early morning 14 tablet 0   • lisinopril (ZESTRIL) 5 mg tablet Take by mouth daily     • losartan (COZAAR) 25 mg tablet Take 1 tablet (25 mg total) by mouth daily 14 tablet 0   • meclizine (ANTIVERT) 12 5 MG tablet Take 1 tablet (12 5 mg total) by mouth every 8 (eight) hours as needed for dizziness 42 tablet 0   • nystatin (MYCOSTATIN) powder Apply topically 2 (two) times a day for 30 days 15 g 1   • pantoprazole (PROTONIX) 40 mg tablet Take 1 tablet (40 mg total) by mouth daily in the early morning 14 tablet 0   • perphenazine 2 mg tablet Take 1 tablet (2 mg total) by mouth 2 (two) times a day 180 tablet 0   • QUEtiapine (SEROquel) 25 mg tablet TAKE ONE TABLET BY MOUTH AT BEDTIME 90 tablet 0   • rivaroxaban (XARELTO) 15 mg tablet Take 1 tablet (15 mg total) by mouth every evening 14 tablet 0   • simvastatin (ZOCOR) 20 mg tablet Take 1 tablet (20 mg total) by mouth daily after dinner 14 tablet 0     No current facility-administered medications for this visit  Plan:           Stable and unchanged from last visit  Ok to move pm perphenazine to bedtime  If not effective, ok to  Move am perphenazine to bedtime- total dose unchanged (4 mg/d)  Cont seroquel 25 mg q bedtime, namenda 5 mg bid, remeron 7 5 mg q bedtime  Reviewed risks, benefits, side effects of medications, including no medication  Patient understands and agrees to treatment plan  F/u Dedrick 3 mths, sooner prn     Patient has been informed of 24 hours and weekend coverage for urgent situations accessed by calling the main clinic phone number       Nadine Rhodes PA-C   Visit Time    Visit Start Time: 1104  Visit Stop Time: 1119  Total Visit Duration: 15 minutes

## 2023-01-24 ENCOUNTER — OFFICE VISIT (OUTPATIENT)
Dept: PSYCHIATRY | Facility: CLINIC | Age: 88
End: 2023-01-24

## 2023-01-24 VITALS
HEART RATE: 89 BPM | BODY MASS INDEX: 24.64 KG/M2 | DIASTOLIC BLOOD PRESSURE: 72 MMHG | SYSTOLIC BLOOD PRESSURE: 115 MMHG | WEIGHT: 122 LBS

## 2023-01-24 DIAGNOSIS — F06.32 DEPRESSIVE DISORDER DUE TO ANOTHER MEDICAL CONDITION WITH MAJOR DEPRESSIVE-LIKE EPISODE: ICD-10-CM

## 2023-01-24 DIAGNOSIS — F03.90 DEMENTIA WITHOUT BEHAVIORAL DISTURBANCE, PSYCHOTIC DISTURBANCE, MOOD DISTURBANCE, OR ANXIETY, UNSPECIFIED DEMENTIA SEVERITY, UNSPECIFIED DEMENTIA TYPE (HCC): Primary | Chronic | ICD-10-CM

## 2023-01-24 DIAGNOSIS — F32.4 MAJOR DEPRESSIVE DISORDER WITH SINGLE EPISODE, IN PARTIAL REMISSION (HCC): ICD-10-CM

## 2023-01-24 RX ORDER — PERPHENAZINE 2 MG/1
2 TABLET ORAL 2 TIMES DAILY
Qty: 180 TABLET | Refills: 0 | Status: SHIPPED | OUTPATIENT
Start: 2023-01-24

## 2023-01-24 RX ORDER — MEMANTINE HYDROCHLORIDE 5 MG/1
5 TABLET ORAL 2 TIMES DAILY
Qty: 180 TABLET | Refills: 0 | Status: SHIPPED | OUTPATIENT
Start: 2023-01-24

## 2023-01-24 RX ORDER — MIRTAZAPINE 7.5 MG/1
7.5 TABLET, FILM COATED ORAL
Qty: 90 TABLET | Refills: 0 | Status: SHIPPED | OUTPATIENT
Start: 2023-01-24

## 2023-01-24 NOTE — PSYCH
MEDICATION MANAGEMENT NOTE        [address] [facility] [address] [address] [phone]        Name and Date of Birth:  Violet Shine  8/18/1927    Date of Visit: Jan 24, 2023  SUBJECTIVE:       Christian Paige seen with her daughter who is primary caretaker  Last seen by Dedrick 10/24 at which time pm perphenazine moved to bedtime  Christian Paige is sleeping better at night and not waking up confused as much  Christian Paige denies sadness or nervousness  Appetite is good overall  Uses wheelchair when she is in store or has to walk a good distance  Also has walker at home if needed  She has not had any falls  Kaitlyn Ribera notes no change from last visit other than improvement in sleep  Had 2 episodes when Rere did not recognize Kaitlyn Ribera  Sometimes is not oriented to place when she is at home and sometimes thinks there is someone in the home or recently in the home who has not been  Review of Systems   Constitutional: Negative for activity change, appetite change and unexpected weight change  Daughter monitors weight for retained fluid   HENT: Positive for hearing loss  Musculoskeletal: Positive for gait problem  Psychiatric/Behavioral: Negative for sleep disturbance  Psychiatric History     This office since 8/19/20  [facility] 7/19/20-8/5/20  No hx of suicide attempts  Trauma History      None reported  Social History         4 children  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  Lives with her daughter Kaitlyn Ribera             OBJECTIVE:    115/72, 89, 122 lbs    MENTAL STATUS EXAM  Appearance:  age appropriate, good grooming    Behavior:  Pleasant & cooperative, sense of humor   Speech:  paucity of speech, dementia and hearing loss   Mood:  euthymic   Affect:  constricted   Language: not tested today   Thought Process:  logical Associations: concrete associations   Thought Content:  no overt delusions   Perceptual Disturbances: does not appear responding to internal stimuli   Risk Potential / Abnormal Thoughts: Suicidal ideation - None  Homicidal ideation - None  Potential for aggression - No       Consciousness:  Alert & Awake   Sensorium:  Oriented to person   Attention: attention span and concentration appear shorter than expected for age       Fund of Knowledge:  Memory: awareness of current events: no  recent memory impaired   Insight:  limited   Judgment: limited   Muscle Strength Muscle Tone: Grossly normal  normal   Gait/Station: slow and shuffling   Motor Activity: shuffling and mild bradykinesia       Lab Review: I have reviewed all pertinent labs  11/14/22: BUN 27, GFR 38, creat 1,29, Na 139  10/11/22: H&H 12 8/38 6  9/9/22: lipids- WNL; TSH 2 22      ASSESSMENT & PLAN          Diagnoses and all orders for this visit:    Dementia without behavioral disturbance, psychotic disturbance, mood disturbance, or anxiety, unspecified dementia severity, unspecified dementia type (HCC)    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  -     memantine (NAMENDA) 5 mg tablet; Take 1 tablet (5 mg total) by mouth 2 (two) times a day  -     mirtazapine (REMERON) 7 5 MG tablet; Take 1 tablet (7 5 mg total) by mouth daily at bedtime    Major depressive disorder with single episode, in partial remission (HCC)  -     perphenazine 2 mg tablet;  Take 1 tablet (2 mg total) by mouth 2 (two) times a day      Current Outpatient Medications   Medication Sig Dispense Refill   • memantine (NAMENDA) 5 mg tablet Take 1 tablet (5 mg total) by mouth 2 (two) times a day 180 tablet 0   • mirtazapine (REMERON) 7 5 MG tablet Take 1 tablet (7 5 mg total) by mouth daily at bedtime 90 tablet 0   • perphenazine 2 mg tablet Take 1 tablet (2 mg total) by mouth 2 (two) times a day 180 tablet 0   • cyanocobalamin 1,000 mcg/mL Inject 1 mL (1,000 mcg total) into a muscle every 30 (thirty) days 1 mL 0   • divalproex sodium (DEPAKOTE ER) 250 mg 24 hr tablet TAKE ONE TABLET BY MOUTH AT NIGHT     • ergocalciferol (VITAMIN D2) 50,000 units Take 1 capsule (50,000 Units total) by mouth once a week for 6 doses 5 capsule 0   • furosemide (LASIX) 20 mg tablet TAKE ONE TABLET BY MOUTH EVERY DAY AS NEEDED (LOWER EXTREMITY EDEMA)     • levothyroxine (Synthroid) 50 mcg tablet Take by mouth     • levothyroxine 50 mcg tablet Take 1 tablet (50 mcg total) by mouth daily in the early morning 14 tablet 0   • lisinopril (ZESTRIL) 5 mg tablet Take by mouth daily     • losartan (COZAAR) 25 mg tablet Take 1 tablet (25 mg total) by mouth daily 14 tablet 0   • meclizine (ANTIVERT) 12 5 MG tablet Take 1 tablet (12 5 mg total) by mouth every 8 (eight) hours as needed for dizziness 42 tablet 0   • nystatin (MYCOSTATIN) powder Apply topically 2 (two) times a day for 30 days 15 g 1   • pantoprazole (PROTONIX) 40 mg tablet Take 1 tablet (40 mg total) by mouth daily in the early morning 14 tablet 0   • QUEtiapine (SEROquel) 25 mg tablet TAKE ONE TABLET BY MOUTH AT BEDTIME 90 tablet 0   • rivaroxaban (XARELTO) 15 mg tablet Take 1 tablet (15 mg total) by mouth every evening 14 tablet 0   • simvastatin (ZOCOR) 20 mg tablet Take 1 tablet (20 mg total) by mouth daily after dinner 14 tablet 0     No current facility-administered medications for this visit  Plan: Will decrease perphenazine -stop am dose and just give 2 mg q bedtime to see if this improves gait  Daughter prefers we live rx unchanged in case it needs to be increased and they will have enough  Cont seroquel 25 mg q bedtime, remeron 7 5 mg q bedtime, namenda 5 mg bid  Reviewed risks, benefits, side effects of medications, including no medication  Patient understands and agrees to treatment plan              F/u Pa-C 3 mths, sooner prn       Patient has been informed of 24 hours and weekend coverage for urgent situations accessed by calling the main clinic phone number       Angle Starr PA-C   Visit time: In: 2678                   Out: 1118                   Total: 25 min

## 2023-04-23 ENCOUNTER — TELEPHONE (OUTPATIENT)
Dept: OTHER | Facility: OTHER | Age: 88
End: 2023-04-23

## 2023-04-23 NOTE — TELEPHONE ENCOUNTER
Patient's daughter is calling regarding cancelling an appointment  Date/Time:4/24/23 @ 11am    Patient was rescheduled: YES [] NO [x]    Patient's daughter requesting call back to reschedule: YES [] NO [x]    There was no appt in pt's chart but the daughter says she received a confirmation call and wanted to cancel